# Patient Record
Sex: FEMALE | Race: OTHER | NOT HISPANIC OR LATINO
[De-identification: names, ages, dates, MRNs, and addresses within clinical notes are randomized per-mention and may not be internally consistent; named-entity substitution may affect disease eponyms.]

---

## 2017-09-05 ENCOUNTER — APPOINTMENT (OUTPATIENT)
Dept: GYNECOLOGIC ONCOLOGY | Facility: CLINIC | Age: 67
End: 2017-09-05
Payer: MEDICARE

## 2017-09-05 ENCOUNTER — RECORD ABSTRACTING (OUTPATIENT)
Age: 67
End: 2017-09-05

## 2017-09-05 VITALS
BODY MASS INDEX: 39.99 KG/M2 | HEART RATE: 88 BPM | SYSTOLIC BLOOD PRESSURE: 128 MMHG | HEIGHT: 65 IN | WEIGHT: 240 LBS | DIASTOLIC BLOOD PRESSURE: 74 MMHG

## 2017-09-05 DIAGNOSIS — Z98.890 OTHER SPECIFIED POSTPROCEDURAL STATES: ICD-10-CM

## 2017-09-05 DIAGNOSIS — Z86.69 PERSONAL HISTORY OF OTHER DISEASES OF THE NERVOUS SYSTEM AND SENSE ORGANS: ICD-10-CM

## 2017-09-05 DIAGNOSIS — Z96.641 PRESENCE OF RIGHT ARTIFICIAL HIP JOINT: ICD-10-CM

## 2017-09-05 PROCEDURE — 99205 OFFICE O/P NEW HI 60 MIN: CPT

## 2017-09-07 ENCOUNTER — OTHER (OUTPATIENT)
Age: 67
End: 2017-09-07

## 2017-09-08 ENCOUNTER — APPOINTMENT (OUTPATIENT)
Dept: MRI IMAGING | Facility: CLINIC | Age: 67
End: 2017-09-08

## 2017-09-10 ENCOUNTER — FORM ENCOUNTER (OUTPATIENT)
Age: 67
End: 2017-09-10

## 2017-09-11 ENCOUNTER — OUTPATIENT (OUTPATIENT)
Dept: OUTPATIENT SERVICES | Facility: HOSPITAL | Age: 67
LOS: 1 days | End: 2017-09-11

## 2017-09-11 ENCOUNTER — APPOINTMENT (OUTPATIENT)
Dept: MRI IMAGING | Facility: CLINIC | Age: 67
End: 2017-09-11
Payer: MEDICARE

## 2017-09-11 DIAGNOSIS — C54.1 MALIGNANT NEOPLASM OF ENDOMETRIUM: ICD-10-CM

## 2017-09-11 PROCEDURE — 72197 MRI PELVIS W/O & W/DYE: CPT | Mod: 26

## 2017-09-14 ENCOUNTER — RESULT REVIEW (OUTPATIENT)
Age: 67
End: 2017-09-14

## 2017-09-14 ENCOUNTER — OUTPATIENT (OUTPATIENT)
Dept: OUTPATIENT SERVICES | Facility: HOSPITAL | Age: 67
LOS: 1 days | End: 2017-09-14
Payer: COMMERCIAL

## 2017-09-14 DIAGNOSIS — C54.1 MALIGNANT NEOPLASM OF ENDOMETRIUM: ICD-10-CM

## 2017-09-14 LAB — CORE LAB BIOPSY: NORMAL

## 2017-09-14 PROCEDURE — 88321 CONSLTJ&REPRT SLD PREP ELSWR: CPT

## 2017-09-18 LAB — SURGICAL PATHOLOGY STUDY: SIGNIFICANT CHANGE UP

## 2017-09-27 ENCOUNTER — APPOINTMENT (OUTPATIENT)
Dept: GYNECOLOGIC ONCOLOGY | Facility: CLINIC | Age: 67
End: 2017-09-27
Payer: MEDICARE

## 2017-09-27 PROCEDURE — 99214 OFFICE O/P EST MOD 30 MIN: CPT

## 2017-10-04 ENCOUNTER — OUTPATIENT (OUTPATIENT)
Dept: OUTPATIENT SERVICES | Facility: HOSPITAL | Age: 67
LOS: 1 days | End: 2017-10-04
Payer: COMMERCIAL

## 2017-10-04 VITALS
RESPIRATION RATE: 16 BRPM | WEIGHT: 240.08 LBS | DIASTOLIC BLOOD PRESSURE: 64 MMHG | SYSTOLIC BLOOD PRESSURE: 132 MMHG | HEIGHT: 62 IN | HEART RATE: 87 BPM | TEMPERATURE: 97 F

## 2017-10-04 DIAGNOSIS — C54.1 MALIGNANT NEOPLASM OF ENDOMETRIUM: ICD-10-CM

## 2017-10-04 DIAGNOSIS — Z96.653 PRESENCE OF ARTIFICIAL KNEE JOINT, BILATERAL: Chronic | ICD-10-CM

## 2017-10-04 DIAGNOSIS — Z96.649 PRESENCE OF UNSPECIFIED ARTIFICIAL HIP JOINT: Chronic | ICD-10-CM

## 2017-10-04 DIAGNOSIS — R06.83 SNORING: ICD-10-CM

## 2017-10-04 DIAGNOSIS — Z98.42 CATARACT EXTRACTION STATUS, LEFT EYE: Chronic | ICD-10-CM

## 2017-10-04 DIAGNOSIS — R94.31 ABNORMAL ELECTROCARDIOGRAM [ECG] [EKG]: ICD-10-CM

## 2017-10-04 LAB
BLD GP AB SCN SERPL QL: NEGATIVE — SIGNIFICANT CHANGE UP
BUN SERPL-MCNC: 19 MG/DL — SIGNIFICANT CHANGE UP (ref 7–23)
CALCIUM SERPL-MCNC: 9.4 MG/DL — SIGNIFICANT CHANGE UP (ref 8.4–10.5)
CHLORIDE SERPL-SCNC: 102 MMOL/L — SIGNIFICANT CHANGE UP (ref 98–107)
CO2 SERPL-SCNC: 25 MMOL/L — SIGNIFICANT CHANGE UP (ref 22–31)
CREAT SERPL-MCNC: 0.92 MG/DL — SIGNIFICANT CHANGE UP (ref 0.5–1.3)
GLUCOSE SERPL-MCNC: 69 MG/DL — LOW (ref 70–99)
HCT VFR BLD CALC: 37.2 % — SIGNIFICANT CHANGE UP (ref 34.5–45)
HGB BLD-MCNC: 11.6 G/DL — SIGNIFICANT CHANGE UP (ref 11.5–15.5)
MCHC RBC-ENTMCNC: 26 PG — LOW (ref 27–34)
MCHC RBC-ENTMCNC: 31.2 % — LOW (ref 32–36)
MCV RBC AUTO: 83.2 FL — SIGNIFICANT CHANGE UP (ref 80–100)
NRBC # FLD: 0 — SIGNIFICANT CHANGE UP
PLATELET # BLD AUTO: 179 K/UL — SIGNIFICANT CHANGE UP (ref 150–400)
PMV BLD: 12 FL — SIGNIFICANT CHANGE UP (ref 7–13)
POTASSIUM SERPL-MCNC: 4.6 MMOL/L — SIGNIFICANT CHANGE UP (ref 3.5–5.3)
POTASSIUM SERPL-SCNC: 4.6 MMOL/L — SIGNIFICANT CHANGE UP (ref 3.5–5.3)
RBC # BLD: 4.47 M/UL — SIGNIFICANT CHANGE UP (ref 3.8–5.2)
RBC # FLD: 13.7 % — SIGNIFICANT CHANGE UP (ref 10.3–14.5)
RH IG SCN BLD-IMP: POSITIVE — SIGNIFICANT CHANGE UP
SODIUM SERPL-SCNC: 142 MMOL/L — SIGNIFICANT CHANGE UP (ref 135–145)
WBC # BLD: 5.99 K/UL — SIGNIFICANT CHANGE UP (ref 3.8–10.5)
WBC # FLD AUTO: 5.99 K/UL — SIGNIFICANT CHANGE UP (ref 3.8–10.5)

## 2017-10-04 PROCEDURE — 93010 ELECTROCARDIOGRAM REPORT: CPT

## 2017-10-04 RX ORDER — SODIUM CHLORIDE 9 MG/ML
1000 INJECTION, SOLUTION INTRAVENOUS
Qty: 0 | Refills: 0 | Status: DISCONTINUED | OUTPATIENT
Start: 2017-10-12 | End: 2017-10-13

## 2017-10-04 RX ORDER — SODIUM CHLORIDE 9 MG/ML
3 INJECTION INTRAMUSCULAR; INTRAVENOUS; SUBCUTANEOUS EVERY 8 HOURS
Qty: 0 | Refills: 0 | Status: DISCONTINUED | OUTPATIENT
Start: 2017-10-12 | End: 2017-10-13

## 2017-10-04 NOTE — H&P PST ADULT - PROBLEM SELECTOR PLAN 1
Scheduled for Robotic Total Laparoscopic Hysterectomy, Bilateral Salpingo Oophorectomy on 10/12/2017  Pre op instructions given, pt verbalized understanding  Chlorhexidine wash and GI prophylaxis provided

## 2017-10-04 NOTE — H&P PST ADULT - NEGATIVE NEUROLOGICAL SYMPTOMS
no paresthesias/no syncope/no loss of consciousness/no headache/no difficulty walking/no confusion/no weakness/no transient paralysis/no focal seizures/no facial palsy/no hemiparesis/no vertigo/no generalized seizures/no loss of sensation/no tremors

## 2017-10-04 NOTE — H&P PST ADULT - NEGATIVE CARDIOVASCULAR SYMPTOMS
no claudication/no paroxysmal nocturnal dyspnea/no palpitations/no chest pain/no orthopnea/no peripheral edema

## 2017-10-04 NOTE — H&P PST ADULT - PSH
H/O cataract extraction, left  2014  History of hip replacement  6/2013  History of total bilateral knee replacement  right in 2010, left in 2015

## 2017-10-04 NOTE — H&P PST ADULT - NSANTHOSAYNRD_GEN_A_CORE
No. CECILIA screening performed.  STOP BANG Legend: 0-2 = LOW Risk; 3-4 = INTERMEDIATE Risk; 5-8 = HIGH Risk

## 2017-10-04 NOTE — H&P PST ADULT - MALLAMPATI CLASS
Class I (easy) - visualization of the soft palate, fauces, uvula, and both anterior and posterior pillars Class IV (difficult) - the soft palate is not visible at all

## 2017-10-04 NOTE — H&P PST ADULT - HISTORY OF PRESENT ILLNESS
67 y/o female presents to [email or web address] for preoperative evaluation with diagnosis of malignant neoplasm of endometrium. h/o endometrial CA in 2015 and s/p radiation treatement and attempted hysterectomy. Pt's daughter states surgery was unsuccessful because mothers "uterus was attached to her colon". She followed by every 3 months but in January pt develop spotting and was later diagnosed with recurrence of endometrial CA. Scheduled for Robotic Total Laparoscopic Hysterectomy, Bilateral Salpingo Oophorectomy on 10/12/2017 67 y/o female presents to [facility] for preoperative evaluation with diagnosis of malignant neoplasm of endometrium. h/o endometrial CA in 2015 and s/p radiation treatement and attempted hysterectomy. Pt's daughter states surgery was unsuccessful because mothers "uterus was attached to her colon". She followed up every 3 months but in January of this year patient started spotting and was later diagnosed with recurrence of endometrial CA. Scheduled for Robotic Total Laparoscopic Hysterectomy, Bilateral Salpingo Oophorectomy on 10/12/2017

## 2017-10-04 NOTE — H&P PST ADULT - PROBLEM SELECTOR PLAN 2
Pt has appt for cardiac clearance on 10/6, copy of clearance requested   Comparison EKG, ECHO and Stress Test reports requested

## 2017-10-06 ENCOUNTER — TRANSCRIPTION ENCOUNTER (OUTPATIENT)
Age: 67
End: 2017-10-06

## 2017-10-09 ENCOUNTER — OTHER (OUTPATIENT)
Age: 67
End: 2017-10-09

## 2017-10-11 NOTE — ASU PATIENT PROFILE, ADULT - LANGUAGE ASSISTANCE NEEDED
No-Patient/Caregiver offered and refused free interpretation services. Pt Faroese speaking, as per translater Anir ID# [id number], okay for daughter to interpret/No-Patient/Caregiver offered and refused free interpretation services.

## 2017-10-12 ENCOUNTER — TRANSCRIPTION ENCOUNTER (OUTPATIENT)
Age: 67
End: 2017-10-12

## 2017-10-12 ENCOUNTER — INPATIENT (INPATIENT)
Facility: HOSPITAL | Age: 67
LOS: 0 days | Discharge: HOME | End: 2017-10-13
Attending: OBSTETRICS & GYNECOLOGY | Admitting: OBSTETRICS & GYNECOLOGY
Payer: MEDICARE

## 2017-10-12 ENCOUNTER — APPOINTMENT (OUTPATIENT)
Dept: GYNECOLOGIC ONCOLOGY | Facility: HOSPITAL | Age: 67
End: 2017-10-12

## 2017-10-12 ENCOUNTER — RESULT REVIEW (OUTPATIENT)
Age: 67
End: 2017-10-12

## 2017-10-12 VITALS
DIASTOLIC BLOOD PRESSURE: 84 MMHG | RESPIRATION RATE: 16 BRPM | SYSTOLIC BLOOD PRESSURE: 190 MMHG | HEART RATE: 84 BPM | WEIGHT: 240.08 LBS | OXYGEN SATURATION: 98 % | HEIGHT: 62 IN | TEMPERATURE: 98 F

## 2017-10-12 DIAGNOSIS — Z96.649 PRESENCE OF UNSPECIFIED ARTIFICIAL HIP JOINT: Chronic | ICD-10-CM

## 2017-10-12 DIAGNOSIS — C54.1 MALIGNANT NEOPLASM OF ENDOMETRIUM: ICD-10-CM

## 2017-10-12 DIAGNOSIS — Z98.42 CATARACT EXTRACTION STATUS, LEFT EYE: Chronic | ICD-10-CM

## 2017-10-12 DIAGNOSIS — Z96.653 PRESENCE OF ARTIFICIAL KNEE JOINT, BILATERAL: Chronic | ICD-10-CM

## 2017-10-12 LAB — RH IG SCN BLD-IMP: POSITIVE — SIGNIFICANT CHANGE UP

## 2017-10-12 PROCEDURE — 58573 TLH W/T/O UTERUS OVER 250 G: CPT

## 2017-10-12 PROCEDURE — S2900 ROBOTIC SURGICAL SYSTEM: CPT | Mod: NC

## 2017-10-12 PROCEDURE — 88309 TISSUE EXAM BY PATHOLOGIST: CPT | Mod: 26

## 2017-10-12 PROCEDURE — 88341 IMHCHEM/IMCYTCHM EA ADD ANTB: CPT | Mod: 26

## 2017-10-12 PROCEDURE — 88342 IMHCHEM/IMCYTCHM 1ST ANTB: CPT | Mod: 26

## 2017-10-12 PROCEDURE — 88112 CYTOPATH CELL ENHANCE TECH: CPT | Mod: 26

## 2017-10-12 RX ORDER — ONDANSETRON 8 MG/1
4 TABLET, FILM COATED ORAL ONCE
Qty: 0 | Refills: 0 | Status: DISCONTINUED | OUTPATIENT
Start: 2017-10-12 | End: 2017-10-13

## 2017-10-12 RX ORDER — IBUPROFEN 200 MG
600 TABLET ORAL EVERY 6 HOURS
Qty: 0 | Refills: 0 | Status: DISCONTINUED | OUTPATIENT
Start: 2017-10-12 | End: 2017-10-13

## 2017-10-12 RX ORDER — HYDROMORPHONE HYDROCHLORIDE 2 MG/ML
0.5 INJECTION INTRAMUSCULAR; INTRAVENOUS; SUBCUTANEOUS
Qty: 0 | Refills: 0 | Status: DISCONTINUED | OUTPATIENT
Start: 2017-10-12 | End: 2017-10-13

## 2017-10-12 RX ORDER — DOCUSATE SODIUM 100 MG
100 CAPSULE ORAL
Qty: 0 | Refills: 0 | Status: DISCONTINUED | OUTPATIENT
Start: 2017-10-12 | End: 2017-10-13

## 2017-10-12 RX ORDER — ENOXAPARIN SODIUM 100 MG/ML
40 INJECTION SUBCUTANEOUS DAILY
Qty: 0 | Refills: 0 | Status: DISCONTINUED | OUTPATIENT
Start: 2017-10-12 | End: 2017-10-13

## 2017-10-12 RX ORDER — ACETAMINOPHEN 500 MG
975 TABLET ORAL EVERY 6 HOURS
Qty: 0 | Refills: 0 | Status: DISCONTINUED | OUTPATIENT
Start: 2017-10-12 | End: 2017-10-13

## 2017-10-12 RX ORDER — OXYCODONE HYDROCHLORIDE 5 MG/1
5 TABLET ORAL EVERY 4 HOURS
Qty: 0 | Refills: 0 | Status: DISCONTINUED | OUTPATIENT
Start: 2017-10-12 | End: 2017-10-13

## 2017-10-12 RX ORDER — SODIUM CHLORIDE 9 MG/ML
1000 INJECTION, SOLUTION INTRAVENOUS
Qty: 0 | Refills: 0 | Status: DISCONTINUED | OUTPATIENT
Start: 2017-10-12 | End: 2017-10-13

## 2017-10-12 RX ORDER — SODIUM CHLORIDE 9 MG/ML
500 INJECTION, SOLUTION INTRAVENOUS ONCE
Qty: 0 | Refills: 0 | Status: COMPLETED | OUTPATIENT
Start: 2017-10-12 | End: 2017-10-12

## 2017-10-12 RX ORDER — SENNA PLUS 8.6 MG/1
2 TABLET ORAL AT BEDTIME
Qty: 0 | Refills: 0 | Status: DISCONTINUED | OUTPATIENT
Start: 2017-10-12 | End: 2017-10-13

## 2017-10-12 RX ORDER — HEPARIN SODIUM 5000 [USP'U]/ML
5000 INJECTION INTRAVENOUS; SUBCUTANEOUS ONCE
Qty: 0 | Refills: 0 | Status: COMPLETED | OUTPATIENT
Start: 2017-10-12 | End: 2017-10-12

## 2017-10-12 RX ADMIN — SODIUM CHLORIDE 3 MILLILITER(S): 9 INJECTION INTRAMUSCULAR; INTRAVENOUS; SUBCUTANEOUS at 22:00

## 2017-10-12 RX ADMIN — SODIUM CHLORIDE 3 MILLILITER(S): 9 INJECTION INTRAMUSCULAR; INTRAVENOUS; SUBCUTANEOUS at 18:25

## 2017-10-12 RX ADMIN — Medication 600 MILLIGRAM(S): at 21:05

## 2017-10-12 RX ADMIN — Medication 600 MILLIGRAM(S): at 21:40

## 2017-10-12 RX ADMIN — SODIUM CHLORIDE 1000 MILLILITER(S): 9 INJECTION, SOLUTION INTRAVENOUS at 19:30

## 2017-10-12 RX ADMIN — Medication 975 MILLIGRAM(S): at 21:44

## 2017-10-12 RX ADMIN — SENNA PLUS 2 TABLET(S): 8.6 TABLET ORAL at 21:44

## 2017-10-12 RX ADMIN — HEPARIN SODIUM 5000 UNIT(S): 5000 INJECTION INTRAVENOUS; SUBCUTANEOUS at 11:53

## 2017-10-12 RX ADMIN — SODIUM CHLORIDE 125 MILLILITER(S): 9 INJECTION, SOLUTION INTRAVENOUS at 18:15

## 2017-10-12 RX ADMIN — SODIUM CHLORIDE 30 MILLILITER(S): 9 INJECTION, SOLUTION INTRAVENOUS at 11:53

## 2017-10-12 NOTE — DISCHARGE NOTE ADULT - CARE PROVIDERS DIRECT ADDRESSES
[email or web address]

## 2017-10-12 NOTE — DISCHARGE NOTE ADULT - CARE PLAN
Principal Discharge DX:	Malignant neoplasm of endometrium  Goal:	return to baseline  Instructions for follow-up, activity and diet:	Please follow up in 2 weeks with Dr. London for your post-operative check. Please take Motrin, Tylenol for pain control. Please take Oxycodone for pain not relieved by Motrin, Tylenol. No heavy lifting, strenous exercise for 4-6 weeks. Nothing in the vagina - no sex, douching, tampons, tub baths - for 6 weeks.  Secondary Diagnosis:	Postoperative state

## 2017-10-12 NOTE — DISCHARGE NOTE ADULT - INSTRUCTIONS
none Call MD for fever greater than 101, nausea, vomiting, increased pain, pus drainage from incision, or go to ER.

## 2017-10-12 NOTE — DISCHARGE NOTE ADULT - CARE PROVIDER_API CALL
Pascale London), Gynecologic Oncology; Obstetrics and Gynecology  [address] [place] [address]  Phone: [phone]  Fax: [phone]

## 2017-10-12 NOTE — PROGRESS NOTE ADULT - ASSESSMENT
65 Y/O S/P Robotic, TLH, BSO  Plan  1. Patient encouraged to use Incentive Spirometer  2. Pain management; Tylenol, Motrin ATC, Oxycodone PRN  3. Clear liquids diet  4. Stokes to gravity  5. IV Fluids LR @125mL/hr  6. Lovenox 40 mg subcut daily  7. CBC, BMP, Mg & Phos in AM POD#1 65 Y/O S/P Robotic, TLH, BSO  Plan  1. Patient encouraged to use Incentive Spirometer  2. Pain management; Tylenol, Motrin ATC, Oxycodone PRN  3. Regular diet  4. Stokes to gravity  5. IV Fluids LR @125mL/hr  6. Lovenox 40 mg subcut daily  7. CBC, BMP, Mg & Phos in AM POD#1

## 2017-10-12 NOTE — DISCHARGE NOTE ADULT - NSTOBACCOHOTLINE_GEN_A_NCS
[facility] Smokers Quitline ([id number]) [facility] Smokers Quitline ([id number])

## 2017-10-12 NOTE — DISCHARGE NOTE ADULT - PATIENT PORTAL LINK FT
“You can access the Follow[employer] Patient Portal, offered by [facility], by registering with the following website: [email or web address]”

## 2017-10-12 NOTE — DISCHARGE NOTE ADULT - HOSPITAL COURSE
67 y/o s/p robotic-assisted total abdominal hysterectomy, bilateral salpingectomy, lysis of adhesions. Please see operative note for details. Pt had an uncomplicated post-operative course. POD#1, pt was able to tolerate regular diet, her wheat was discontinued and she was able to void spontaneously. Pt was discharged on POD#    with pain well controlled, after meeting all post-operative milestones.     Hct trended: 37.2->  WBC trended: 5.99-> 65 y/o s/p robotic-assisted total abdominal hysterectomy, bilateral salpingectomy, lysis of adhesions. Please see operative note for details. Pt had an uncomplicated post-operative course. POD#1, pt was able to tolerate regular diet, her wheat was discontinued and she was able to void spontaneously. Pt was discharged on POD#1 with pain well controlled, after meeting all post-operative milestones.     Hct trended: 37.2->30.3  WBC trended: 5.99->8.73

## 2017-10-12 NOTE — DISCHARGE NOTE ADULT - MEDICATION SUMMARY - MEDICATIONS TO TAKE
I will START or STAY ON the medications listed below when I get home from the hospital:    oxyCODONE 5 mg oral tablet  -- 1 tab(s) by mouth every 6 hours, As Needed - for severe pain MDD:4  -- Caution federal law prohibits the transfer of this drug to any person other  than the person for whom it was prescribed.  It is very important that you take or use this exactly as directed.  Do not skip doses or discontinue unless directed by your doctor.  May cause drowsiness.  Alcohol may intensify this effect.  Use care when operating dangerous machinery.  This prescription cannot be refilled.  Using more of this medication than prescribed may cause serious breathing problems.    -- Indication: For Pain    anastrozole 1 mg oral tablet  -- 1 tab(s) by mouth once a day pm  -- Indication: For home med

## 2017-10-12 NOTE — DISCHARGE NOTE ADULT - PLAN OF CARE
return to baseline Please follow up in 2 weeks with Dr. London for your post-operative check. Please take Motrin, Tylenol for pain control. Please take Oxycodone for pain not relieved by Motrin, Tylenol. No heavy lifting, strenous exercise for 4-6 weeks. Nothing in the vagina - no sex, douching, tampons, tub baths - for 6 weeks.

## 2017-10-12 NOTE — BRIEF OPERATIVE NOTE - PROCEDURE
<<-----Click on this checkbox to enter Procedure Robotic assisted procedure  10/12/2017  PREM-BSO, CM  Active  [email or web address]

## 2017-10-13 VITALS
SYSTOLIC BLOOD PRESSURE: 116 MMHG | HEART RATE: 62 BPM | OXYGEN SATURATION: 97 % | TEMPERATURE: 98 F | RESPIRATION RATE: 18 BRPM | DIASTOLIC BLOOD PRESSURE: 45 MMHG

## 2017-10-13 DIAGNOSIS — Z98.890 OTHER SPECIFIED POSTPROCEDURAL STATES: ICD-10-CM

## 2017-10-13 LAB
BASOPHILS # BLD AUTO: 0.01 K/UL — SIGNIFICANT CHANGE UP (ref 0–0.2)
BASOPHILS NFR BLD AUTO: 0.1 % — SIGNIFICANT CHANGE UP (ref 0–2)
BUN SERPL-MCNC: 14 MG/DL — SIGNIFICANT CHANGE UP (ref 7–23)
CALCIUM SERPL-MCNC: 8.8 MG/DL — SIGNIFICANT CHANGE UP (ref 8.4–10.5)
CHLORIDE SERPL-SCNC: 102 MMOL/L — SIGNIFICANT CHANGE UP (ref 98–107)
CO2 SERPL-SCNC: 22 MMOL/L — SIGNIFICANT CHANGE UP (ref 22–31)
CREAT SERPL-MCNC: 0.82 MG/DL — SIGNIFICANT CHANGE UP (ref 0.5–1.3)
EOSINOPHIL # BLD AUTO: 0 K/UL — SIGNIFICANT CHANGE UP (ref 0–0.5)
EOSINOPHIL NFR BLD AUTO: 0 % — SIGNIFICANT CHANGE UP (ref 0–6)
GLUCOSE SERPL-MCNC: 128 MG/DL — HIGH (ref 70–99)
HCT VFR BLD CALC: 30.3 % — LOW (ref 34.5–45)
HGB BLD-MCNC: 9.7 G/DL — LOW (ref 11.5–15.5)
IMM GRANULOCYTES # BLD AUTO: 0.05 # — SIGNIFICANT CHANGE UP
IMM GRANULOCYTES NFR BLD AUTO: 0.6 % — SIGNIFICANT CHANGE UP (ref 0–1.5)
LYMPHOCYTES # BLD AUTO: 0.83 K/UL — LOW (ref 1–3.3)
LYMPHOCYTES # BLD AUTO: 9.5 % — LOW (ref 13–44)
MAGNESIUM SERPL-MCNC: 1.8 MG/DL — SIGNIFICANT CHANGE UP (ref 1.6–2.6)
MCHC RBC-ENTMCNC: 26 PG — LOW (ref 27–34)
MCHC RBC-ENTMCNC: 32 % — SIGNIFICANT CHANGE UP (ref 32–36)
MCV RBC AUTO: 81.2 FL — SIGNIFICANT CHANGE UP (ref 80–100)
MONOCYTES # BLD AUTO: 0.49 K/UL — SIGNIFICANT CHANGE UP (ref 0–0.9)
MONOCYTES NFR BLD AUTO: 5.6 % — SIGNIFICANT CHANGE UP (ref 2–14)
NEUTROPHILS # BLD AUTO: 7.35 K/UL — SIGNIFICANT CHANGE UP (ref 1.8–7.4)
NEUTROPHILS NFR BLD AUTO: 84.2 % — HIGH (ref 43–77)
NON-GYNECOLOGICAL CYTOLOGY STUDY: SIGNIFICANT CHANGE UP
NRBC # FLD: 0 — SIGNIFICANT CHANGE UP
PHOSPHATE SERPL-MCNC: 4 MG/DL — SIGNIFICANT CHANGE UP (ref 2.5–4.5)
PLATELET # BLD AUTO: 156 K/UL — SIGNIFICANT CHANGE UP (ref 150–400)
PMV BLD: 11.8 FL — SIGNIFICANT CHANGE UP (ref 7–13)
POTASSIUM SERPL-MCNC: 4.6 MMOL/L — SIGNIFICANT CHANGE UP (ref 3.5–5.3)
POTASSIUM SERPL-SCNC: 4.6 MMOL/L — SIGNIFICANT CHANGE UP (ref 3.5–5.3)
RBC # BLD: 3.73 M/UL — LOW (ref 3.8–5.2)
RBC # FLD: 13.7 % — SIGNIFICANT CHANGE UP (ref 10.3–14.5)
SODIUM SERPL-SCNC: 139 MMOL/L — SIGNIFICANT CHANGE UP (ref 135–145)
WBC # BLD: 8.73 K/UL — SIGNIFICANT CHANGE UP (ref 3.8–10.5)
WBC # FLD AUTO: 8.73 K/UL — SIGNIFICANT CHANGE UP (ref 3.8–10.5)

## 2017-10-13 RX ORDER — OXYCODONE HYDROCHLORIDE 5 MG/1
1 TABLET ORAL
Qty: 20 | Refills: 0
Start: 2017-10-13

## 2017-10-13 RX ORDER — OXYCODONE HYDROCHLORIDE 5 MG/1
1 TABLET ORAL
Qty: 20 | Refills: 0 | OUTPATIENT
Start: 2017-10-13

## 2017-10-13 RX ORDER — SODIUM CHLORIDE 9 MG/ML
3 INJECTION INTRAMUSCULAR; INTRAVENOUS; SUBCUTANEOUS EVERY 8 HOURS
Qty: 0 | Refills: 0 | Status: DISCONTINUED | OUTPATIENT
Start: 2017-10-13 | End: 2017-10-13

## 2017-10-13 RX ADMIN — Medication 975 MILLIGRAM(S): at 05:19

## 2017-10-13 RX ADMIN — Medication 600 MILLIGRAM(S): at 02:54

## 2017-10-13 RX ADMIN — OXYCODONE HYDROCHLORIDE 5 MILLIGRAM(S): 5 TABLET ORAL at 14:06

## 2017-10-13 RX ADMIN — SODIUM CHLORIDE 3 MILLILITER(S): 9 INJECTION INTRAMUSCULAR; INTRAVENOUS; SUBCUTANEOUS at 05:19

## 2017-10-13 RX ADMIN — OXYCODONE HYDROCHLORIDE 5 MILLIGRAM(S): 5 TABLET ORAL at 13:36

## 2017-10-13 RX ADMIN — SODIUM CHLORIDE 125 MILLILITER(S): 9 INJECTION, SOLUTION INTRAVENOUS at 05:59

## 2017-10-13 RX ADMIN — ENOXAPARIN SODIUM 40 MILLIGRAM(S): 100 INJECTION SUBCUTANEOUS at 11:23

## 2017-10-13 RX ADMIN — Medication 975 MILLIGRAM(S): at 11:23

## 2017-10-13 RX ADMIN — Medication 100 MILLIGRAM(S): at 05:19

## 2017-10-13 RX ADMIN — Medication 600 MILLIGRAM(S): at 09:21

## 2017-10-13 RX ADMIN — Medication 600 MILLIGRAM(S): at 03:45

## 2017-10-13 NOTE — PROGRESS NOTE ADULT - ASSESSMENT
Assessment/Plan: 66y female POD#1, s/p RA TLH-BSO, CM, Omental J-Flap for endometrial CA s/p EBRT (2015). Pt is currently stable.

## 2017-10-13 NOTE — PROGRESS NOTE ADULT - PROBLEM SELECTOR PLAN 1
CV: Hemodynamically stable, continue to monitor VS, trend H/H.  Pulm: Saturating well on RA. Increase incentive spirometry. Encourage ambulation.  GI: Tolerating fluids overnight, advance diet as tolerated.   : Adequate UOP overnight - clear/yellow urine, wheat d/c'd this AM, DTV@3p; trend creatinine.  Heme: Continue Lovenox/Venodynes for DVT ppx. Increase OOB.    Neuro: Continue Tylenol, Motrin, Oxycodone for pain control.    Michael, [email or web address]

## 2017-10-13 NOTE — PROGRESS NOTE ADULT - SUBJECTIVE AND OBJECTIVE BOX
PA GYN/ONC POST OP NOTE    Pt awake & resting comfortably, medicated with dose of Motrin. Pt denies having any nausea, tolerating ice chips & sips of water.    Vital Signs Last 24 Hrs  T(C): 36.3 (12 Oct 2017 20:00), Max: 37 (12 Oct 2017 19:00)  T(F): 97.4 (12 Oct 2017 20:00), Max: 98.6 (12 Oct 2017 19:00)  HR: 79 (12 Oct 2017 21:00) (62 - 97)  BP: 108/69 (12 Oct 2017 21:00) (95/41 - 190/84)  BP(mean): --  RR: 18 (12 Oct 2017 21:00) (13 - 22)  SpO2: 96% (12 Oct 2017 21:00) (95% - 100%)    U/O:    I&O's Detail    12 Oct 2017 07:01  -  12 Oct 2017 21:30  --------------------------------------------------------  IN:    lactated ringers.: 500 mL    Oral Fluid: 120 mL  Total IN: 620 mL    OUT:    Indwelling Catheter - Urethral: 235 mL  Total OUT: 235 mL    Total NET: 385 mL          PHYSICAL EXAM:  CHEST/LUNG: CTA B/L  HEART: S1S2 RRR  ABDOMEN: Soft, appropriate tenderness  INCISION: Scope sites C/D/I  EXTREMITIES: NT B/L, PAS in place        MEDICATIONS  (STANDING):  acetaminophen   Tablet 975 milliGRAM(s) Oral every 6 hours  docusate sodium 100 milliGRAM(s) Oral two times a day  enoxaparin Injectable 40 milliGRAM(s) SubCutaneous daily  ibuprofen  Tablet 600 milliGRAM(s) Oral every 6 hours  lactated ringers. 1000 milliLiter(s) (125 mL/Hr) IV Continuous <Continuous>  lactated ringers. 1000 milliLiter(s) (30 mL/Hr) IV Continuous <Continuous>  senna 2 Tablet(s) Oral at bedtime  sodium chloride 0.9% lock flush 3 milliLiter(s) IV Push every 8 hours    MEDICATIONS  (PRN):  HYDROmorphone  Injectable 0.5 milliGRAM(s) IV Push every 10 minutes PRN Severe Pain (7 - 10)  ondansetron Injectable 4 milliGRAM(s) IV Push once PRN Nausea and/or Vomiting  oxyCODONE    IR 5 milliGRAM(s) Oral every 4 hours PRN Severe Pain (7 - 10)
Gyn ONC Progress Note POD #1    Subjective:   Pt seen and examined at bedside. No events overnight. Pain well controlled. Patient has not ambulated. No flatus. Tolerating water overnight. Pt denies fever, chills, chest pain, SOB, nausea, vomiting, lightheadedness, dizziness.      Objective:  T(F): 97.8 (10-13-17 @ 05:17), Max: 98.6 (10-12-17 @ 19:00)  HR: 97 (10-13-17 @ 05:17) (62 - 99)  BP: 132/59 (10-13-17 @ 05:17) (95/41 - 190/84)  RR: 18 (10-13-17 @ 05:17) (13 - 22)  SpO2: 98% (10-13-17 @ 05:17) (95% - 100%)  Wt(kg): --  I&O's Summary    12 Oct 2017 07:01  -  13 Oct 2017 07:00  --------------------------------------------------------  IN: 2595 mL / OUT: 1020 mL / NET: 1575 mL      CAPILLARY BLOOD GLUCOSE  90 (12 Oct 2017 11:31)      MEDICATIONS  (STANDING):  acetaminophen   Tablet 975 milliGRAM(s) Oral every 6 hours  docusate sodium 100 milliGRAM(s) Oral two times a day  enoxaparin Injectable 40 milliGRAM(s) SubCutaneous daily  ibuprofen  Tablet 600 milliGRAM(s) Oral every 6 hours  lactated ringers. 1000 milliLiter(s) (125 mL/Hr) IV Continuous <Continuous>  lactated ringers. 1000 milliLiter(s) (30 mL/Hr) IV Continuous <Continuous>  senna 2 Tablet(s) Oral at bedtime  sodium chloride 0.9% lock flush 3 milliLiter(s) IV Push every 8 hours    MEDICATIONS  (PRN):  HYDROmorphone  Injectable 0.5 milliGRAM(s) IV Push every 10 minutes PRN Severe Pain (7 - 10)  ondansetron Injectable 4 milliGRAM(s) IV Push once PRN Nausea and/or Vomiting  oxyCODONE    IR 5 milliGRAM(s) Oral every 4 hours PRN Severe Pain (7 - 10)      Physical Exam:  Constitutional: NAD, A+O x3  CV: RRR  Lungs: clear to auscultation bilaterally  Abdomen: soft, nondistended, no guarding, no rebound, normal bowel sounds  Incision: 4 port-site incisions - dressings clean/dry/intact  Extremities: no lower extremity edema or calf tenderness bilaterally; venodynes in place
no apparent anesthesia related complications

## 2017-10-13 NOTE — PROGRESS NOTE ADULT - ATTENDING COMMENTS
Patient seen and examined at bedside at 8 am with team, agree with above gyn resident note, including assessment and plan. Abdomen benign. Discussed postop plan and instructions, all questions answered. Continue routine postop care, anticipate d/c home later today. F/u 2 weeks in office.

## 2017-10-18 ENCOUNTER — APPOINTMENT (OUTPATIENT)
Dept: GYNECOLOGIC ONCOLOGY | Facility: CLINIC | Age: 67
End: 2017-10-18
Payer: COMMERCIAL

## 2017-10-18 VITALS
DIASTOLIC BLOOD PRESSURE: 80 MMHG | TEMPERATURE: 98.9 F | SYSTOLIC BLOOD PRESSURE: 130 MMHG | HEIGHT: 65 IN | BODY MASS INDEX: 39.99 KG/M2 | WEIGHT: 240 LBS

## 2017-10-18 PROCEDURE — 99024 POSTOP FOLLOW-UP VISIT: CPT

## 2017-10-19 LAB
ANION GAP SERPL CALC-SCNC: 15 MMOL/L
BASOPHILS # BLD AUTO: 0.02 K/UL
BASOPHILS NFR BLD AUTO: 0.2 %
BUN SERPL-MCNC: 15 MG/DL
CALCIUM SERPL-MCNC: 9.6 MG/DL
CHLORIDE SERPL-SCNC: 102 MMOL/L
CO2 SERPL-SCNC: 20 MMOL/L
CREAT SERPL-MCNC: 0.92 MG/DL
EOSINOPHIL # BLD AUTO: 0.22 K/UL
EOSINOPHIL NFR BLD AUTO: 1.9 %
GLUCOSE SERPL-MCNC: 95 MG/DL
HCT VFR BLD CALC: 31.2 %
HGB BLD-MCNC: 10.4 G/DL
IMM GRANULOCYTES NFR BLD AUTO: 0.3 %
LYMPHOCYTES # BLD AUTO: 1.4 K/UL
LYMPHOCYTES NFR BLD AUTO: 12 %
MAN DIFF?: NORMAL
MCHC RBC-ENTMCNC: 26.7 PG
MCHC RBC-ENTMCNC: 33.3 GM/DL
MCV RBC AUTO: 80.2 FL
MONOCYTES # BLD AUTO: 1.19 K/UL
MONOCYTES NFR BLD AUTO: 10.2 %
NEUTROPHILS # BLD AUTO: 8.82 K/UL
NEUTROPHILS NFR BLD AUTO: 75.4 %
PLATELET # BLD AUTO: 231 K/UL
POTASSIUM SERPL-SCNC: 4.1 MMOL/L
RBC # BLD: 3.89 M/UL
RBC # FLD: 14.5 %
SODIUM SERPL-SCNC: 137 MMOL/L
WBC # FLD AUTO: 11.68 K/UL

## 2017-10-25 ENCOUNTER — LABORATORY RESULT (OUTPATIENT)
Age: 67
End: 2017-10-25

## 2017-10-25 ENCOUNTER — APPOINTMENT (OUTPATIENT)
Dept: GYNECOLOGIC ONCOLOGY | Facility: CLINIC | Age: 67
End: 2017-10-25
Payer: COMMERCIAL

## 2017-10-25 VITALS — TEMPERATURE: 101 F

## 2017-10-25 PROCEDURE — 99024 POSTOP FOLLOW-UP VISIT: CPT

## 2017-10-26 ENCOUNTER — OTHER (OUTPATIENT)
Age: 67
End: 2017-10-26

## 2017-10-26 ENCOUNTER — FORM ENCOUNTER (OUTPATIENT)
Age: 67
End: 2017-10-26

## 2017-10-27 ENCOUNTER — APPOINTMENT (OUTPATIENT)
Dept: CT IMAGING | Facility: CLINIC | Age: 67
End: 2017-10-27
Payer: MEDICARE

## 2017-10-27 ENCOUNTER — OUTPATIENT (OUTPATIENT)
Dept: OUTPATIENT SERVICES | Facility: HOSPITAL | Age: 67
LOS: 1 days | End: 2017-10-27
Payer: MEDICARE

## 2017-10-27 DIAGNOSIS — Z96.649 PRESENCE OF UNSPECIFIED ARTIFICIAL HIP JOINT: Chronic | ICD-10-CM

## 2017-10-27 DIAGNOSIS — Z96.653 PRESENCE OF ARTIFICIAL KNEE JOINT, BILATERAL: Chronic | ICD-10-CM

## 2017-10-27 DIAGNOSIS — Z00.8 ENCOUNTER FOR OTHER GENERAL EXAMINATION: ICD-10-CM

## 2017-10-27 DIAGNOSIS — N76.0 ACUTE VAGINITIS: ICD-10-CM

## 2017-10-27 DIAGNOSIS — C54.1 MALIGNANT NEOPLASM OF ENDOMETRIUM: ICD-10-CM

## 2017-10-27 DIAGNOSIS — Z98.42 CATARACT EXTRACTION STATUS, LEFT EYE: Chronic | ICD-10-CM

## 2017-10-27 LAB — SURGICAL PATHOLOGY STUDY: SIGNIFICANT CHANGE UP

## 2017-10-27 PROCEDURE — 74177 CT ABD & PELVIS W/CONTRAST: CPT

## 2017-10-27 PROCEDURE — 74177 CT ABD & PELVIS W/CONTRAST: CPT | Mod: 26

## 2017-10-30 LAB
ANION GAP SERPL CALC-SCNC: 16 MMOL/L
BASOPHILS # BLD AUTO: 0.11 K/UL
BASOPHILS NFR BLD AUTO: 0.9 %
BUN SERPL-MCNC: 13 MG/DL
CALCIUM SERPL-MCNC: 9.7 MG/DL
CHLORIDE SERPL-SCNC: 98 MMOL/L
CO2 SERPL-SCNC: 22 MMOL/L
CREAT SERPL-MCNC: 0.83 MG/DL
EOSINOPHIL # BLD AUTO: 0.21 K/UL
EOSINOPHIL NFR BLD AUTO: 1.8 %
GLUCOSE SERPL-MCNC: 120 MG/DL
HCT VFR BLD CALC: 29.9 %
HGB BLD-MCNC: 9.6 G/DL
LYMPHOCYTES # BLD AUTO: 1.07 K/UL
LYMPHOCYTES NFR BLD AUTO: 9.1 %
MAN DIFF?: NORMAL
MCHC RBC-ENTMCNC: 25.8 PG
MCHC RBC-ENTMCNC: 32.1 GM/DL
MCV RBC AUTO: 80.4 FL
MONOCYTES # BLD AUTO: 0.97 K/UL
MONOCYTES NFR BLD AUTO: 8.2 %
NEUTROPHILS # BLD AUTO: 9.33 K/UL
NEUTROPHILS NFR BLD AUTO: 79.1 %
PLATELET # BLD AUTO: 435 K/UL
POTASSIUM SERPL-SCNC: 4.6 MMOL/L
RBC # BLD: 3.72 M/UL
RBC # FLD: 14.3 %
SODIUM SERPL-SCNC: 136 MMOL/L
WBC # FLD AUTO: 11.79 K/UL

## 2018-01-02 ENCOUNTER — APPOINTMENT (OUTPATIENT)
Dept: GYNECOLOGIC ONCOLOGY | Facility: CLINIC | Age: 68
End: 2018-01-02
Payer: MEDICARE

## 2018-01-02 VITALS
SYSTOLIC BLOOD PRESSURE: 140 MMHG | BODY MASS INDEX: 40.98 KG/M2 | DIASTOLIC BLOOD PRESSURE: 100 MMHG | HEIGHT: 65 IN | WEIGHT: 246 LBS

## 2018-01-02 PROCEDURE — 57100 BIOPSY VAGINAL MUCOSA SIMPLE: CPT | Mod: 58

## 2018-01-02 PROCEDURE — 99214 OFFICE O/P EST MOD 30 MIN: CPT | Mod: 25

## 2018-01-02 RX ORDER — PNV NO.95/FERROUS FUM/FOLIC AC 28MG-0.8MG
TABLET ORAL
Refills: 0 | Status: DISCONTINUED | COMMUNITY
End: 2018-01-02

## 2018-01-02 RX ORDER — ANASTROZOLE TABLETS 1 MG/1
1 TABLET ORAL
Qty: 30 | Refills: 0 | Status: DISCONTINUED | COMMUNITY
Start: 2017-07-17 | End: 2018-01-02

## 2018-01-02 RX ORDER — OXYCODONE 5 MG/1
5 TABLET ORAL
Qty: 20 | Refills: 0 | Status: DISCONTINUED | COMMUNITY
Start: 2017-10-13 | End: 2018-01-02

## 2018-01-02 RX ORDER — AMOXICILLIN AND CLAVULANATE POTASSIUM 875; 125 MG/1; MG/1
875-125 TABLET, COATED ORAL
Qty: 14 | Refills: 0 | Status: DISCONTINUED | COMMUNITY
Start: 2017-10-25 | End: 2018-01-02

## 2018-01-11 LAB — CORE LAB BIOPSY: NORMAL

## 2018-01-23 ENCOUNTER — OUTPATIENT (OUTPATIENT)
Dept: OUTPATIENT SERVICES | Facility: HOSPITAL | Age: 68
LOS: 1 days | Discharge: HOME | End: 2018-01-23

## 2018-01-23 DIAGNOSIS — Z96.653 PRESENCE OF ARTIFICIAL KNEE JOINT, BILATERAL: Chronic | ICD-10-CM

## 2018-01-23 DIAGNOSIS — Z98.42 CATARACT EXTRACTION STATUS, LEFT EYE: Chronic | ICD-10-CM

## 2018-01-23 DIAGNOSIS — Z96.649 PRESENCE OF UNSPECIFIED ARTIFICIAL HIP JOINT: Chronic | ICD-10-CM

## 2018-01-23 DIAGNOSIS — C54.1 MALIGNANT NEOPLASM OF ENDOMETRIUM: ICD-10-CM

## 2018-01-26 ENCOUNTER — RESULT REVIEW (OUTPATIENT)
Age: 68
End: 2018-01-26

## 2018-01-26 ENCOUNTER — APPOINTMENT (OUTPATIENT)
Dept: HEMATOLOGY ONCOLOGY | Facility: CLINIC | Age: 68
End: 2018-01-26
Payer: MEDICARE

## 2018-01-26 VITALS
RESPIRATION RATE: 12 BRPM | TEMPERATURE: 98.3 F | BODY MASS INDEX: 41.21 KG/M2 | SYSTOLIC BLOOD PRESSURE: 148 MMHG | WEIGHT: 232.59 LBS | HEART RATE: 90 BPM | DIASTOLIC BLOOD PRESSURE: 93 MMHG | OXYGEN SATURATION: 99 % | HEIGHT: 62.99 IN

## 2018-01-26 LAB
BASOPHILS # BLD AUTO: 0.1 K/UL — SIGNIFICANT CHANGE UP (ref 0–0.2)
BASOPHILS NFR BLD AUTO: 1.1 % — SIGNIFICANT CHANGE UP (ref 0–2)
EOSINOPHIL # BLD AUTO: 0.2 K/UL — SIGNIFICANT CHANGE UP (ref 0–0.5)
EOSINOPHIL NFR BLD AUTO: 2.6 % — SIGNIFICANT CHANGE UP (ref 0–6)
HCT VFR BLD CALC: 37.6 % — SIGNIFICANT CHANGE UP (ref 34.5–45)
HGB BLD-MCNC: 11.6 G/DL — SIGNIFICANT CHANGE UP (ref 11.5–15.5)
LYMPHOCYTES # BLD AUTO: 2.3 K/UL — SIGNIFICANT CHANGE UP (ref 1–3.3)
LYMPHOCYTES # BLD AUTO: 35.4 % — SIGNIFICANT CHANGE UP (ref 13–44)
MCHC RBC-ENTMCNC: 25.4 PG — LOW (ref 27–34)
MCHC RBC-ENTMCNC: 31 GM/DL — LOW (ref 32–36)
MCV RBC AUTO: 82 FL — SIGNIFICANT CHANGE UP (ref 80–100)
MONOCYTES # BLD AUTO: 0.5 K/UL — SIGNIFICANT CHANGE UP (ref 0–0.9)
MONOCYTES NFR BLD AUTO: 8.6 % — SIGNIFICANT CHANGE UP (ref 2–14)
NEUTROPHILS # BLD AUTO: 3.3 K/UL — SIGNIFICANT CHANGE UP (ref 1.8–7.4)
NEUTROPHILS NFR BLD AUTO: 52.4 % — SIGNIFICANT CHANGE UP (ref 43–77)
PLATELET # BLD AUTO: 187 K/UL — SIGNIFICANT CHANGE UP (ref 150–400)
RBC # BLD: 4.58 M/UL — SIGNIFICANT CHANGE UP (ref 3.8–5.2)
RBC # FLD: 13.4 % — SIGNIFICANT CHANGE UP (ref 10.3–14.5)
WBC # BLD: 6.4 K/UL — SIGNIFICANT CHANGE UP (ref 3.8–10.5)
WBC # FLD AUTO: 6.4 K/UL — SIGNIFICANT CHANGE UP (ref 3.8–10.5)

## 2018-01-26 PROCEDURE — 99205 OFFICE O/P NEW HI 60 MIN: CPT

## 2018-01-26 RX ORDER — LEVOFLOXACIN 500 MG/1
500 TABLET, FILM COATED ORAL
Qty: 10 | Refills: 0 | Status: DISCONTINUED | COMMUNITY
Start: 2018-01-17

## 2018-01-26 RX ORDER — CIPROFLOXACIN HYDROCHLORIDE 500 MG/1
500 TABLET, FILM COATED ORAL
Qty: 20 | Refills: 0 | Status: DISCONTINUED | COMMUNITY
Start: 2017-12-28

## 2018-01-28 ENCOUNTER — FORM ENCOUNTER (OUTPATIENT)
Age: 68
End: 2018-01-28

## 2018-01-29 ENCOUNTER — OUTPATIENT (OUTPATIENT)
Dept: OUTPATIENT SERVICES | Facility: HOSPITAL | Age: 68
LOS: 1 days | End: 2018-01-29
Payer: MEDICARE

## 2018-01-29 ENCOUNTER — APPOINTMENT (OUTPATIENT)
Dept: CT IMAGING | Facility: CLINIC | Age: 68
End: 2018-01-29
Payer: MEDICARE

## 2018-01-29 DIAGNOSIS — Z96.649 PRESENCE OF UNSPECIFIED ARTIFICIAL HIP JOINT: Chronic | ICD-10-CM

## 2018-01-29 DIAGNOSIS — Z98.42 CATARACT EXTRACTION STATUS, LEFT EYE: Chronic | ICD-10-CM

## 2018-01-29 DIAGNOSIS — C54.1 MALIGNANT NEOPLASM OF ENDOMETRIUM: ICD-10-CM

## 2018-01-29 DIAGNOSIS — Z96.653 PRESENCE OF ARTIFICIAL KNEE JOINT, BILATERAL: Chronic | ICD-10-CM

## 2018-01-29 LAB
ALBUMIN SERPL ELPH-MCNC: 4.1 G/DL
ALP BLD-CCNC: 65 U/L
ALT SERPL-CCNC: 9 U/L
ANION GAP SERPL CALC-SCNC: 15 MMOL/L
AST SERPL-CCNC: 16 U/L
BILIRUB SERPL-MCNC: 0.4 MG/DL
BUN SERPL-MCNC: 16 MG/DL
CALCIUM SERPL-MCNC: 9 MG/DL
CHLORIDE SERPL-SCNC: 104 MMOL/L
CO2 SERPL-SCNC: 23 MMOL/L
CREAT SERPL-MCNC: 0.89 MG/DL
POTASSIUM SERPL-SCNC: 4.6 MMOL/L
PROT SERPL-MCNC: 7.3 G/DL
SODIUM SERPL-SCNC: 142 MMOL/L

## 2018-01-29 PROCEDURE — 71260 CT THORAX DX C+: CPT | Mod: 26

## 2018-01-29 PROCEDURE — 74177 CT ABD & PELVIS W/CONTRAST: CPT | Mod: 26

## 2018-01-29 PROCEDURE — 71260 CT THORAX DX C+: CPT

## 2018-01-29 PROCEDURE — 74177 CT ABD & PELVIS W/CONTRAST: CPT

## 2018-02-09 ENCOUNTER — APPOINTMENT (OUTPATIENT)
Dept: HEMATOLOGY ONCOLOGY | Facility: CLINIC | Age: 68
End: 2018-02-09
Payer: MEDICARE

## 2018-02-09 VITALS
HEART RATE: 94 BPM | TEMPERATURE: 98.2 F | OXYGEN SATURATION: 99 % | BODY MASS INDEX: 43.01 KG/M2 | SYSTOLIC BLOOD PRESSURE: 163 MMHG | WEIGHT: 242.71 LBS | DIASTOLIC BLOOD PRESSURE: 97 MMHG

## 2018-02-09 PROCEDURE — 99214 OFFICE O/P EST MOD 30 MIN: CPT

## 2018-02-15 ENCOUNTER — OUTPATIENT (OUTPATIENT)
Dept: OUTPATIENT SERVICES | Facility: HOSPITAL | Age: 68
LOS: 1 days | Discharge: HOME | End: 2018-02-15

## 2018-02-15 ENCOUNTER — APPOINTMENT (OUTPATIENT)
Dept: RADIATION ONCOLOGY | Facility: CLINIC | Age: 68
End: 2018-02-15
Payer: MEDICARE

## 2018-02-15 VITALS
WEIGHT: 293 LBS | DIASTOLIC BLOOD PRESSURE: 70 MMHG | RESPIRATION RATE: 16 BRPM | TEMPERATURE: 98 F | BODY MASS INDEX: 51.91 KG/M2 | HEART RATE: 106 BPM | SYSTOLIC BLOOD PRESSURE: 165 MMHG | HEIGHT: 62.99 IN | OXYGEN SATURATION: 100 %

## 2018-02-15 DIAGNOSIS — Z96.649 PRESENCE OF UNSPECIFIED ARTIFICIAL HIP JOINT: Chronic | ICD-10-CM

## 2018-02-15 DIAGNOSIS — Z96.653 PRESENCE OF ARTIFICIAL KNEE JOINT, BILATERAL: Chronic | ICD-10-CM

## 2018-02-15 DIAGNOSIS — Z98.42 CATARACT EXTRACTION STATUS, LEFT EYE: Chronic | ICD-10-CM

## 2018-02-15 PROCEDURE — 99205 OFFICE O/P NEW HI 60 MIN: CPT | Mod: 25

## 2018-04-10 ENCOUNTER — OUTPATIENT (OUTPATIENT)
Dept: OUTPATIENT SERVICES | Facility: HOSPITAL | Age: 68
LOS: 1 days | Discharge: HOME | End: 2018-04-10

## 2018-04-10 DIAGNOSIS — C54.1 MALIGNANT NEOPLASM OF ENDOMETRIUM: ICD-10-CM

## 2018-04-10 DIAGNOSIS — Z96.649 PRESENCE OF UNSPECIFIED ARTIFICIAL HIP JOINT: Chronic | ICD-10-CM

## 2018-04-10 DIAGNOSIS — Z96.653 PRESENCE OF ARTIFICIAL KNEE JOINT, BILATERAL: Chronic | ICD-10-CM

## 2018-04-10 DIAGNOSIS — Z98.42 CATARACT EXTRACTION STATUS, LEFT EYE: Chronic | ICD-10-CM

## 2018-04-13 ENCOUNTER — RESULT REVIEW (OUTPATIENT)
Age: 68
End: 2018-04-13

## 2018-04-13 ENCOUNTER — APPOINTMENT (OUTPATIENT)
Dept: HEMATOLOGY ONCOLOGY | Facility: CLINIC | Age: 68
End: 2018-04-13
Payer: MEDICARE

## 2018-04-13 VITALS
DIASTOLIC BLOOD PRESSURE: 84 MMHG | BODY MASS INDEX: 43.46 KG/M2 | WEIGHT: 245.26 LBS | HEART RATE: 104 BPM | TEMPERATURE: 99.1 F | SYSTOLIC BLOOD PRESSURE: 126 MMHG | OXYGEN SATURATION: 99 %

## 2018-04-13 LAB
BASOPHILS # BLD AUTO: 0.1 K/UL — SIGNIFICANT CHANGE UP (ref 0–0.2)
BASOPHILS NFR BLD AUTO: 1.1 % — SIGNIFICANT CHANGE UP (ref 0–2)
EOSINOPHIL # BLD AUTO: 0.3 K/UL — SIGNIFICANT CHANGE UP (ref 0–0.5)
EOSINOPHIL NFR BLD AUTO: 3 % — SIGNIFICANT CHANGE UP (ref 0–6)
HCT VFR BLD CALC: 35 % — SIGNIFICANT CHANGE UP (ref 34.5–45)
HGB BLD-MCNC: 11.2 G/DL — LOW (ref 11.5–15.5)
LYMPHOCYTES # BLD AUTO: 2.8 K/UL — SIGNIFICANT CHANGE UP (ref 1–3.3)
LYMPHOCYTES # BLD AUTO: 33.4 % — SIGNIFICANT CHANGE UP (ref 13–44)
MCHC RBC-ENTMCNC: 26.8 PG — LOW (ref 27–34)
MCHC RBC-ENTMCNC: 31.9 GM/DL — LOW (ref 32–36)
MCV RBC AUTO: 84 FL — SIGNIFICANT CHANGE UP (ref 80–100)
MONOCYTES # BLD AUTO: 0.7 K/UL — SIGNIFICANT CHANGE UP (ref 0–0.9)
MONOCYTES NFR BLD AUTO: 8.8 % — SIGNIFICANT CHANGE UP (ref 2–14)
NEUTROPHILS # BLD AUTO: 4.5 K/UL — SIGNIFICANT CHANGE UP (ref 1.8–7.4)
NEUTROPHILS NFR BLD AUTO: 53.8 % — SIGNIFICANT CHANGE UP (ref 43–77)
PLATELET # BLD AUTO: 212 K/UL — SIGNIFICANT CHANGE UP (ref 150–400)
RBC # BLD: 4.16 M/UL — SIGNIFICANT CHANGE UP (ref 3.8–5.2)
RBC # FLD: 15.5 % — HIGH (ref 10.3–14.5)
WBC # BLD: 8.4 K/UL — SIGNIFICANT CHANGE UP (ref 3.8–10.5)
WBC # FLD AUTO: 8.4 K/UL — SIGNIFICANT CHANGE UP (ref 3.8–10.5)

## 2018-04-13 PROCEDURE — 99214 OFFICE O/P EST MOD 30 MIN: CPT

## 2018-04-13 RX ORDER — TAMOXIFEN CITRATE 20 MG/1
20 TABLET, FILM COATED ORAL
Qty: 120 | Refills: 0 | Status: DISCONTINUED | COMMUNITY
Start: 2018-02-09 | End: 2018-04-13

## 2018-04-13 RX ORDER — MEGESTROL ACETATE 40 MG/1
40 TABLET ORAL
Qty: 240 | Refills: 0 | Status: DISCONTINUED | COMMUNITY
Start: 2018-02-09 | End: 2018-04-13

## 2018-04-16 ENCOUNTER — OUTPATIENT (OUTPATIENT)
Dept: OUTPATIENT SERVICES | Facility: HOSPITAL | Age: 68
LOS: 1 days | End: 2018-04-16

## 2018-04-16 ENCOUNTER — APPOINTMENT (OUTPATIENT)
Dept: MRI IMAGING | Facility: CLINIC | Age: 68
End: 2018-04-16
Payer: MEDICARE

## 2018-04-16 DIAGNOSIS — Z98.42 CATARACT EXTRACTION STATUS, LEFT EYE: Chronic | ICD-10-CM

## 2018-04-16 DIAGNOSIS — C54.1 MALIGNANT NEOPLASM OF ENDOMETRIUM: ICD-10-CM

## 2018-04-16 DIAGNOSIS — Z96.653 PRESENCE OF ARTIFICIAL KNEE JOINT, BILATERAL: Chronic | ICD-10-CM

## 2018-04-16 DIAGNOSIS — Z96.649 PRESENCE OF UNSPECIFIED ARTIFICIAL HIP JOINT: Chronic | ICD-10-CM

## 2018-04-16 PROCEDURE — 72197 MRI PELVIS W/O & W/DYE: CPT | Mod: 26

## 2018-04-20 ENCOUNTER — APPOINTMENT (OUTPATIENT)
Dept: RADIATION ONCOLOGY | Facility: CLINIC | Age: 68
End: 2018-04-20
Payer: MEDICARE

## 2018-04-20 VITALS
TEMPERATURE: 98.8 F | HEART RATE: 108 BPM | SYSTOLIC BLOOD PRESSURE: 153 MMHG | BODY MASS INDEX: 43.41 KG/M2 | OXYGEN SATURATION: 100 % | RESPIRATION RATE: 16 BRPM | WEIGHT: 245 LBS | DIASTOLIC BLOOD PRESSURE: 79 MMHG | HEIGHT: 62.99 IN

## 2018-04-20 PROCEDURE — 99214 OFFICE O/P EST MOD 30 MIN: CPT

## 2018-04-25 ENCOUNTER — APPOINTMENT (OUTPATIENT)
Dept: GYNECOLOGIC ONCOLOGY | Facility: CLINIC | Age: 68
End: 2018-04-25
Payer: MEDICARE

## 2018-04-25 VITALS
HEIGHT: 62 IN | BODY MASS INDEX: 44.9 KG/M2 | WEIGHT: 244 LBS | SYSTOLIC BLOOD PRESSURE: 146 MMHG | DIASTOLIC BLOOD PRESSURE: 84 MMHG

## 2018-04-25 DIAGNOSIS — N76.0 ACUTE VAGINITIS: ICD-10-CM

## 2018-04-25 PROCEDURE — 99214 OFFICE O/P EST MOD 30 MIN: CPT

## 2018-05-04 ENCOUNTER — LABORATORY RESULT (OUTPATIENT)
Age: 68
End: 2018-05-04

## 2018-05-04 ENCOUNTER — RESULT REVIEW (OUTPATIENT)
Age: 68
End: 2018-05-04

## 2018-05-04 ENCOUNTER — APPOINTMENT (OUTPATIENT)
Dept: HEMATOLOGY ONCOLOGY | Facility: CLINIC | Age: 68
End: 2018-05-04
Payer: MEDICARE

## 2018-05-04 ENCOUNTER — APPOINTMENT (OUTPATIENT)
Dept: RADIATION ONCOLOGY | Facility: CLINIC | Age: 68
End: 2018-05-04

## 2018-05-04 VITALS
WEIGHT: 246.9 LBS | SYSTOLIC BLOOD PRESSURE: 150 MMHG | OXYGEN SATURATION: 97 % | TEMPERATURE: 100.1 F | BODY MASS INDEX: 45.16 KG/M2 | DIASTOLIC BLOOD PRESSURE: 80 MMHG | HEART RATE: 109 BPM

## 2018-05-04 DIAGNOSIS — R52 PAIN, UNSPECIFIED: ICD-10-CM

## 2018-05-04 DIAGNOSIS — D50.0 IRON DEFICIENCY ANEMIA SECONDARY TO BLOOD LOSS (CHRONIC): ICD-10-CM

## 2018-05-04 LAB
BASOPHILS # BLD AUTO: 0.1 K/UL — SIGNIFICANT CHANGE UP (ref 0–0.2)
BASOPHILS NFR BLD AUTO: 0.8 % — SIGNIFICANT CHANGE UP (ref 0–2)
EOSINOPHIL # BLD AUTO: 0.2 K/UL — SIGNIFICANT CHANGE UP (ref 0–0.5)
EOSINOPHIL NFR BLD AUTO: 2.3 % — SIGNIFICANT CHANGE UP (ref 0–6)
HCT VFR BLD CALC: 32 % — LOW (ref 34.5–45)
HGB BLD-MCNC: 10.4 G/DL — LOW (ref 11.5–15.5)
LYMPHOCYTES # BLD AUTO: 2.5 K/UL — SIGNIFICANT CHANGE UP (ref 1–3.3)
LYMPHOCYTES # BLD AUTO: 23.6 % — SIGNIFICANT CHANGE UP (ref 13–44)
MCHC RBC-ENTMCNC: 27.2 PG — SIGNIFICANT CHANGE UP (ref 27–34)
MCHC RBC-ENTMCNC: 32.6 GM/DL — SIGNIFICANT CHANGE UP (ref 32–36)
MCV RBC AUTO: 83.4 FL — SIGNIFICANT CHANGE UP (ref 80–100)
MONOCYTES # BLD AUTO: 0.9 K/UL — SIGNIFICANT CHANGE UP (ref 0–0.9)
MONOCYTES NFR BLD AUTO: 8.2 % — SIGNIFICANT CHANGE UP (ref 2–14)
NEUTROPHILS # BLD AUTO: 6.8 K/UL — SIGNIFICANT CHANGE UP (ref 1.8–7.4)
NEUTROPHILS NFR BLD AUTO: 65.1 % — SIGNIFICANT CHANGE UP (ref 43–77)
PLATELET # BLD AUTO: 290 K/UL — SIGNIFICANT CHANGE UP (ref 150–400)
RBC # BLD: 3.84 M/UL — SIGNIFICANT CHANGE UP (ref 3.8–5.2)
RBC # FLD: 14.1 % — SIGNIFICANT CHANGE UP (ref 10.3–14.5)
WBC # BLD: 10.4 K/UL — SIGNIFICANT CHANGE UP (ref 3.8–10.5)
WBC # FLD AUTO: 10.4 K/UL — SIGNIFICANT CHANGE UP (ref 3.8–10.5)

## 2018-05-04 PROCEDURE — 99214 OFFICE O/P EST MOD 30 MIN: CPT

## 2018-05-06 ENCOUNTER — FORM ENCOUNTER (OUTPATIENT)
Age: 68
End: 2018-05-06

## 2018-05-07 ENCOUNTER — APPOINTMENT (OUTPATIENT)
Dept: NUCLEAR MEDICINE | Facility: CLINIC | Age: 68
End: 2018-05-07
Payer: MEDICARE

## 2018-05-07 ENCOUNTER — OUTPATIENT (OUTPATIENT)
Dept: OUTPATIENT SERVICES | Facility: HOSPITAL | Age: 68
LOS: 1 days | End: 2018-05-07

## 2018-05-07 DIAGNOSIS — Z96.653 PRESENCE OF ARTIFICIAL KNEE JOINT, BILATERAL: Chronic | ICD-10-CM

## 2018-05-07 DIAGNOSIS — Z98.42 CATARACT EXTRACTION STATUS, LEFT EYE: Chronic | ICD-10-CM

## 2018-05-07 DIAGNOSIS — Z96.649 PRESENCE OF UNSPECIFIED ARTIFICIAL HIP JOINT: Chronic | ICD-10-CM

## 2018-05-07 DIAGNOSIS — C54.1 MALIGNANT NEOPLASM OF ENDOMETRIUM: ICD-10-CM

## 2018-05-07 LAB
ALBUMIN SERPL ELPH-MCNC: 3.3 G/DL
ALP BLD-CCNC: 70 U/L
ALT SERPL-CCNC: 7 U/L
ANION GAP SERPL CALC-SCNC: 17 MMOL/L
APPEARANCE: CLEAR
AST SERPL-CCNC: 9 U/L
BILIRUB SERPL-MCNC: 0.3 MG/DL
BILIRUBIN URINE: NEGATIVE
BLOOD URINE: ABNORMAL
BUN SERPL-MCNC: 13 MG/DL
CALCIUM SERPL-MCNC: 9 MG/DL
CHLORIDE SERPL-SCNC: 102 MMOL/L
CO2 SERPL-SCNC: 20 MMOL/L
COLOR: YELLOW
CREAT SERPL-MCNC: 0.9 MG/DL
GLUCOSE QUALITATIVE U: NEGATIVE MG/DL
HBV CORE IGM SER QL: NONREACTIVE
HBV SURFACE AB SER QL: REACTIVE
HBV SURFACE AB SERPL IA-ACNC: 370 MIU/ML
HBV SURFACE AG SER QL: NONREACTIVE
HCV AB SER QL: NONREACTIVE
HCV S/CO RATIO: 0.1 S/CO
KETONES URINE: NEGATIVE
LEUKOCYTE ESTERASE URINE: ABNORMAL
MAGNESIUM SERPL-MCNC: 2 MG/DL
NITRITE URINE: NEGATIVE
PH URINE: 5.5
POTASSIUM SERPL-SCNC: 4.6 MMOL/L
PROT SERPL-MCNC: 7 G/DL
PROTEIN URINE: 100 MG/DL
SODIUM SERPL-SCNC: 139 MMOL/L
SPECIFIC GRAVITY URINE: 1.03
UROBILINOGEN URINE: NEGATIVE MG/DL

## 2018-05-07 PROCEDURE — 78815 PET IMAGE W/CT SKULL-THIGH: CPT | Mod: 26,PS

## 2018-05-09 ENCOUNTER — OUTPATIENT (OUTPATIENT)
Dept: OUTPATIENT SERVICES | Facility: HOSPITAL | Age: 68
LOS: 1 days | Discharge: HOME | End: 2018-05-09
Payer: MEDICARE

## 2018-05-09 DIAGNOSIS — Z96.649 PRESENCE OF UNSPECIFIED ARTIFICIAL HIP JOINT: Chronic | ICD-10-CM

## 2018-05-09 DIAGNOSIS — Z96.653 PRESENCE OF ARTIFICIAL KNEE JOINT, BILATERAL: Chronic | ICD-10-CM

## 2018-05-09 DIAGNOSIS — Z98.42 CATARACT EXTRACTION STATUS, LEFT EYE: Chronic | ICD-10-CM

## 2018-05-09 DIAGNOSIS — C54.1 MALIGNANT NEOPLASM OF ENDOMETRIUM: ICD-10-CM

## 2018-05-11 ENCOUNTER — RESULT REVIEW (OUTPATIENT)
Age: 68
End: 2018-05-11

## 2018-05-11 ENCOUNTER — LABORATORY RESULT (OUTPATIENT)
Age: 68
End: 2018-05-11

## 2018-05-11 ENCOUNTER — APPOINTMENT (OUTPATIENT)
Dept: INFUSION THERAPY | Facility: CLINIC | Age: 68
End: 2018-05-11

## 2018-05-11 LAB
BASOPHILS # BLD AUTO: 0.1 K/UL — SIGNIFICANT CHANGE UP (ref 0–0.2)
BASOPHILS NFR BLD AUTO: 1 % — SIGNIFICANT CHANGE UP (ref 0–2)
EOSINOPHIL # BLD AUTO: 0.2 K/UL — SIGNIFICANT CHANGE UP (ref 0–0.5)
EOSINOPHIL NFR BLD AUTO: 2 % — SIGNIFICANT CHANGE UP (ref 0–6)
HCT VFR BLD CALC: 33.4 % — LOW (ref 34.5–45)
HGB BLD-MCNC: 10.6 G/DL — LOW (ref 11.5–15.5)
LYMPHOCYTES # BLD AUTO: 2 K/UL — SIGNIFICANT CHANGE UP (ref 1–3.3)
LYMPHOCYTES # BLD AUTO: 22.3 % — SIGNIFICANT CHANGE UP (ref 13–44)
MCHC RBC-ENTMCNC: 26.4 PG — LOW (ref 27–34)
MCHC RBC-ENTMCNC: 31.6 GM/DL — LOW (ref 32–36)
MCV RBC AUTO: 83.6 FL — SIGNIFICANT CHANGE UP (ref 80–100)
MONOCYTES # BLD AUTO: 0.7 K/UL — SIGNIFICANT CHANGE UP (ref 0–0.9)
MONOCYTES NFR BLD AUTO: 7.5 % — SIGNIFICANT CHANGE UP (ref 2–14)
NEUTROPHILS # BLD AUTO: 6.1 K/UL — SIGNIFICANT CHANGE UP (ref 1.8–7.4)
NEUTROPHILS NFR BLD AUTO: 67.3 % — SIGNIFICANT CHANGE UP (ref 43–77)
PLATELET # BLD AUTO: 299 K/UL — SIGNIFICANT CHANGE UP (ref 150–400)
RBC # BLD: 4 M/UL — SIGNIFICANT CHANGE UP (ref 3.8–5.2)
RBC # FLD: 14 % — SIGNIFICANT CHANGE UP (ref 10.3–14.5)
WBC # BLD: 9.1 K/UL — SIGNIFICANT CHANGE UP (ref 3.8–10.5)
WBC # FLD AUTO: 9.1 K/UL — SIGNIFICANT CHANGE UP (ref 3.8–10.5)

## 2018-05-11 PROCEDURE — 93010 ELECTROCARDIOGRAM REPORT: CPT

## 2018-05-11 RX ORDER — ANASTROZOLE 1 MG/1
1 TABLET ORAL
Qty: 0 | Refills: 0 | COMMUNITY

## 2018-05-14 DIAGNOSIS — Z51.89 ENCOUNTER FOR OTHER SPECIFIED AFTERCARE: ICD-10-CM

## 2018-05-14 DIAGNOSIS — R11.2 NAUSEA WITH VOMITING, UNSPECIFIED: ICD-10-CM

## 2018-05-14 DIAGNOSIS — Z51.11 ENCOUNTER FOR ANTINEOPLASTIC CHEMOTHERAPY: ICD-10-CM

## 2018-05-14 DIAGNOSIS — D50.0 IRON DEFICIENCY ANEMIA SECONDARY TO BLOOD LOSS (CHRONIC): ICD-10-CM

## 2018-05-15 PROBLEM — R52 PAIN: Status: ACTIVE | Noted: 2018-05-15

## 2018-05-24 RX ORDER — SODIUM CHLORIDE 9 MG/ML
3 INJECTION INTRAMUSCULAR; INTRAVENOUS; SUBCUTANEOUS ONCE
Qty: 0 | Refills: 0 | Status: DISCONTINUED | OUTPATIENT
Start: 2018-05-29 | End: 2018-06-13

## 2018-05-24 RX ORDER — CEFAZOLIN SODIUM 1 G
2000 VIAL (EA) INJECTION ONCE
Qty: 0 | Refills: 0 | Status: DISCONTINUED | OUTPATIENT
Start: 2018-05-29 | End: 2018-06-13

## 2018-05-28 ENCOUNTER — FORM ENCOUNTER (OUTPATIENT)
Age: 68
End: 2018-05-28

## 2018-05-29 ENCOUNTER — OUTPATIENT (OUTPATIENT)
Dept: OUTPATIENT SERVICES | Facility: HOSPITAL | Age: 68
LOS: 1 days | Discharge: HOME | End: 2018-05-29
Payer: MEDICARE

## 2018-05-29 VITALS
HEIGHT: 64 IN | SYSTOLIC BLOOD PRESSURE: 146 MMHG | WEIGHT: 235.01 LBS | DIASTOLIC BLOOD PRESSURE: 76 MMHG | HEART RATE: 108 BPM | TEMPERATURE: 99 F | RESPIRATION RATE: 16 BRPM | OXYGEN SATURATION: 100 %

## 2018-05-29 VITALS
TEMPERATURE: 98 F | DIASTOLIC BLOOD PRESSURE: 58 MMHG | RESPIRATION RATE: 16 BRPM | HEART RATE: 114 BPM | SYSTOLIC BLOOD PRESSURE: 140 MMHG | OXYGEN SATURATION: 100 %

## 2018-05-29 DIAGNOSIS — C54.1 MALIGNANT NEOPLASM OF ENDOMETRIUM: ICD-10-CM

## 2018-05-29 DIAGNOSIS — Z96.649 PRESENCE OF UNSPECIFIED ARTIFICIAL HIP JOINT: Chronic | ICD-10-CM

## 2018-05-29 DIAGNOSIS — Z96.653 PRESENCE OF ARTIFICIAL KNEE JOINT, BILATERAL: Chronic | ICD-10-CM

## 2018-05-29 DIAGNOSIS — Z98.42 CATARACT EXTRACTION STATUS, LEFT EYE: Chronic | ICD-10-CM

## 2018-05-29 LAB
APTT BLD: 28.4 SEC — SIGNIFICANT CHANGE UP (ref 27.5–37.4)
INR BLD: 1.08 RATIO — SIGNIFICANT CHANGE UP (ref 0.88–1.16)
PROTHROM AB SERPL-ACNC: 11.9 SEC — SIGNIFICANT CHANGE UP (ref 9.8–12.7)

## 2018-05-29 PROCEDURE — 77001 FLUOROGUIDE FOR VEIN DEVICE: CPT

## 2018-05-29 PROCEDURE — 85730 THROMBOPLASTIN TIME PARTIAL: CPT

## 2018-05-29 PROCEDURE — 36561 INSERT TUNNELED CV CATH: CPT

## 2018-05-29 PROCEDURE — 77001 FLUOROGUIDE FOR VEIN DEVICE: CPT | Mod: 26

## 2018-05-29 PROCEDURE — 76937 US GUIDE VASCULAR ACCESS: CPT | Mod: 26

## 2018-05-29 PROCEDURE — 76942 ECHO GUIDE FOR BIOPSY: CPT

## 2018-05-29 PROCEDURE — C1788: CPT

## 2018-05-29 PROCEDURE — 85610 PROTHROMBIN TIME: CPT

## 2018-05-29 PROCEDURE — 36415 COLL VENOUS BLD VENIPUNCTURE: CPT

## 2018-05-29 NOTE — ASU DISCHARGE PLAN (ADULT/PEDIATRIC). - MEDICATION SUMMARY - MEDICATIONS TO TAKE
I will START or STAY ON the medications listed below when I get home from the hospital:    iron  -- 1 tab(s) by mouth 3 times a day  -- Indication: For per PMD    gabapentin 300 mg oral capsule  -- 1  by mouth 2 times a day  -- Indication: For per PMD    Vitamin B-12 1000 mcg oral tablet  -- 1 tab(s) by mouth once a day  -- Indication: For per PMD

## 2018-05-29 NOTE — BRIEF OPERATIVE NOTE - PROCEDURE
<<-----Click on this checkbox to enter Procedure Insertion of catheter with port  05/29/2018    Active  HALPER

## 2018-06-01 ENCOUNTER — APPOINTMENT (OUTPATIENT)
Dept: HEMATOLOGY ONCOLOGY | Facility: CLINIC | Age: 68
End: 2018-06-01
Payer: MEDICARE

## 2018-06-01 ENCOUNTER — RESULT REVIEW (OUTPATIENT)
Age: 68
End: 2018-06-01

## 2018-06-01 ENCOUNTER — APPOINTMENT (OUTPATIENT)
Dept: INFUSION THERAPY | Facility: CLINIC | Age: 68
End: 2018-06-01

## 2018-06-01 ENCOUNTER — LABORATORY RESULT (OUTPATIENT)
Age: 68
End: 2018-06-01

## 2018-06-01 VITALS
WEIGHT: 239.75 LBS | HEIGHT: 62 IN | BODY MASS INDEX: 44.12 KG/M2 | TEMPERATURE: 98.9 F | DIASTOLIC BLOOD PRESSURE: 87 MMHG | HEART RATE: 119 BPM | OXYGEN SATURATION: 95 % | SYSTOLIC BLOOD PRESSURE: 146 MMHG

## 2018-06-01 LAB
BASOPHILS # BLD AUTO: 0.1 K/UL — SIGNIFICANT CHANGE UP (ref 0–0.2)
BASOPHILS NFR BLD AUTO: 1.3 % — SIGNIFICANT CHANGE UP (ref 0–2)
EOSINOPHIL # BLD AUTO: 0.2 K/UL — SIGNIFICANT CHANGE UP (ref 0–0.5)
EOSINOPHIL NFR BLD AUTO: 2.9 % — SIGNIFICANT CHANGE UP (ref 0–6)
HCT VFR BLD CALC: 30.1 % — LOW (ref 34.5–45)
HGB BLD-MCNC: 9.8 G/DL — LOW (ref 11.5–15.5)
LYMPHOCYTES # BLD AUTO: 2.6 K/UL — SIGNIFICANT CHANGE UP (ref 1–3.3)
LYMPHOCYTES # BLD AUTO: 40.8 % — SIGNIFICANT CHANGE UP (ref 13–44)
MCHC RBC-ENTMCNC: 27.4 PG — SIGNIFICANT CHANGE UP (ref 27–34)
MCHC RBC-ENTMCNC: 32.6 GM/DL — SIGNIFICANT CHANGE UP (ref 32–36)
MCV RBC AUTO: 84 FL — SIGNIFICANT CHANGE UP (ref 80–100)
MONOCYTES # BLD AUTO: 0.7 K/UL — SIGNIFICANT CHANGE UP (ref 0–0.9)
MONOCYTES NFR BLD AUTO: 11.2 % — SIGNIFICANT CHANGE UP (ref 2–14)
NEUTROPHILS # BLD AUTO: 2.7 K/UL — SIGNIFICANT CHANGE UP (ref 1.8–7.4)
NEUTROPHILS NFR BLD AUTO: 43.7 % — SIGNIFICANT CHANGE UP (ref 43–77)
PLATELET # BLD AUTO: 327 K/UL — SIGNIFICANT CHANGE UP (ref 150–400)
RBC # BLD: 3.58 M/UL — LOW (ref 3.8–5.2)
RBC # FLD: 14.9 % — HIGH (ref 10.3–14.5)
WBC # BLD: 6.3 K/UL — SIGNIFICANT CHANGE UP (ref 3.8–10.5)
WBC # FLD AUTO: 6.3 K/UL — SIGNIFICANT CHANGE UP (ref 3.8–10.5)

## 2018-06-01 PROCEDURE — 99214 OFFICE O/P EST MOD 30 MIN: CPT

## 2018-06-01 RX ORDER — FERROUS SULFATE 325(65) MG
325 (65 FE) TABLET ORAL DAILY
Refills: 0 | Status: DISCONTINUED | COMMUNITY
Start: 2018-04-13 | End: 2018-06-01

## 2018-06-01 RX ORDER — GABAPENTIN 300 MG/1
300 CAPSULE ORAL
Qty: 60 | Refills: 0 | Status: DISCONTINUED | COMMUNITY
Start: 2017-12-28 | End: 2018-06-01

## 2018-06-13 ENCOUNTER — RX RENEWAL (OUTPATIENT)
Age: 68
End: 2018-06-13

## 2018-06-20 ENCOUNTER — OUTPATIENT (OUTPATIENT)
Dept: OUTPATIENT SERVICES | Facility: HOSPITAL | Age: 68
LOS: 1 days | Discharge: HOME | End: 2018-06-20

## 2018-06-20 DIAGNOSIS — Z96.649 PRESENCE OF UNSPECIFIED ARTIFICIAL HIP JOINT: Chronic | ICD-10-CM

## 2018-06-20 DIAGNOSIS — Z98.42 CATARACT EXTRACTION STATUS, LEFT EYE: Chronic | ICD-10-CM

## 2018-06-20 DIAGNOSIS — Z96.653 PRESENCE OF ARTIFICIAL KNEE JOINT, BILATERAL: Chronic | ICD-10-CM

## 2018-06-20 DIAGNOSIS — C54.1 MALIGNANT NEOPLASM OF ENDOMETRIUM: ICD-10-CM

## 2018-06-22 ENCOUNTER — APPOINTMENT (OUTPATIENT)
Dept: HEMATOLOGY ONCOLOGY | Facility: CLINIC | Age: 68
End: 2018-06-22
Payer: MEDICARE

## 2018-06-22 ENCOUNTER — LABORATORY RESULT (OUTPATIENT)
Age: 68
End: 2018-06-22

## 2018-06-22 ENCOUNTER — RESULT REVIEW (OUTPATIENT)
Age: 68
End: 2018-06-22

## 2018-06-22 ENCOUNTER — APPOINTMENT (OUTPATIENT)
Dept: INFUSION THERAPY | Facility: CLINIC | Age: 68
End: 2018-06-22

## 2018-06-22 DIAGNOSIS — R06.09 OTHER FORMS OF DYSPNEA: ICD-10-CM

## 2018-06-22 LAB
BASOPHILS # BLD AUTO: 0.1 K/UL — SIGNIFICANT CHANGE UP (ref 0–0.2)
BASOPHILS NFR BLD AUTO: 1 % — SIGNIFICANT CHANGE UP (ref 0–2)
EOSINOPHIL # BLD AUTO: 0.1 K/UL — SIGNIFICANT CHANGE UP (ref 0–0.5)
EOSINOPHIL NFR BLD AUTO: 1.3 % — SIGNIFICANT CHANGE UP (ref 0–6)
HCT VFR BLD CALC: 27.4 % — LOW (ref 34.5–45)
HGB BLD-MCNC: 8.8 G/DL — LOW (ref 11.5–15.5)
LYMPHOCYTES # BLD AUTO: 2.6 K/UL — SIGNIFICANT CHANGE UP (ref 1–3.3)
LYMPHOCYTES # BLD AUTO: 48.7 % — HIGH (ref 13–44)
MCHC RBC-ENTMCNC: 27.6 PG — SIGNIFICANT CHANGE UP (ref 27–34)
MCHC RBC-ENTMCNC: 32 GM/DL — SIGNIFICANT CHANGE UP (ref 32–36)
MCV RBC AUTO: 86.3 FL — SIGNIFICANT CHANGE UP (ref 80–100)
MONOCYTES # BLD AUTO: 0.7 K/UL — SIGNIFICANT CHANGE UP (ref 0–0.9)
MONOCYTES NFR BLD AUTO: 13.2 % — SIGNIFICANT CHANGE UP (ref 2–14)
NEUTROPHILS # BLD AUTO: 1.9 K/UL — SIGNIFICANT CHANGE UP (ref 1.8–7.4)
NEUTROPHILS NFR BLD AUTO: 35.8 % — LOW (ref 43–77)
PLATELET # BLD AUTO: 184 K/UL — SIGNIFICANT CHANGE UP (ref 150–400)
RBC # BLD: 3.17 M/UL — LOW (ref 3.8–5.2)
RBC # FLD: 18.8 % — HIGH (ref 10.3–14.5)
WBC # BLD: 5.4 K/UL — SIGNIFICANT CHANGE UP (ref 3.8–10.5)
WBC # FLD AUTO: 5.4 K/UL — SIGNIFICANT CHANGE UP (ref 3.8–10.5)

## 2018-06-22 PROCEDURE — 99214 OFFICE O/P EST MOD 30 MIN: CPT

## 2018-06-25 DIAGNOSIS — Z51.89 ENCOUNTER FOR OTHER SPECIFIED AFTERCARE: ICD-10-CM

## 2018-06-25 DIAGNOSIS — R11.2 NAUSEA WITH VOMITING, UNSPECIFIED: ICD-10-CM

## 2018-06-25 DIAGNOSIS — Z51.11 ENCOUNTER FOR ANTINEOPLASTIC CHEMOTHERAPY: ICD-10-CM

## 2018-06-27 ENCOUNTER — LABORATORY RESULT (OUTPATIENT)
Age: 68
End: 2018-06-27

## 2018-06-29 DIAGNOSIS — D64.81 ANEMIA DUE TO ANTINEOPLASTIC CHEMOTHERAPY: ICD-10-CM

## 2018-07-03 ENCOUNTER — APPOINTMENT (OUTPATIENT)
Dept: CARDIOLOGY | Facility: CLINIC | Age: 68
End: 2018-07-03

## 2018-07-05 ENCOUNTER — RX RENEWAL (OUTPATIENT)
Age: 68
End: 2018-07-05

## 2018-07-09 ENCOUNTER — FORM ENCOUNTER (OUTPATIENT)
Age: 68
End: 2018-07-09

## 2018-07-10 ENCOUNTER — OUTPATIENT (OUTPATIENT)
Dept: OUTPATIENT SERVICES | Facility: HOSPITAL | Age: 68
LOS: 1 days | End: 2018-07-10
Payer: MEDICARE

## 2018-07-10 DIAGNOSIS — R06.09 OTHER FORMS OF DYSPNEA: ICD-10-CM

## 2018-07-10 DIAGNOSIS — C54.1 MALIGNANT NEOPLASM OF ENDOMETRIUM: ICD-10-CM

## 2018-07-10 DIAGNOSIS — Z96.653 PRESENCE OF ARTIFICIAL KNEE JOINT, BILATERAL: Chronic | ICD-10-CM

## 2018-07-10 DIAGNOSIS — Z98.42 CATARACT EXTRACTION STATUS, LEFT EYE: Chronic | ICD-10-CM

## 2018-07-10 DIAGNOSIS — Z96.649 PRESENCE OF UNSPECIFIED ARTIFICIAL HIP JOINT: Chronic | ICD-10-CM

## 2018-07-10 PROCEDURE — 93306 TTE W/DOPPLER COMPLETE: CPT

## 2018-07-13 ENCOUNTER — RESULT REVIEW (OUTPATIENT)
Age: 68
End: 2018-07-13

## 2018-07-13 ENCOUNTER — APPOINTMENT (OUTPATIENT)
Dept: INFUSION THERAPY | Facility: CLINIC | Age: 68
End: 2018-07-13

## 2018-07-13 ENCOUNTER — APPOINTMENT (OUTPATIENT)
Dept: HEMATOLOGY ONCOLOGY | Facility: CLINIC | Age: 68
End: 2018-07-13
Payer: MEDICARE

## 2018-07-13 ENCOUNTER — LABORATORY RESULT (OUTPATIENT)
Age: 68
End: 2018-07-13

## 2018-07-13 ENCOUNTER — OUTPATIENT (OUTPATIENT)
Dept: OUTPATIENT SERVICES | Facility: HOSPITAL | Age: 68
LOS: 1 days | End: 2018-07-13
Payer: MEDICARE

## 2018-07-13 DIAGNOSIS — Z98.42 CATARACT EXTRACTION STATUS, LEFT EYE: Chronic | ICD-10-CM

## 2018-07-13 DIAGNOSIS — D64.9 ANEMIA, UNSPECIFIED: ICD-10-CM

## 2018-07-13 DIAGNOSIS — Z96.649 PRESENCE OF UNSPECIFIED ARTIFICIAL HIP JOINT: Chronic | ICD-10-CM

## 2018-07-13 DIAGNOSIS — G62.9 POLYNEUROPATHY, UNSPECIFIED: ICD-10-CM

## 2018-07-13 DIAGNOSIS — Z96.653 PRESENCE OF ARTIFICIAL KNEE JOINT, BILATERAL: Chronic | ICD-10-CM

## 2018-07-13 LAB
BASOPHILS # BLD AUTO: 0 K/UL — SIGNIFICANT CHANGE UP (ref 0–0.2)
BASOPHILS NFR BLD AUTO: 0.7 % — SIGNIFICANT CHANGE UP (ref 0–2)
BLD GP AB SCN SERPL QL: SIGNIFICANT CHANGE UP
EOSINOPHIL # BLD AUTO: 0.1 K/UL — SIGNIFICANT CHANGE UP (ref 0–0.5)
EOSINOPHIL NFR BLD AUTO: 1.6 % — SIGNIFICANT CHANGE UP (ref 0–6)
HCT VFR BLD CALC: 24.5 % — LOW (ref 34.5–45)
HGB BLD-MCNC: 8 G/DL — LOW (ref 11.5–15.5)
LYMPHOCYTES # BLD AUTO: 1.9 K/UL — SIGNIFICANT CHANGE UP (ref 1–3.3)
LYMPHOCYTES # BLD AUTO: 39 % — SIGNIFICANT CHANGE UP (ref 13–44)
MCHC RBC-ENTMCNC: 29.5 PG — SIGNIFICANT CHANGE UP (ref 27–34)
MCHC RBC-ENTMCNC: 32.6 GM/DL — SIGNIFICANT CHANGE UP (ref 32–36)
MCV RBC AUTO: 90.5 FL — SIGNIFICANT CHANGE UP (ref 80–100)
MONOCYTES # BLD AUTO: 0.6 K/UL — SIGNIFICANT CHANGE UP (ref 0–0.9)
MONOCYTES NFR BLD AUTO: 13.2 % — SIGNIFICANT CHANGE UP (ref 2–14)
NEUTROPHILS # BLD AUTO: 2.2 K/UL — SIGNIFICANT CHANGE UP (ref 1.8–7.4)
NEUTROPHILS NFR BLD AUTO: 45.5 % — SIGNIFICANT CHANGE UP (ref 43–77)
PLATELET # BLD AUTO: 105 K/UL — LOW (ref 150–400)
RBC # BLD: 2.71 M/UL — LOW (ref 3.8–5.2)
RBC # FLD: 22.3 % — HIGH (ref 10.3–14.5)
TYPE + AB SCN PNL BLD: SIGNIFICANT CHANGE UP
WBC # BLD: 4.9 K/UL — SIGNIFICANT CHANGE UP (ref 3.8–10.5)
WBC # FLD AUTO: 4.9 K/UL — SIGNIFICANT CHANGE UP (ref 3.8–10.5)

## 2018-07-13 PROCEDURE — 99214 OFFICE O/P EST MOD 30 MIN: CPT

## 2018-07-13 RX ORDER — ACETAMINOPHEN 500 MG
650 TABLET ORAL ONCE
Qty: 0 | Refills: 0 | Status: COMPLETED | OUTPATIENT
Start: 2018-07-14 | End: 2018-07-14

## 2018-07-14 ENCOUNTER — OUTPATIENT (OUTPATIENT)
Dept: OUTPATIENT SERVICES | Facility: HOSPITAL | Age: 68
LOS: 1 days | End: 2018-07-14
Payer: MEDICARE

## 2018-07-14 VITALS
OXYGEN SATURATION: 98 % | DIASTOLIC BLOOD PRESSURE: 76 MMHG | HEART RATE: 108 BPM | SYSTOLIC BLOOD PRESSURE: 111 MMHG | TEMPERATURE: 98 F | RESPIRATION RATE: 18 BRPM

## 2018-07-14 VITALS
RESPIRATION RATE: 18 BRPM | HEIGHT: 65 IN | OXYGEN SATURATION: 97 % | DIASTOLIC BLOOD PRESSURE: 77 MMHG | HEART RATE: 104 BPM | TEMPERATURE: 99 F | SYSTOLIC BLOOD PRESSURE: 151 MMHG | WEIGHT: 260.15 LBS

## 2018-07-14 DIAGNOSIS — Z96.649 PRESENCE OF UNSPECIFIED ARTIFICIAL HIP JOINT: Chronic | ICD-10-CM

## 2018-07-14 DIAGNOSIS — Z96.653 PRESENCE OF ARTIFICIAL KNEE JOINT, BILATERAL: Chronic | ICD-10-CM

## 2018-07-14 DIAGNOSIS — Z98.42 CATARACT EXTRACTION STATUS, LEFT EYE: Chronic | ICD-10-CM

## 2018-07-14 DIAGNOSIS — D64.89 OTHER SPECIFIED ANEMIAS: ICD-10-CM

## 2018-07-14 LAB — ABO RH CONFIRMATION: SIGNIFICANT CHANGE UP

## 2018-07-14 PROCEDURE — 86901 BLOOD TYPING SEROLOGIC RH(D): CPT

## 2018-07-14 PROCEDURE — P9016: CPT

## 2018-07-14 PROCEDURE — 36415 COLL VENOUS BLD VENIPUNCTURE: CPT

## 2018-07-14 PROCEDURE — 36430 TRANSFUSION BLD/BLD COMPNT: CPT

## 2018-07-14 PROCEDURE — 86923 COMPATIBILITY TEST ELECTRIC: CPT

## 2018-07-14 PROCEDURE — 86850 RBC ANTIBODY SCREEN: CPT

## 2018-07-14 PROCEDURE — 86900 BLOOD TYPING SEROLOGIC ABO: CPT

## 2018-07-14 RX ADMIN — Medication 650 MILLIGRAM(S): at 12:00

## 2018-07-16 ENCOUNTER — OTHER (OUTPATIENT)
Age: 68
End: 2018-07-16

## 2018-07-24 PROBLEM — H26.9 UNSPECIFIED CATARACT: Chronic | Status: ACTIVE | Noted: 2017-10-04

## 2018-07-24 PROBLEM — C54.1 MALIGNANT NEOPLASM OF ENDOMETRIUM: Chronic | Status: ACTIVE | Noted: 2017-10-04

## 2018-07-24 PROBLEM — Z92.3 PERSONAL HISTORY OF IRRADIATION: Chronic | Status: ACTIVE | Noted: 2017-10-04

## 2018-08-02 ENCOUNTER — OUTPATIENT (OUTPATIENT)
Dept: OUTPATIENT SERVICES | Facility: HOSPITAL | Age: 68
LOS: 1 days | Discharge: HOME | End: 2018-08-02

## 2018-08-02 DIAGNOSIS — Z96.649 PRESENCE OF UNSPECIFIED ARTIFICIAL HIP JOINT: Chronic | ICD-10-CM

## 2018-08-02 DIAGNOSIS — Z96.653 PRESENCE OF ARTIFICIAL KNEE JOINT, BILATERAL: Chronic | ICD-10-CM

## 2018-08-02 DIAGNOSIS — Z98.42 CATARACT EXTRACTION STATUS, LEFT EYE: Chronic | ICD-10-CM

## 2018-08-02 DIAGNOSIS — C54.1 MALIGNANT NEOPLASM OF ENDOMETRIUM: ICD-10-CM

## 2018-08-06 ENCOUNTER — FORM ENCOUNTER (OUTPATIENT)
Age: 68
End: 2018-08-06

## 2018-08-07 ENCOUNTER — APPOINTMENT (OUTPATIENT)
Dept: CT IMAGING | Facility: CLINIC | Age: 68
End: 2018-08-07
Payer: MEDICARE

## 2018-08-07 ENCOUNTER — OUTPATIENT (OUTPATIENT)
Dept: OUTPATIENT SERVICES | Facility: HOSPITAL | Age: 68
LOS: 1 days | End: 2018-08-07

## 2018-08-07 DIAGNOSIS — Z98.42 CATARACT EXTRACTION STATUS, LEFT EYE: Chronic | ICD-10-CM

## 2018-08-07 DIAGNOSIS — Z96.653 PRESENCE OF ARTIFICIAL KNEE JOINT, BILATERAL: Chronic | ICD-10-CM

## 2018-08-07 DIAGNOSIS — C54.1 MALIGNANT NEOPLASM OF ENDOMETRIUM: ICD-10-CM

## 2018-08-07 DIAGNOSIS — Z96.649 PRESENCE OF UNSPECIFIED ARTIFICIAL HIP JOINT: Chronic | ICD-10-CM

## 2018-08-07 PROCEDURE — 74177 CT ABD & PELVIS W/CONTRAST: CPT | Mod: 26

## 2018-08-07 PROCEDURE — 71260 CT THORAX DX C+: CPT | Mod: 26

## 2018-08-10 ENCOUNTER — APPOINTMENT (OUTPATIENT)
Dept: HEMATOLOGY ONCOLOGY | Facility: CLINIC | Age: 68
End: 2018-08-10
Payer: MEDICARE

## 2018-08-10 VITALS
HEART RATE: 125 BPM | BODY MASS INDEX: 45.78 KG/M2 | SYSTOLIC BLOOD PRESSURE: 158 MMHG | OXYGEN SATURATION: 96 % | TEMPERATURE: 98.6 F | WEIGHT: 248.79 LBS | DIASTOLIC BLOOD PRESSURE: 83 MMHG | HEIGHT: 62 IN

## 2018-08-10 PROCEDURE — 99214 OFFICE O/P EST MOD 30 MIN: CPT

## 2018-08-17 ENCOUNTER — RESULT REVIEW (OUTPATIENT)
Age: 68
End: 2018-08-17

## 2018-08-17 ENCOUNTER — LABORATORY RESULT (OUTPATIENT)
Age: 68
End: 2018-08-17

## 2018-08-17 ENCOUNTER — APPOINTMENT (OUTPATIENT)
Dept: INFUSION THERAPY | Facility: CLINIC | Age: 68
End: 2018-08-17

## 2018-08-17 LAB
BASOPHILS # BLD AUTO: 0.1 K/UL — SIGNIFICANT CHANGE UP (ref 0–0.2)
BASOPHILS NFR BLD AUTO: 1.3 % — SIGNIFICANT CHANGE UP (ref 0–2)
EOSINOPHIL # BLD AUTO: 0.1 K/UL — SIGNIFICANT CHANGE UP (ref 0–0.5)
EOSINOPHIL NFR BLD AUTO: 2.4 % — SIGNIFICANT CHANGE UP (ref 0–6)
HCT VFR BLD CALC: 29.3 % — LOW (ref 34.5–45)
HGB BLD-MCNC: 9.8 G/DL — LOW (ref 11.5–15.5)
LYMPHOCYTES # BLD AUTO: 2 K/UL — SIGNIFICANT CHANGE UP (ref 1–3.3)
LYMPHOCYTES # BLD AUTO: 40.4 % — SIGNIFICANT CHANGE UP (ref 13–44)
MCHC RBC-ENTMCNC: 31.5 PG — SIGNIFICANT CHANGE UP (ref 27–34)
MCHC RBC-ENTMCNC: 33.4 GM/DL — SIGNIFICANT CHANGE UP (ref 32–36)
MCV RBC AUTO: 94.2 FL — SIGNIFICANT CHANGE UP (ref 80–100)
MONOCYTES # BLD AUTO: 0.5 K/UL — SIGNIFICANT CHANGE UP (ref 0–0.9)
MONOCYTES NFR BLD AUTO: 9.9 % — SIGNIFICANT CHANGE UP (ref 2–14)
NEUTROPHILS # BLD AUTO: 2.3 K/UL — SIGNIFICANT CHANGE UP (ref 1.8–7.4)
NEUTROPHILS NFR BLD AUTO: 46 % — SIGNIFICANT CHANGE UP (ref 43–77)
PLATELET # BLD AUTO: 193 K/UL — SIGNIFICANT CHANGE UP (ref 150–400)
RBC # BLD: 3.11 M/UL — LOW (ref 3.8–5.2)
RBC # FLD: 16.8 % — HIGH (ref 10.3–14.5)
WBC # BLD: 5 K/UL — SIGNIFICANT CHANGE UP (ref 3.8–10.5)
WBC # FLD AUTO: 5 K/UL — SIGNIFICANT CHANGE UP (ref 3.8–10.5)

## 2018-08-20 DIAGNOSIS — T45.1X5A ADVERSE EFFECT OF ANTINEOPLASTIC AND IMMUNOSUPPRESSIVE DRUGS, INITIAL ENCOUNTER: ICD-10-CM

## 2018-08-20 DIAGNOSIS — R11.2 NAUSEA WITH VOMITING, UNSPECIFIED: ICD-10-CM

## 2018-08-20 DIAGNOSIS — Z51.89 ENCOUNTER FOR OTHER SPECIFIED AFTERCARE: ICD-10-CM

## 2018-08-20 DIAGNOSIS — D64.81 ANEMIA DUE TO ANTINEOPLASTIC CHEMOTHERAPY: ICD-10-CM

## 2018-08-20 DIAGNOSIS — Z51.11 ENCOUNTER FOR ANTINEOPLASTIC CHEMOTHERAPY: ICD-10-CM

## 2018-08-30 ENCOUNTER — OUTPATIENT (OUTPATIENT)
Dept: OUTPATIENT SERVICES | Facility: HOSPITAL | Age: 68
LOS: 1 days | Discharge: HOME | End: 2018-08-30

## 2018-08-30 DIAGNOSIS — C54.1 MALIGNANT NEOPLASM OF ENDOMETRIUM: ICD-10-CM

## 2018-08-30 DIAGNOSIS — Z98.42 CATARACT EXTRACTION STATUS, LEFT EYE: Chronic | ICD-10-CM

## 2018-08-30 DIAGNOSIS — Z96.653 PRESENCE OF ARTIFICIAL KNEE JOINT, BILATERAL: Chronic | ICD-10-CM

## 2018-08-30 DIAGNOSIS — Z96.649 PRESENCE OF UNSPECIFIED ARTIFICIAL HIP JOINT: Chronic | ICD-10-CM

## 2018-09-07 ENCOUNTER — APPOINTMENT (OUTPATIENT)
Dept: HEMATOLOGY ONCOLOGY | Facility: CLINIC | Age: 68
End: 2018-09-07
Payer: MEDICARE

## 2018-09-07 ENCOUNTER — APPOINTMENT (OUTPATIENT)
Dept: INFUSION THERAPY | Facility: CLINIC | Age: 68
End: 2018-09-07

## 2018-09-07 ENCOUNTER — LABORATORY RESULT (OUTPATIENT)
Age: 68
End: 2018-09-07

## 2018-09-07 ENCOUNTER — RESULT REVIEW (OUTPATIENT)
Age: 68
End: 2018-09-07

## 2018-09-07 ENCOUNTER — OTHER (OUTPATIENT)
Age: 68
End: 2018-09-07

## 2018-09-07 DIAGNOSIS — D69.6 THROMBOCYTOPENIA, UNSPECIFIED: ICD-10-CM

## 2018-09-07 LAB
BASOPHILS # BLD AUTO: 0.1 K/UL — SIGNIFICANT CHANGE UP (ref 0–0.2)
BASOPHILS NFR BLD AUTO: 1.3 % — SIGNIFICANT CHANGE UP (ref 0–2)
EOSINOPHIL # BLD AUTO: 0.1 K/UL — SIGNIFICANT CHANGE UP (ref 0–0.5)
EOSINOPHIL NFR BLD AUTO: 1.7 % — SIGNIFICANT CHANGE UP (ref 0–6)
HCT VFR BLD CALC: 28.9 % — LOW (ref 34.5–45)
HGB BLD-MCNC: 9.1 G/DL — LOW (ref 11.5–15.5)
LYMPHOCYTES # BLD AUTO: 1.6 K/UL — SIGNIFICANT CHANGE UP (ref 1–3.3)
LYMPHOCYTES # BLD AUTO: 35.3 % — SIGNIFICANT CHANGE UP (ref 13–44)
MCHC RBC-ENTMCNC: 30.2 PG — SIGNIFICANT CHANGE UP (ref 27–34)
MCHC RBC-ENTMCNC: 31.5 GM/DL — LOW (ref 32–36)
MCV RBC AUTO: 95.9 FL — SIGNIFICANT CHANGE UP (ref 80–100)
MONOCYTES # BLD AUTO: 0.6 K/UL — SIGNIFICANT CHANGE UP (ref 0–0.9)
MONOCYTES NFR BLD AUTO: 13.6 % — SIGNIFICANT CHANGE UP (ref 2–14)
NEUTROPHILS # BLD AUTO: 2.2 K/UL — SIGNIFICANT CHANGE UP (ref 1.8–7.4)
NEUTROPHILS NFR BLD AUTO: 48.1 % — SIGNIFICANT CHANGE UP (ref 43–77)
PLATELET # BLD AUTO: 124 K/UL — LOW (ref 150–400)
RBC # BLD: 3.01 M/UL — LOW (ref 3.8–5.2)
RBC # FLD: 15.2 % — HIGH (ref 10.3–14.5)
WBC # BLD: 4.5 K/UL — SIGNIFICANT CHANGE UP (ref 3.8–10.5)
WBC # FLD AUTO: 4.5 K/UL — SIGNIFICANT CHANGE UP (ref 3.8–10.5)

## 2018-09-07 PROCEDURE — 99214 OFFICE O/P EST MOD 30 MIN: CPT

## 2018-09-10 DIAGNOSIS — R11.2 NAUSEA WITH VOMITING, UNSPECIFIED: ICD-10-CM

## 2018-09-10 DIAGNOSIS — Z51.11 ENCOUNTER FOR ANTINEOPLASTIC CHEMOTHERAPY: ICD-10-CM

## 2018-09-10 DIAGNOSIS — Z51.89 ENCOUNTER FOR OTHER SPECIFIED AFTERCARE: ICD-10-CM

## 2018-09-27 ENCOUNTER — FORM ENCOUNTER (OUTPATIENT)
Age: 68
End: 2018-09-27

## 2018-09-28 ENCOUNTER — APPOINTMENT (OUTPATIENT)
Dept: MRI IMAGING | Facility: CLINIC | Age: 68
End: 2018-09-28

## 2018-09-28 ENCOUNTER — OUTPATIENT (OUTPATIENT)
Dept: OUTPATIENT SERVICES | Facility: HOSPITAL | Age: 68
LOS: 1 days | End: 2018-09-28
Payer: MEDICARE

## 2018-09-28 DIAGNOSIS — C54.1 MALIGNANT NEOPLASM OF ENDOMETRIUM: ICD-10-CM

## 2018-09-28 DIAGNOSIS — Z98.42 CATARACT EXTRACTION STATUS, LEFT EYE: Chronic | ICD-10-CM

## 2018-09-28 DIAGNOSIS — Z96.653 PRESENCE OF ARTIFICIAL KNEE JOINT, BILATERAL: Chronic | ICD-10-CM

## 2018-09-28 DIAGNOSIS — Z96.649 PRESENCE OF UNSPECIFIED ARTIFICIAL HIP JOINT: Chronic | ICD-10-CM

## 2018-09-28 PROCEDURE — 72197 MRI PELVIS W/O & W/DYE: CPT | Mod: 26

## 2018-10-02 ENCOUNTER — OUTPATIENT (OUTPATIENT)
Dept: OUTPATIENT SERVICES | Facility: HOSPITAL | Age: 68
LOS: 1 days | Discharge: HOME | End: 2018-10-02

## 2018-10-02 DIAGNOSIS — Z96.649 PRESENCE OF UNSPECIFIED ARTIFICIAL HIP JOINT: Chronic | ICD-10-CM

## 2018-10-02 DIAGNOSIS — Z98.42 CATARACT EXTRACTION STATUS, LEFT EYE: Chronic | ICD-10-CM

## 2018-10-02 DIAGNOSIS — C54.1 MALIGNANT NEOPLASM OF ENDOMETRIUM: ICD-10-CM

## 2018-10-02 DIAGNOSIS — Z96.653 PRESENCE OF ARTIFICIAL KNEE JOINT, BILATERAL: Chronic | ICD-10-CM

## 2018-10-03 ENCOUNTER — OUTPATIENT (OUTPATIENT)
Dept: OUTPATIENT SERVICES | Facility: HOSPITAL | Age: 68
LOS: 1 days | Discharge: HOME | End: 2018-10-03
Payer: MEDICARE

## 2018-10-03 ENCOUNTER — APPOINTMENT (OUTPATIENT)
Dept: RADIATION ONCOLOGY | Facility: CLINIC | Age: 68
End: 2018-10-03
Payer: MEDICARE

## 2018-10-03 VITALS
SYSTOLIC BLOOD PRESSURE: 150 MMHG | OXYGEN SATURATION: 97 % | WEIGHT: 250 LBS | RESPIRATION RATE: 16 BRPM | HEART RATE: 120 BPM | DIASTOLIC BLOOD PRESSURE: 98 MMHG | HEIGHT: 62 IN | TEMPERATURE: 98.5 F | BODY MASS INDEX: 46.01 KG/M2

## 2018-10-03 DIAGNOSIS — Z98.42 CATARACT EXTRACTION STATUS, LEFT EYE: Chronic | ICD-10-CM

## 2018-10-03 DIAGNOSIS — Z96.653 PRESENCE OF ARTIFICIAL KNEE JOINT, BILATERAL: Chronic | ICD-10-CM

## 2018-10-03 DIAGNOSIS — Z96.649 PRESENCE OF UNSPECIFIED ARTIFICIAL HIP JOINT: Chronic | ICD-10-CM

## 2018-10-03 PROCEDURE — 99215 OFFICE O/P EST HI 40 MIN: CPT | Mod: 25

## 2018-10-03 PROCEDURE — 77334 RADIATION TREATMENT AID(S): CPT | Mod: 26

## 2018-10-03 PROCEDURE — 77263 THER RADIOLOGY TX PLNG CPLX: CPT

## 2018-10-03 PROCEDURE — 77290 THER RAD SIMULAJ FIELD CPLX: CPT | Mod: 26

## 2018-10-03 RX ORDER — LIDOCAINE AND PRILOCAINE 25; 25 MG/G; MG/G
2.5-2.5 CREAM TOPICAL
Qty: 5 | Refills: 2 | Status: DISCONTINUED | COMMUNITY
Start: 2018-06-01 | End: 2018-10-03

## 2018-10-03 RX ORDER — ACETAMINOPHEN AND CODEINE 300; 30 MG/1; MG/1
300-30 TABLET ORAL
Qty: 30 | Refills: 0 | Status: DISCONTINUED | COMMUNITY
Start: 2018-05-15 | End: 2018-10-03

## 2018-10-03 NOTE — DISEASE MANAGEMENT
[Clinical] : TNM Stage: c [IIIB] : IIIB [FreeTextEntry4] : recurrent endometrial adenocarcinoma [TTNM] : 3a [NTNM] : x [MTNM] : 0

## 2018-10-03 NOTE — REASON FOR VISIT
[Patient Declined  Services] : - None: Patient declined  services [Re-evaluation] : re-evaluation for [Endometrial Cancer] : endometrial cancer [Family Member] : family member [FreeTextEntry3] : daughter translated at patients request

## 2018-10-03 NOTE — PHYSICAL EXAM
[Normal External Genitalia] : normal external genitalia  [No Spine Tenderness] : no tenderness to palpation of the vertebral spine [Normal] : normal skin color and pigmentation and no rash [de-identified] : Examination performed in the presence of a female chaperone.  Dried blood noted in vaginal vault, with residual mass noted at vaginal apex.

## 2018-10-03 NOTE — HISTORY OF PRESENT ILLNESS
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 She initially underwent primary pelvic external beam radiation to 50.4 Gy completing 10/2015, with recurrence in 6/2017 for which she underwent RaTLH/BSO in 10/2017, and developed recurrence in the vaginal cuff in 1/2018.\par \par She consulted with Dr. Stanton who prescribed Tamoxifen for 3 weeks alternating with Megace for 3 weeks. She is visited [facility] for 5 weeks and during that time she could not tolerate the Tamoxifen which made her vaginal bleeding increase & she developed urinary retention. MRI pelvis performed 4/16/18 revealed enhancing, diffusion restricted mass exophytic from the vaginal cuff with possible calcifications contiguous to, and possibly invading, posterior bladder wall. Mass is also contiguous to the rectum and measures 6.1 x 4.3 x 6.2 cm. \par \par She underwent carboplatin/taxol chemotherapy with Dr. Stanton, with C1 beginning 5/11/18.  She reports tolerating chemotherapy well, and has completed 6 cycles, last of which was 9/7/18. Interval restaging CT c/a/p on 8/7/18 revealed no evidence of recurrent or metastatic disease in the thorax, abdomen or pelvis.\par \par MRI pelvis was performed 9/28/18, and showed resolution or near resolution of vaginal cuff mass with now only mild thickening in the left vaginal cuff, possibly fibrosis. 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  Denies vaginal itch, change in bowel, change in urination, pelvic or abdominal pain, or weight loss.

## 2018-10-03 NOTE — REVIEW OF SYSTEMS
[Negative] : Allergic/Immunologic [Constipation: Grade 0] : Constipation: Grade 0 [Diarrhea: Grade 0] : Diarrhea: Grade 0 [Edema Limbs: Grade 1-  5 - 10% inter-limb discrepancy in volume or circumference at point of greatest visible difference; swelling or obscuration of anatomic architecture on close inspection] : Edema Limbs: Grade 1-  5 - 10% inter-limb discrepancy in volume or circumference at point of greatest visible difference; swelling or obscuration of anatomic architecture on close inspection [Fatigue: Grade 1 - Fatigue relieved by rest] : Fatigue: Grade 1 - Fatigue relieved by rest [Localized Edema: Grade 1 - Localized to dependent areas, no disability or functional impairment] : Localized Edema: Grade 1 - Localized to dependent areas, no disability or functional impairment [Neck Edema: Grade 0] : Neck Edema: Grade 0 [Urinary Incontinence: Grade 0] : Urinary Incontinence: Grade 0  [Dyspareunia: Grade 0] : Dyspareunia: Grade 0 [de-identified] : neuropathy feet [FreeTextEntry1] : feet bilaterally  [FreeTextEntry8] : not sexually active

## 2018-10-08 ENCOUNTER — APPOINTMENT (OUTPATIENT)
Dept: HEMATOLOGY ONCOLOGY | Facility: CLINIC | Age: 68
End: 2018-10-08
Payer: MEDICARE

## 2018-10-08 ENCOUNTER — RESULT REVIEW (OUTPATIENT)
Age: 68
End: 2018-10-08

## 2018-10-08 VITALS
HEIGHT: 62 IN | OXYGEN SATURATION: 94 % | WEIGHT: 250.66 LBS | DIASTOLIC BLOOD PRESSURE: 69 MMHG | SYSTOLIC BLOOD PRESSURE: 171 MMHG | BODY MASS INDEX: 46.13 KG/M2 | TEMPERATURE: 98.4 F | HEART RATE: 121 BPM

## 2018-10-08 LAB
BASOPHILS # BLD AUTO: 0.1 K/UL — SIGNIFICANT CHANGE UP (ref 0–0.2)
BASOPHILS NFR BLD AUTO: 1 % — SIGNIFICANT CHANGE UP (ref 0–2)
EOSINOPHIL # BLD AUTO: 0.2 K/UL — SIGNIFICANT CHANGE UP (ref 0–0.5)
EOSINOPHIL NFR BLD AUTO: 3.1 % — SIGNIFICANT CHANGE UP (ref 0–6)
HCT VFR BLD CALC: 29.5 % — LOW (ref 34.5–45)
HGB BLD-MCNC: 9.2 G/DL — LOW (ref 11.5–15.5)
LYMPHOCYTES # BLD AUTO: 1.9 K/UL — SIGNIFICANT CHANGE UP (ref 1–3.3)
LYMPHOCYTES # BLD AUTO: 36 % — SIGNIFICANT CHANGE UP (ref 13–44)
MCHC RBC-ENTMCNC: 30.1 PG — SIGNIFICANT CHANGE UP (ref 27–34)
MCHC RBC-ENTMCNC: 31.2 GM/DL — LOW (ref 32–36)
MCV RBC AUTO: 96.3 FL — SIGNIFICANT CHANGE UP (ref 80–100)
MONOCYTES # BLD AUTO: 0.6 K/UL — SIGNIFICANT CHANGE UP (ref 0–0.9)
MONOCYTES NFR BLD AUTO: 11.7 % — SIGNIFICANT CHANGE UP (ref 2–14)
NEUTROPHILS # BLD AUTO: 2.5 K/UL — SIGNIFICANT CHANGE UP (ref 1.8–7.4)
NEUTROPHILS NFR BLD AUTO: 48.1 % — SIGNIFICANT CHANGE UP (ref 43–77)
PLATELET # BLD AUTO: 220 K/UL — SIGNIFICANT CHANGE UP (ref 150–400)
RBC # BLD: 3.07 M/UL — LOW (ref 3.8–5.2)
RBC # FLD: 14.8 % — HIGH (ref 10.3–14.5)
WBC # BLD: 5.3 K/UL — SIGNIFICANT CHANGE UP (ref 3.8–10.5)
WBC # FLD AUTO: 5.3 K/UL — SIGNIFICANT CHANGE UP (ref 3.8–10.5)

## 2018-10-08 PROCEDURE — 99214 OFFICE O/P EST MOD 30 MIN: CPT

## 2018-10-16 ENCOUNTER — RESULT REVIEW (OUTPATIENT)
Age: 68
End: 2018-10-16

## 2018-10-16 ENCOUNTER — APPOINTMENT (OUTPATIENT)
Age: 68
End: 2018-10-16

## 2018-10-16 LAB
BASOPHILS # BLD AUTO: 0.1 K/UL — SIGNIFICANT CHANGE UP (ref 0–0.2)
BASOPHILS NFR BLD AUTO: 1.4 % — SIGNIFICANT CHANGE UP (ref 0–2)
EOSINOPHIL # BLD AUTO: 0.2 K/UL — SIGNIFICANT CHANGE UP (ref 0–0.5)
EOSINOPHIL NFR BLD AUTO: 3.5 % — SIGNIFICANT CHANGE UP (ref 0–6)
HCT VFR BLD CALC: 30.8 % — LOW (ref 34.5–45)
HGB BLD-MCNC: 9.6 G/DL — LOW (ref 11.5–15.5)
LYMPHOCYTES # BLD AUTO: 1.3 K/UL — SIGNIFICANT CHANGE UP (ref 1–3.3)
LYMPHOCYTES # BLD AUTO: 29.1 % — SIGNIFICANT CHANGE UP (ref 13–44)
MCHC RBC-ENTMCNC: 29.9 PG — SIGNIFICANT CHANGE UP (ref 27–34)
MCHC RBC-ENTMCNC: 31.2 GM/DL — LOW (ref 32–36)
MCV RBC AUTO: 95.8 FL — SIGNIFICANT CHANGE UP (ref 80–100)
MONOCYTES # BLD AUTO: 0.5 K/UL — SIGNIFICANT CHANGE UP (ref 0–0.9)
MONOCYTES NFR BLD AUTO: 10.8 % — SIGNIFICANT CHANGE UP (ref 2–14)
NEUTROPHILS # BLD AUTO: 2.4 K/UL — SIGNIFICANT CHANGE UP (ref 1.8–7.4)
NEUTROPHILS NFR BLD AUTO: 55.2 % — SIGNIFICANT CHANGE UP (ref 43–77)
PLATELET # BLD AUTO: 161 K/UL — SIGNIFICANT CHANGE UP (ref 150–400)
RBC # BLD: 3.22 M/UL — LOW (ref 3.8–5.2)
RBC # FLD: 14.1 % — SIGNIFICANT CHANGE UP (ref 10.3–14.5)
WBC # BLD: 4.3 K/UL — SIGNIFICANT CHANGE UP (ref 3.8–10.5)
WBC # FLD AUTO: 4.3 K/UL — SIGNIFICANT CHANGE UP (ref 3.8–10.5)

## 2018-10-17 DIAGNOSIS — D64.81 ANEMIA DUE TO ANTINEOPLASTIC CHEMOTHERAPY: ICD-10-CM

## 2018-10-17 DIAGNOSIS — G62.0 DRUG-INDUCED POLYNEUROPATHY: ICD-10-CM

## 2018-11-09 ENCOUNTER — APPOINTMENT (OUTPATIENT)
Dept: RADIATION ONCOLOGY | Facility: CLINIC | Age: 68
End: 2018-11-09
Payer: MEDICARE

## 2018-11-09 ENCOUNTER — OUTPATIENT (OUTPATIENT)
Dept: OUTPATIENT SERVICES | Facility: HOSPITAL | Age: 68
LOS: 1 days | Discharge: HOME | End: 2018-11-09
Payer: MEDICARE

## 2018-11-09 DIAGNOSIS — Z98.42 CATARACT EXTRACTION STATUS, LEFT EYE: Chronic | ICD-10-CM

## 2018-11-09 DIAGNOSIS — Z96.649 PRESENCE OF UNSPECIFIED ARTIFICIAL HIP JOINT: Chronic | ICD-10-CM

## 2018-11-09 DIAGNOSIS — Z96.653 PRESENCE OF ARTIFICIAL KNEE JOINT, BILATERAL: Chronic | ICD-10-CM

## 2018-11-09 PROCEDURE — 77290 THER RAD SIMULAJ FIELD CPLX: CPT | Mod: 26

## 2018-11-09 PROCEDURE — 99215 OFFICE O/P EST HI 40 MIN: CPT | Mod: 25

## 2018-11-09 PROCEDURE — 77334 RADIATION TREATMENT AID(S): CPT | Mod: 26

## 2018-11-09 NOTE — HISTORY OF PRESENT ILLNESS
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 She initially underwent primary pelvic external beam radiation to 50.4 Gy completing 10/2015, with recurrence in 6/2017 for which she underwent RaTLH/BSO in 10/2017, and developed recurrence in the vaginal cuff in 1/2018.\par \par She consulted with Dr. Stanton who prescribed Tamoxifen for 3 weeks alternating with Megace for 3 weeks. She is visited [facility] for 5 weeks and during that time she could not tolerate the Tamoxifen which made her vaginal bleeding increase & she developed urinary retention. MRI pelvis performed 4/16/18 revealed enhancing, diffusion restricted mass exophytic from the vaginal cuff with possible calcifications contiguous to, and possibly invading, posterior bladder wall. Mass is also contiguous to the rectum and measures 6.1 x 4.3 x 6.2 cm. \par \par She underwent carboplatin/taxol chemotherapy with Dr. Stanton, with C1 beginning 5/11/18.  She reports tolerating chemotherapy well, and has completed 6 cycles, last of which was 9/7/18. Interval restaging CT c/a/p on 8/7/18 revealed no evidence of recurrent or metastatic disease in the thorax, abdomen or pelvis.\par \par MRI pelvis was performed 9/28/18, and showed resolution or near resolution of vaginal cuff mass with now only mild thickening in the left vaginal cuff, possibly fibrosis. \par \par Interval history: \par Patient returned today for initiation of intracavitary brachytherapy.  However, reports vaginal bleeding requiring 2-3 pads per day, progressive since last visit.  Notes stable bilateral pedal edema with neuropathy since chemotherapy.  Stable fatigue.  Denies vaginal itch, change in bowel, change in urination, pelvic or abdominal pain, or weight loss.

## 2018-11-09 NOTE — REVIEW OF SYSTEMS
[Urinary Frequency] : no change in urinary frequency [Nocturia] : no nocturia [de-identified] : neuropathy feet [FreeTextEntry1] : feet bilaterally  [FreeTextEntry8] : not sexually active

## 2018-11-09 NOTE — PHYSICAL EXAM
[Tenderness] : no tenderness [de-identified] : Examination performed in the presence of a female chaperone.  Friable tissue palpable at proximal vagina/vaginal apex; speculum visualization difficult secondary to bleeding.

## 2018-11-16 ENCOUNTER — OUTPATIENT (OUTPATIENT)
Dept: OUTPATIENT SERVICES | Facility: HOSPITAL | Age: 68
LOS: 1 days | End: 2018-11-16

## 2018-11-16 ENCOUNTER — APPOINTMENT (OUTPATIENT)
Dept: MRI IMAGING | Facility: CLINIC | Age: 68
End: 2018-11-16
Payer: MEDICARE

## 2018-11-16 ENCOUNTER — OUTPATIENT (OUTPATIENT)
Dept: OUTPATIENT SERVICES | Facility: HOSPITAL | Age: 68
LOS: 1 days | Discharge: HOME | End: 2018-11-16

## 2018-11-16 DIAGNOSIS — D50.0 IRON DEFICIENCY ANEMIA SECONDARY TO BLOOD LOSS (CHRONIC): ICD-10-CM

## 2018-11-16 DIAGNOSIS — Z96.653 PRESENCE OF ARTIFICIAL KNEE JOINT, BILATERAL: Chronic | ICD-10-CM

## 2018-11-16 DIAGNOSIS — Z96.649 PRESENCE OF UNSPECIFIED ARTIFICIAL HIP JOINT: Chronic | ICD-10-CM

## 2018-11-16 DIAGNOSIS — Z98.42 CATARACT EXTRACTION STATUS, LEFT EYE: Chronic | ICD-10-CM

## 2018-11-16 DIAGNOSIS — C54.1 MALIGNANT NEOPLASM OF ENDOMETRIUM: ICD-10-CM

## 2018-11-16 PROCEDURE — 72197 MRI PELVIS W/O & W/DYE: CPT | Mod: 26

## 2018-11-16 PROCEDURE — 77334 RADIATION TREATMENT AID(S): CPT | Mod: 26

## 2018-11-16 PROCEDURE — 77290 THER RAD SIMULAJ FIELD CPLX: CPT | Mod: 26

## 2018-11-20 ENCOUNTER — OTHER (OUTPATIENT)
Age: 68
End: 2018-11-20

## 2018-11-27 ENCOUNTER — APPOINTMENT (OUTPATIENT)
Age: 68
End: 2018-11-27

## 2018-12-05 PROCEDURE — 77300 RADIATION THERAPY DOSE PLAN: CPT | Mod: 26

## 2018-12-05 PROCEDURE — 77334 RADIATION TREATMENT AID(S): CPT | Mod: 26

## 2018-12-05 PROCEDURE — 77295 3-D RADIOTHERAPY PLAN: CPT | Mod: 26

## 2018-12-06 ENCOUNTER — RESULT REVIEW (OUTPATIENT)
Age: 68
End: 2018-12-06

## 2018-12-06 ENCOUNTER — APPOINTMENT (OUTPATIENT)
Age: 68
End: 2018-12-06

## 2018-12-06 LAB
BASOPHILS # BLD AUTO: 0.1 K/UL — SIGNIFICANT CHANGE UP (ref 0–0.2)
BASOPHILS NFR BLD AUTO: 0.7 % — SIGNIFICANT CHANGE UP (ref 0–2)
EOSINOPHIL # BLD AUTO: 0.2 K/UL — SIGNIFICANT CHANGE UP (ref 0–0.5)
EOSINOPHIL NFR BLD AUTO: 2.2 % — SIGNIFICANT CHANGE UP (ref 0–6)
HCT VFR BLD CALC: 34 % — LOW (ref 34.5–45)
HGB BLD-MCNC: 10.4 G/DL — LOW (ref 11.5–15.5)
LYMPHOCYTES # BLD AUTO: 2.1 K/UL — SIGNIFICANT CHANGE UP (ref 1–3.3)
LYMPHOCYTES # BLD AUTO: 26.8 % — SIGNIFICANT CHANGE UP (ref 13–44)
MCHC RBC-ENTMCNC: 28.2 PG — SIGNIFICANT CHANGE UP (ref 27–34)
MCHC RBC-ENTMCNC: 30.6 GM/DL — LOW (ref 32–36)
MCV RBC AUTO: 92.2 FL — SIGNIFICANT CHANGE UP (ref 80–100)
MONOCYTES # BLD AUTO: 0.6 K/UL — SIGNIFICANT CHANGE UP (ref 0–0.9)
MONOCYTES NFR BLD AUTO: 8.1 % — SIGNIFICANT CHANGE UP (ref 2–14)
NEUTROPHILS # BLD AUTO: 4.9 K/UL — SIGNIFICANT CHANGE UP (ref 1.8–7.4)
NEUTROPHILS NFR BLD AUTO: 62.2 % — SIGNIFICANT CHANGE UP (ref 43–77)
PLATELET # BLD AUTO: 179 K/UL — SIGNIFICANT CHANGE UP (ref 150–400)
RBC # BLD: 3.69 M/UL — LOW (ref 3.8–5.2)
RBC # FLD: 12.9 % — SIGNIFICANT CHANGE UP (ref 10.3–14.5)
WBC # BLD: 7.9 K/UL — SIGNIFICANT CHANGE UP (ref 3.8–10.5)
WBC # FLD AUTO: 7.9 K/UL — SIGNIFICANT CHANGE UP (ref 3.8–10.5)

## 2018-12-12 VITALS
TEMPERATURE: 98.5 F | WEIGHT: 247 LBS | SYSTOLIC BLOOD PRESSURE: 128 MMHG | HEIGHT: 62 IN | OXYGEN SATURATION: 96 % | RESPIRATION RATE: 16 BRPM | DIASTOLIC BLOOD PRESSURE: 76 MMHG | HEART RATE: 100 BPM | BODY MASS INDEX: 45.45 KG/M2

## 2018-12-12 NOTE — PHYSICAL EXAM
[Normal] : no focal deficits [de-identified] : watery vaginal discharge with some pink tinges. increased odor

## 2018-12-12 NOTE — REASON FOR VISIT
[Routine On-Treatment] : a routine on-treatment visit for [Endometrial Cancer] : endometrial cancer [Family Member] : family member

## 2018-12-12 NOTE — REVIEW OF SYSTEMS
[Constipation: Grade 0] : Constipation: Grade 0 [Diarrhea: Grade 0] : Diarrhea: Grade 0 [Edema Limbs: Grade 0] : Edema Limbs: Grade 0  [Fatigue: Grade 0] : Fatigue: Grade 0 [Localized Edema: Grade 0] : Localized Edema: Grade 0  [Urinary Incontinence: Grade 0] : Urinary Incontinence: Grade 0

## 2018-12-12 NOTE — DISEASE MANAGEMENT
[Pathological] : TNM Stage: p [IIIA] : IIIA [FreeTextEntry4] : recurrent adenocarcinoma of the endometrium [TTNM] : 3a [NTNM] : x [MTNM] : 0 [de-identified] : 4659 [de-identified] : 0882 [de-identified] : vaginal cuff tumor

## 2018-12-14 PROCEDURE — 77435 SBRT MANAGEMENT: CPT

## 2018-12-21 ENCOUNTER — OUTPATIENT (OUTPATIENT)
Dept: OUTPATIENT SERVICES | Facility: HOSPITAL | Age: 68
LOS: 1 days | Discharge: HOME | End: 2018-12-21

## 2018-12-21 DIAGNOSIS — C54.1 MALIGNANT NEOPLASM OF ENDOMETRIUM: ICD-10-CM

## 2018-12-21 DIAGNOSIS — Z96.653 PRESENCE OF ARTIFICIAL KNEE JOINT, BILATERAL: Chronic | ICD-10-CM

## 2018-12-21 DIAGNOSIS — D50.0 IRON DEFICIENCY ANEMIA SECONDARY TO BLOOD LOSS (CHRONIC): ICD-10-CM

## 2018-12-21 DIAGNOSIS — Z96.649 PRESENCE OF UNSPECIFIED ARTIFICIAL HIP JOINT: Chronic | ICD-10-CM

## 2018-12-21 DIAGNOSIS — Z98.42 CATARACT EXTRACTION STATUS, LEFT EYE: Chronic | ICD-10-CM

## 2018-12-21 DIAGNOSIS — T45.1X5A ADVERSE EFFECT OF ANTINEOPLASTIC AND IMMUNOSUPPRESSIVE DRUGS, INITIAL ENCOUNTER: ICD-10-CM

## 2018-12-21 DIAGNOSIS — D64.81 ANEMIA DUE TO ANTINEOPLASTIC CHEMOTHERAPY: ICD-10-CM

## 2018-12-26 NOTE — H&P PST ADULT - AS BP NONINV METHOD
Patient is a 64y old  Male who presents with a chief complaint of persistent nausea/ vomiting and inability to tolerate oral intake for the past 3 weeks.  HPI: 64y Male presents with a chief complaint of persistent N/V. The patient has a significant past medical history of Gastric Ca    REVIEW OF SYSTEMS  Constitutional:   + No fever, + fatigue, no pallor, no night sweats, +weight loss.  HEENT:   No eye pain, no vision changes, no icterus, no mouth ulcers.  Respiratory:   No shortness of breath, no cough, no respiratory distress.   Cardiovascular:   No chest pain, no palpitations.   Gastrointestinal: mild epigastric abdominal pain, + nausea, vomiting , no diarrhea,  +constipation, no hematochezia, no melena.  Skin:   No rashes, no jaundice, no eczema.   Musculoskeletal:   No joint pain, no swelling, no myalgia.   Neurologic:   No headache, no seizure, no weakness.   Genitourinary:   No dysuria, no decreased urine output.  Psychiatric:  No depression, no anxiety,   Endocrine:   No thyroid disease, no diabetes.  Heme/Lymphatic:   No anemia, no blood transfusions, no lymph node enlargement, no bleeding, no bruising.  ___________________________________________________________________________________________  Allergies    No Known Allergies    Intolerances      MEDICATIONS  (STANDING):  dextrose 5% + sodium chloride 0.9%. 1000 milliLiter(s) (100 mL/Hr) IV Continuous <Continuous>  heparin  Injectable 5000 Unit(s) SubCutaneous every 8 hours  pantoprazole  Injectable 40 milliGRAM(s) IV Push daily    MEDICATIONS  (PRN):      PAST MEDICAL & SURGICAL HISTORY:  Stomach cancer  S/P total gastrectomy and Kelby-en-Y esophagojejunal anastomosis    FAMILY HISTORY:  No pertinent family history in first degree relatives    Social History: No history of : Tobacco use, IVDA, EToH  ______________________________________________________________________________________    PHYSICAL EXAM    Daily     Daily   BMI: 19.4 (12-24 @ 14:46)  Change in Weight:  Vital Signs Last 24 Hrs  T(C): 36.4 (26 Dec 2018 06:01), Max: 37.1 (25 Dec 2018 14:44)  T(F): 97.6 (26 Dec 2018 06:01), Max: 98.8 (25 Dec 2018 14:44)  HR: 50 (26 Dec 2018 06:01) (50 - 57)  BP: 115/71 (26 Dec 2018 06:01) (115/71 - 125/65)  BP(mean): --  RR: 16 (26 Dec 2018 06:01) (16 - 16)  SpO2: 100% (26 Dec 2018 06:01) (99% - 100%)    General:  NAD, alert and active, no pallor  HEENT:    Normal appearance of conjunctiva, ears, nose, lips, oropharynx, and oral mucosa, anicteric.  Neck:  No masses, no asymmetry.  Lymph Nodes:  No lymphadenopathy.   Cardiovascular:  RRR normal S1/S2, no murmur.  Respiratory:  CTA B/L, normal respiratory effort.   Abdominal:   soft, no masses or tenderness, normoactive BS, NT/ND, no HSM, no rebound, no guarding.  Extremities:   No clubbing or cyanosis, normal capillary refill, no edema.   Skin:   No rash, jaundice, lesions, eczema.   Musculoskeletal:  No joint swelling, erythema or tenderness.   Neuro: No focal deficits.   Other:   _______________________________________________________________________________________________  Lab Results:                          16.7   6.1   )-----------( 128      ( 24 Dec 2018 15:42 )             48.6     12-24    146<H>  |  105  |  48<H>  ----------------------------<  119<H>  3.9   |  30  |  1.60<H>    Ca    10.0      24 Dec 2018 15:42    TPro  9.2<H>  /  Alb  4.6  /  TBili  1.1  /  DBili  x   /  AST  42<H>  /  ALT  36  /  AlkPhos  85  12-24    LIVER FUNCTIONS - ( 24 Dec 2018 15:42 )  Alb: 4.6 g/dL / Pro: 9.2 g/dL / ALK PHOS: 85 U/L / ALT: 36 U/L DA / AST: 42 U/L / GGT: x                   Stool Results:          RADIOLOGY RESULTS:  CT Abd/Pelvis - Mild abdominal pelvic complex ascites of indeterminate etiology as   described. Remaining abdominal pelvic viscera unremarkable.      SURGICAL PATHOLOGY: auscultated w/ stethoscope

## 2019-01-01 ENCOUNTER — MESSAGE (OUTPATIENT)
Age: 69
End: 2019-01-01

## 2019-01-01 ENCOUNTER — APPOINTMENT (OUTPATIENT)
Dept: UROLOGY | Facility: CLINIC | Age: 69
End: 2019-01-01
Payer: MEDICARE

## 2019-01-01 ENCOUNTER — RESULT REVIEW (OUTPATIENT)
Age: 69
End: 2019-01-01

## 2019-01-01 ENCOUNTER — APPOINTMENT (OUTPATIENT)
Dept: HEMATOLOGY ONCOLOGY | Facility: CLINIC | Age: 69
End: 2019-01-01
Payer: MEDICARE

## 2019-01-01 ENCOUNTER — APPOINTMENT (OUTPATIENT)
Age: 69
End: 2019-01-01

## 2019-01-01 ENCOUNTER — LABORATORY RESULT (OUTPATIENT)
Age: 69
End: 2019-01-01

## 2019-01-01 ENCOUNTER — APPOINTMENT (OUTPATIENT)
Dept: HEMATOLOGY ONCOLOGY | Facility: CLINIC | Age: 69
End: 2019-01-01

## 2019-01-01 ENCOUNTER — OUTPATIENT (OUTPATIENT)
Dept: OUTPATIENT SERVICES | Facility: HOSPITAL | Age: 69
LOS: 1 days | Discharge: HOME | End: 2019-01-01

## 2019-01-01 ENCOUNTER — FORM ENCOUNTER (OUTPATIENT)
Age: 69
End: 2019-01-01

## 2019-01-01 ENCOUNTER — OUTPATIENT (OUTPATIENT)
Dept: OUTPATIENT SERVICES | Facility: HOSPITAL | Age: 69
LOS: 1 days | End: 2019-01-01
Payer: MEDICARE

## 2019-01-01 ENCOUNTER — OTHER (OUTPATIENT)
Age: 69
End: 2019-01-01

## 2019-01-01 ENCOUNTER — APPOINTMENT (OUTPATIENT)
Dept: GYNECOLOGIC ONCOLOGY | Facility: CLINIC | Age: 69
End: 2019-01-01
Payer: MEDICARE

## 2019-01-01 ENCOUNTER — RX RENEWAL (OUTPATIENT)
Age: 69
End: 2019-01-01

## 2019-01-01 ENCOUNTER — APPOINTMENT (OUTPATIENT)
Dept: CT IMAGING | Facility: CLINIC | Age: 69
End: 2019-01-01
Payer: MEDICARE

## 2019-01-01 VITALS
SYSTOLIC BLOOD PRESSURE: 164 MMHG | HEIGHT: 62 IN | HEART RATE: 132 BPM | WEIGHT: 234 LBS | BODY MASS INDEX: 43.06 KG/M2 | DIASTOLIC BLOOD PRESSURE: 85 MMHG

## 2019-01-01 VITALS
SYSTOLIC BLOOD PRESSURE: 145 MMHG | OXYGEN SATURATION: 95 % | TEMPERATURE: 99.2 F | BODY MASS INDEX: 43.26 KG/M2 | DIASTOLIC BLOOD PRESSURE: 83 MMHG | WEIGHT: 235.05 LBS | HEIGHT: 62 IN | HEART RATE: 105 BPM

## 2019-01-01 VITALS
HEART RATE: 114 BPM | WEIGHT: 236 LBS | SYSTOLIC BLOOD PRESSURE: 154 MMHG | RESPIRATION RATE: 14 BRPM | HEIGHT: 62 IN | BODY MASS INDEX: 43.43 KG/M2 | DIASTOLIC BLOOD PRESSURE: 73 MMHG

## 2019-01-01 VITALS
TEMPERATURE: 99.1 F | HEIGHT: 62 IN | DIASTOLIC BLOOD PRESSURE: 90 MMHG | SYSTOLIC BLOOD PRESSURE: 167 MMHG | HEART RATE: 80 BPM | BODY MASS INDEX: 43.43 KG/M2 | WEIGHT: 236 LBS

## 2019-01-01 VITALS
SYSTOLIC BLOOD PRESSURE: 141 MMHG | BODY MASS INDEX: 41.96 KG/M2 | TEMPERATURE: 98.5 F | HEIGHT: 62 IN | WEIGHT: 228 LBS | OXYGEN SATURATION: 99 % | DIASTOLIC BLOOD PRESSURE: 81 MMHG | HEART RATE: 101 BPM

## 2019-01-01 VITALS
SYSTOLIC BLOOD PRESSURE: 142 MMHG | HEART RATE: 103 BPM | BODY MASS INDEX: 41.41 KG/M2 | OXYGEN SATURATION: 98 % | TEMPERATURE: 98.5 F | DIASTOLIC BLOOD PRESSURE: 81 MMHG | WEIGHT: 225.02 LBS | HEIGHT: 62 IN

## 2019-01-01 VITALS
WEIGHT: 225 LBS | HEART RATE: 125 BPM | DIASTOLIC BLOOD PRESSURE: 83 MMHG | TEMPERATURE: 99.1 F | SYSTOLIC BLOOD PRESSURE: 168 MMHG | BODY MASS INDEX: 41.15 KG/M2

## 2019-01-01 VITALS
SYSTOLIC BLOOD PRESSURE: 147 MMHG | HEART RATE: 119 BPM | HEIGHT: 62 IN | OXYGEN SATURATION: 94 % | DIASTOLIC BLOOD PRESSURE: 82 MMHG | WEIGHT: 234.04 LBS | TEMPERATURE: 98.2 F | BODY MASS INDEX: 43.07 KG/M2

## 2019-01-01 VITALS — DIASTOLIC BLOOD PRESSURE: 89 MMHG | SYSTOLIC BLOOD PRESSURE: 152 MMHG | RESPIRATION RATE: 20 BRPM | HEART RATE: 124 BPM

## 2019-01-01 DIAGNOSIS — Z98.42 CATARACT EXTRACTION STATUS, LEFT EYE: Chronic | ICD-10-CM

## 2019-01-01 DIAGNOSIS — Z96.649 PRESENCE OF UNSPECIFIED ARTIFICIAL HIP JOINT: Chronic | ICD-10-CM

## 2019-01-01 DIAGNOSIS — Z96.653 PRESENCE OF ARTIFICIAL KNEE JOINT, BILATERAL: Chronic | ICD-10-CM

## 2019-01-01 DIAGNOSIS — R39.9 UNSPECIFIED SYMPTOMS AND SIGNS INVOLVING THE GENITOURINARY SYSTEM: ICD-10-CM

## 2019-01-01 DIAGNOSIS — Z51.11 ENCOUNTER FOR ANTINEOPLASTIC CHEMOTHERAPY: ICD-10-CM

## 2019-01-01 DIAGNOSIS — D50.0 IRON DEFICIENCY ANEMIA SECONDARY TO BLOOD LOSS (CHRONIC): ICD-10-CM

## 2019-01-01 DIAGNOSIS — C54.1 MALIGNANT NEOPLASM OF ENDOMETRIUM: ICD-10-CM

## 2019-01-01 DIAGNOSIS — R11.2 NAUSEA WITH VOMITING, UNSPECIFIED: ICD-10-CM

## 2019-01-01 DIAGNOSIS — N95.0 POSTMENOPAUSAL BLEEDING: ICD-10-CM

## 2019-01-01 DIAGNOSIS — D64.81 ANEMIA DUE TO ANTINEOPLASTIC CHEMOTHERAPY: ICD-10-CM

## 2019-01-01 DIAGNOSIS — R31.0 GROSS HEMATURIA: ICD-10-CM

## 2019-01-01 DIAGNOSIS — Z92.3 PERSONAL HISTORY OF IRRADIATION: ICD-10-CM

## 2019-01-01 DIAGNOSIS — E66.9 OBESITY, UNSPECIFIED: ICD-10-CM

## 2019-01-01 LAB
ALBUMIN SERPL ELPH-MCNC: 3.3 G/DL
ALBUMIN SERPL ELPH-MCNC: 3.4 G/DL
ALP BLD-CCNC: 58 U/L
ALP BLD-CCNC: 65 U/L
ALT SERPL-CCNC: 15 U/L
ALT SERPL-CCNC: 18 U/L
ANION GAP SERPL CALC-SCNC: 13 MMOL/L
ANION GAP SERPL CALC-SCNC: 15 MMOL/L
APPEARANCE: ABNORMAL
AST SERPL-CCNC: 16 U/L
AST SERPL-CCNC: 21 U/L
BACTERIA UR CULT: NORMAL
BACTERIA: NEGATIVE
BASOPHILS # BLD AUTO: 0.1 K/UL — SIGNIFICANT CHANGE UP (ref 0–0.2)
BASOPHILS NFR BLD AUTO: 0.6 % — SIGNIFICANT CHANGE UP (ref 0–2)
BASOPHILS NFR BLD AUTO: 0.6 % — SIGNIFICANT CHANGE UP (ref 0–2)
BASOPHILS NFR BLD AUTO: 0.7 % — SIGNIFICANT CHANGE UP (ref 0–2)
BASOPHILS NFR BLD AUTO: 0.8 % — SIGNIFICANT CHANGE UP (ref 0–2)
BASOPHILS NFR BLD AUTO: 0.9 % — SIGNIFICANT CHANGE UP (ref 0–2)
BASOPHILS NFR BLD AUTO: 1.1 % — SIGNIFICANT CHANGE UP (ref 0–2)
BASOPHILS NFR BLD AUTO: 1.2 % — SIGNIFICANT CHANGE UP (ref 0–2)
BASOPHILS NFR BLD AUTO: 1.4 % — SIGNIFICANT CHANGE UP (ref 0–2)
BILIRUB SERPL-MCNC: 0.3 MG/DL
BILIRUB SERPL-MCNC: 0.4 MG/DL
BILIRUB UR QL STRIP: ABNORMAL
BILIRUBIN URINE: NEGATIVE
BLOOD URINE: ABNORMAL
BUN SERPL-MCNC: 13 MG/DL
BUN SERPL-MCNC: 14 MG/DL
CALCIUM SERPL-MCNC: 8.6 MG/DL
CALCIUM SERPL-MCNC: 8.8 MG/DL
CHLORIDE SERPL-SCNC: 101 MMOL/L
CHLORIDE SERPL-SCNC: 99 MMOL/L
CO2 SERPL-SCNC: 22 MMOL/L
CO2 SERPL-SCNC: 24 MMOL/L
COLOR: ABNORMAL
CREAT SERPL-MCNC: 0.9 MG/DL
CREAT SERPL-MCNC: 1.1 MG/DL
EOSINOPHIL # BLD AUTO: 0.1 K/UL — SIGNIFICANT CHANGE UP (ref 0–0.5)
EOSINOPHIL # BLD AUTO: 0.2 K/UL — SIGNIFICANT CHANGE UP (ref 0–0.5)
EOSINOPHIL NFR BLD AUTO: 1.1 % — SIGNIFICANT CHANGE UP (ref 0–6)
EOSINOPHIL NFR BLD AUTO: 1.3 % — SIGNIFICANT CHANGE UP (ref 0–6)
EOSINOPHIL NFR BLD AUTO: 1.6 % — SIGNIFICANT CHANGE UP (ref 0–6)
EOSINOPHIL NFR BLD AUTO: 1.6 % — SIGNIFICANT CHANGE UP (ref 0–6)
EOSINOPHIL NFR BLD AUTO: 1.8 % — SIGNIFICANT CHANGE UP (ref 0–6)
EOSINOPHIL NFR BLD AUTO: 2 % — SIGNIFICANT CHANGE UP (ref 0–6)
EOSINOPHIL NFR BLD AUTO: 2.5 % — SIGNIFICANT CHANGE UP (ref 0–6)
EOSINOPHIL NFR BLD AUTO: 2.7 % — SIGNIFICANT CHANGE UP (ref 0–6)
EOSINOPHIL NFR BLD AUTO: 2.9 % — SIGNIFICANT CHANGE UP (ref 0–6)
EOSINOPHIL NFR BLD AUTO: 3 % — SIGNIFICANT CHANGE UP (ref 0–6)
GLUCOSE QUALITATIVE U: NEGATIVE
GLUCOSE SERPL-MCNC: 107 MG/DL
GLUCOSE SERPL-MCNC: 135 MG/DL
GLUCOSE UR-MCNC: 100
HCG UR QL: 2 EU/DL
HCT VFR BLD CALC: 27.8 % — LOW (ref 34.5–45)
HCT VFR BLD CALC: 29.5 % — LOW (ref 34.5–45)
HCT VFR BLD CALC: 30 % — LOW (ref 34.5–45)
HCT VFR BLD CALC: 30.1 % — LOW (ref 34.5–45)
HCT VFR BLD CALC: 30.2 % — LOW (ref 34.5–45)
HCT VFR BLD CALC: 32.1 % — LOW (ref 34.5–45)
HCT VFR BLD CALC: 32.1 % — LOW (ref 34.5–45)
HCT VFR BLD CALC: 32.2 % — LOW (ref 34.5–45)
HCT VFR BLD CALC: 32.4 % — LOW (ref 34.5–45)
HCT VFR BLD CALC: 34.1 % — LOW (ref 34.5–45)
HGB BLD-MCNC: 10 G/DL — LOW (ref 11.5–15.5)
HGB BLD-MCNC: 10 G/DL — LOW (ref 11.5–15.5)
HGB BLD-MCNC: 10.1 G/DL — LOW (ref 11.5–15.5)
HGB BLD-MCNC: 10.1 G/DL — LOW (ref 11.5–15.5)
HGB BLD-MCNC: 10.7 G/DL — LOW (ref 11.5–15.5)
HGB BLD-MCNC: 9.2 G/DL — LOW (ref 11.5–15.5)
HGB BLD-MCNC: 9.3 G/DL — LOW (ref 11.5–15.5)
HGB BLD-MCNC: 9.4 G/DL — LOW (ref 11.5–15.5)
HGB BLD-MCNC: 9.5 G/DL — LOW (ref 11.5–15.5)
HGB BLD-MCNC: 9.8 G/DL — LOW (ref 11.5–15.5)
HGB UR QL STRIP.AUTO: ABNORMAL
HYALINE CASTS: 7 /LPF
KETONES UR-MCNC: 15
KETONES URINE: NEGATIVE
LEUKOCYTE ESTERASE UR QL STRIP: ABNORMAL
LEUKOCYTE ESTERASE URINE: ABNORMAL
LYMPHOCYTES # BLD AUTO: 1.4 K/UL — SIGNIFICANT CHANGE UP (ref 1–3.3)
LYMPHOCYTES # BLD AUTO: 1.4 K/UL — SIGNIFICANT CHANGE UP (ref 1–3.3)
LYMPHOCYTES # BLD AUTO: 1.6 K/UL — SIGNIFICANT CHANGE UP (ref 1–3.3)
LYMPHOCYTES # BLD AUTO: 1.8 K/UL — SIGNIFICANT CHANGE UP (ref 1–3.3)
LYMPHOCYTES # BLD AUTO: 1.8 K/UL — SIGNIFICANT CHANGE UP (ref 1–3.3)
LYMPHOCYTES # BLD AUTO: 1.9 K/UL — SIGNIFICANT CHANGE UP (ref 1–3.3)
LYMPHOCYTES # BLD AUTO: 1.9 K/UL — SIGNIFICANT CHANGE UP (ref 1–3.3)
LYMPHOCYTES # BLD AUTO: 14.6 % — SIGNIFICANT CHANGE UP (ref 13–44)
LYMPHOCYTES # BLD AUTO: 18.8 % — SIGNIFICANT CHANGE UP (ref 13–44)
LYMPHOCYTES # BLD AUTO: 2.1 K/UL — SIGNIFICANT CHANGE UP (ref 1–3.3)
LYMPHOCYTES # BLD AUTO: 2.1 K/UL — SIGNIFICANT CHANGE UP (ref 1–3.3)
LYMPHOCYTES # BLD AUTO: 2.5 K/UL — SIGNIFICANT CHANGE UP (ref 1–3.3)
LYMPHOCYTES # BLD AUTO: 22.3 % — SIGNIFICANT CHANGE UP (ref 13–44)
LYMPHOCYTES # BLD AUTO: 24.4 % — SIGNIFICANT CHANGE UP (ref 13–44)
LYMPHOCYTES # BLD AUTO: 25.1 % — SIGNIFICANT CHANGE UP (ref 13–44)
LYMPHOCYTES # BLD AUTO: 26.6 % — SIGNIFICANT CHANGE UP (ref 13–44)
LYMPHOCYTES # BLD AUTO: 26.8 % — SIGNIFICANT CHANGE UP (ref 13–44)
LYMPHOCYTES # BLD AUTO: 26.9 % — SIGNIFICANT CHANGE UP (ref 13–44)
LYMPHOCYTES # BLD AUTO: 32.4 % — SIGNIFICANT CHANGE UP (ref 13–44)
LYMPHOCYTES # BLD AUTO: 46.9 % — HIGH (ref 13–44)
MAGNESIUM SERPL-MCNC: 1.6 MG/DL
MAGNESIUM SERPL-MCNC: 1.9 MG/DL
MCHC RBC-ENTMCNC: 26.7 PG — LOW (ref 27–34)
MCHC RBC-ENTMCNC: 26.8 PG — LOW (ref 27–34)
MCHC RBC-ENTMCNC: 26.9 PG — LOW (ref 27–34)
MCHC RBC-ENTMCNC: 27.2 PG — SIGNIFICANT CHANGE UP (ref 27–34)
MCHC RBC-ENTMCNC: 27.4 PG — SIGNIFICANT CHANGE UP (ref 27–34)
MCHC RBC-ENTMCNC: 27.5 PG — SIGNIFICANT CHANGE UP (ref 27–34)
MCHC RBC-ENTMCNC: 27.5 PG — SIGNIFICANT CHANGE UP (ref 27–34)
MCHC RBC-ENTMCNC: 27.9 PG — SIGNIFICANT CHANGE UP (ref 27–34)
MCHC RBC-ENTMCNC: 28 PG — SIGNIFICANT CHANGE UP (ref 27–34)
MCHC RBC-ENTMCNC: 28.6 PG — SIGNIFICANT CHANGE UP (ref 27–34)
MCHC RBC-ENTMCNC: 31.1 G/DL — LOW (ref 32–36)
MCHC RBC-ENTMCNC: 31.2 G/DL — LOW (ref 32–36)
MCHC RBC-ENTMCNC: 31.3 G/DL — LOW (ref 32–36)
MCHC RBC-ENTMCNC: 31.5 G/DL — LOW (ref 32–36)
MCHC RBC-ENTMCNC: 32.5 G/DL — SIGNIFICANT CHANGE UP (ref 32–36)
MCHC RBC-ENTMCNC: 33.3 G/DL — SIGNIFICANT CHANGE UP (ref 32–36)
MCV RBC AUTO: 85.6 FL — SIGNIFICANT CHANGE UP (ref 80–100)
MCV RBC AUTO: 85.7 FL — SIGNIFICANT CHANGE UP (ref 80–100)
MCV RBC AUTO: 85.8 FL — SIGNIFICANT CHANGE UP (ref 80–100)
MCV RBC AUTO: 85.9 FL — SIGNIFICANT CHANGE UP (ref 80–100)
MCV RBC AUTO: 86.3 FL — SIGNIFICANT CHANGE UP (ref 80–100)
MCV RBC AUTO: 86.4 FL — SIGNIFICANT CHANGE UP (ref 80–100)
MCV RBC AUTO: 87.9 FL — SIGNIFICANT CHANGE UP (ref 80–100)
MCV RBC AUTO: 87.9 FL — SIGNIFICANT CHANGE UP (ref 80–100)
MCV RBC AUTO: 88.5 FL — SIGNIFICANT CHANGE UP (ref 80–100)
MCV RBC AUTO: 89.2 FL — SIGNIFICANT CHANGE UP (ref 80–100)
MICROSCOPIC-UA: NORMAL
MONOCYTES # BLD AUTO: 0.2 K/UL — SIGNIFICANT CHANGE UP (ref 0–0.9)
MONOCYTES # BLD AUTO: 0.3 K/UL — SIGNIFICANT CHANGE UP (ref 0–0.9)
MONOCYTES # BLD AUTO: 0.6 K/UL — SIGNIFICANT CHANGE UP (ref 0–0.9)
MONOCYTES # BLD AUTO: 0.7 K/UL — SIGNIFICANT CHANGE UP (ref 0–0.9)
MONOCYTES # BLD AUTO: 0.8 K/UL — SIGNIFICANT CHANGE UP (ref 0–0.9)
MONOCYTES NFR BLD AUTO: 10.4 % — SIGNIFICANT CHANGE UP (ref 2–14)
MONOCYTES NFR BLD AUTO: 11.8 % — SIGNIFICANT CHANGE UP (ref 2–14)
MONOCYTES NFR BLD AUTO: 3.8 % — SIGNIFICANT CHANGE UP (ref 2–14)
MONOCYTES NFR BLD AUTO: 6.2 % — SIGNIFICANT CHANGE UP (ref 2–14)
MONOCYTES NFR BLD AUTO: 7 % — SIGNIFICANT CHANGE UP (ref 2–14)
MONOCYTES NFR BLD AUTO: 7.2 % — SIGNIFICANT CHANGE UP (ref 2–14)
MONOCYTES NFR BLD AUTO: 7.7 % — SIGNIFICANT CHANGE UP (ref 2–14)
MONOCYTES NFR BLD AUTO: 7.8 % — SIGNIFICANT CHANGE UP (ref 2–14)
MONOCYTES NFR BLD AUTO: 8.7 % — SIGNIFICANT CHANGE UP (ref 2–14)
MONOCYTES NFR BLD AUTO: 8.8 % — SIGNIFICANT CHANGE UP (ref 2–14)
NEUTROPHILS # BLD AUTO: 1.6 K/UL — LOW (ref 1.8–7.4)
NEUTROPHILS # BLD AUTO: 3 K/UL — SIGNIFICANT CHANGE UP (ref 1.8–7.4)
NEUTROPHILS # BLD AUTO: 4.2 K/UL — SIGNIFICANT CHANGE UP (ref 1.8–7.4)
NEUTROPHILS # BLD AUTO: 4.7 K/UL — SIGNIFICANT CHANGE UP (ref 1.8–7.4)
NEUTROPHILS # BLD AUTO: 4.9 K/UL — SIGNIFICANT CHANGE UP (ref 1.8–7.4)
NEUTROPHILS # BLD AUTO: 5.1 K/UL — SIGNIFICANT CHANGE UP (ref 1.8–7.4)
NEUTROPHILS # BLD AUTO: 5.5 K/UL — SIGNIFICANT CHANGE UP (ref 1.8–7.4)
NEUTROPHILS # BLD AUTO: 5.7 K/UL — SIGNIFICANT CHANGE UP (ref 1.8–7.4)
NEUTROPHILS # BLD AUTO: 5.9 K/UL — SIGNIFICANT CHANGE UP (ref 1.8–7.4)
NEUTROPHILS # BLD AUTO: 7.2 K/UL — SIGNIFICANT CHANGE UP (ref 1.8–7.4)
NEUTROPHILS NFR BLD AUTO: 42.6 % — LOW (ref 43–77)
NEUTROPHILS NFR BLD AUTO: 53 % — SIGNIFICANT CHANGE UP (ref 43–77)
NEUTROPHILS NFR BLD AUTO: 60.7 % — SIGNIFICANT CHANGE UP (ref 43–77)
NEUTROPHILS NFR BLD AUTO: 62.8 % — SIGNIFICANT CHANGE UP (ref 43–77)
NEUTROPHILS NFR BLD AUTO: 63.1 % — SIGNIFICANT CHANGE UP (ref 43–77)
NEUTROPHILS NFR BLD AUTO: 64.8 % — SIGNIFICANT CHANGE UP (ref 43–77)
NEUTROPHILS NFR BLD AUTO: 65.7 % — SIGNIFICANT CHANGE UP (ref 43–77)
NEUTROPHILS NFR BLD AUTO: 67.3 % — SIGNIFICANT CHANGE UP (ref 43–77)
NEUTROPHILS NFR BLD AUTO: 70.3 % — SIGNIFICANT CHANGE UP (ref 43–77)
NEUTROPHILS NFR BLD AUTO: 76.4 % — SIGNIFICANT CHANGE UP (ref 43–77)
NITRITE UR QL STRIP: POSITIVE
NITRITE URINE: NEGATIVE
PH UR STRIP: 5.5
PH URINE: 6
PLATELET # BLD AUTO: 197 K/UL — SIGNIFICANT CHANGE UP (ref 150–400)
PLATELET # BLD AUTO: 206 K/UL — SIGNIFICANT CHANGE UP (ref 150–400)
PLATELET # BLD AUTO: 218 K/UL — SIGNIFICANT CHANGE UP (ref 150–400)
PLATELET # BLD AUTO: 221 K/UL — SIGNIFICANT CHANGE UP (ref 150–400)
PLATELET # BLD AUTO: 227 K/UL — SIGNIFICANT CHANGE UP (ref 150–400)
PLATELET # BLD AUTO: 228 K/UL — SIGNIFICANT CHANGE UP (ref 150–400)
PLATELET # BLD AUTO: 230 K/UL — SIGNIFICANT CHANGE UP (ref 150–400)
PLATELET # BLD AUTO: 248 K/UL — SIGNIFICANT CHANGE UP (ref 150–400)
PLATELET # BLD AUTO: 258 K/UL — SIGNIFICANT CHANGE UP (ref 150–400)
PLATELET # BLD AUTO: 262 K/UL — SIGNIFICANT CHANGE UP (ref 150–400)
POTASSIUM SERPL-SCNC: 4.2 MMOL/L
POTASSIUM SERPL-SCNC: 4.6 MMOL/L
PROT SERPL-MCNC: 7 G/DL
PROT SERPL-MCNC: 7.1 G/DL
PROT UR STRIP-MCNC: 300
PROTEIN URINE: ABNORMAL
RBC # BLD: 3.24 M/UL — LOW (ref 3.8–5.2)
RBC # BLD: 3.44 M/UL — LOW (ref 3.8–5.2)
RBC # BLD: 3.48 M/UL — LOW (ref 3.8–5.2)
RBC # BLD: 3.5 M/UL — LOW (ref 3.8–5.2)
RBC # BLD: 3.51 M/UL — LOW (ref 3.8–5.2)
RBC # BLD: 3.6 M/UL — LOW (ref 3.8–5.2)
RBC # BLD: 3.63 M/UL — LOW (ref 3.8–5.2)
RBC # BLD: 3.69 M/UL — LOW (ref 3.8–5.2)
RBC # BLD: 3.76 M/UL — LOW (ref 3.8–5.2)
RBC # BLD: 3.88 M/UL — SIGNIFICANT CHANGE UP (ref 3.8–5.2)
RBC # FLD: 13.5 % — SIGNIFICANT CHANGE UP (ref 10.3–14.5)
RBC # FLD: 13.7 % — SIGNIFICANT CHANGE UP (ref 10.3–14.5)
RBC # FLD: 14.3 % — SIGNIFICANT CHANGE UP (ref 10.3–14.5)
RBC # FLD: 14.5 % — SIGNIFICANT CHANGE UP (ref 10.3–14.5)
RBC # FLD: 14.6 % — HIGH (ref 10.3–14.5)
RBC # FLD: 14.7 % — HIGH (ref 10.3–14.5)
RBC # FLD: 15 % — HIGH (ref 10.3–14.5)
RBC # FLD: 15.3 % — HIGH (ref 10.3–14.5)
RBC # FLD: 16.2 % — HIGH (ref 10.3–14.5)
RBC # FLD: 16.4 % — HIGH (ref 10.3–14.5)
RED BLOOD CELLS URINE: 35 /HPF
SODIUM SERPL-SCNC: 135 MMOL/L
SODIUM SERPL-SCNC: 138 MMOL/L
SP GR UR STRIP: 1.02
SPECIFIC GRAVITY URINE: 1.02
SQUAMOUS EPITHELIAL CELLS: 1 /HPF
UROBILINOGEN URINE: NORMAL
WBC # BLD: 3.8 K/UL — SIGNIFICANT CHANGE UP (ref 3.8–10.5)
WBC # BLD: 5.6 K/UL — SIGNIFICANT CHANGE UP (ref 3.8–10.5)
WBC # BLD: 6.7 K/UL — SIGNIFICANT CHANGE UP (ref 3.8–10.5)
WBC # BLD: 7 K/UL — SIGNIFICANT CHANGE UP (ref 3.8–10.5)
WBC # BLD: 7.2 K/UL — SIGNIFICANT CHANGE UP (ref 3.8–10.5)
WBC # BLD: 8 K/UL — SIGNIFICANT CHANGE UP (ref 3.8–10.5)
WBC # BLD: 8.4 K/UL — SIGNIFICANT CHANGE UP (ref 3.8–10.5)
WBC # BLD: 8.6 K/UL — SIGNIFICANT CHANGE UP (ref 3.8–10.5)
WBC # BLD: 9.4 K/UL — SIGNIFICANT CHANGE UP (ref 3.8–10.5)
WBC # BLD: 9.4 K/UL — SIGNIFICANT CHANGE UP (ref 3.8–10.5)
WBC # FLD AUTO: 3.8 K/UL — SIGNIFICANT CHANGE UP (ref 3.8–10.5)
WBC # FLD AUTO: 5.6 K/UL — SIGNIFICANT CHANGE UP (ref 3.8–10.5)
WBC # FLD AUTO: 6.7 K/UL — SIGNIFICANT CHANGE UP (ref 3.8–10.5)
WBC # FLD AUTO: 7 K/UL — SIGNIFICANT CHANGE UP (ref 3.8–10.5)
WBC # FLD AUTO: 7.2 K/UL — SIGNIFICANT CHANGE UP (ref 3.8–10.5)
WBC # FLD AUTO: 8 K/UL — SIGNIFICANT CHANGE UP (ref 3.8–10.5)
WBC # FLD AUTO: 8.4 K/UL — SIGNIFICANT CHANGE UP (ref 3.8–10.5)
WBC # FLD AUTO: 8.6 K/UL — SIGNIFICANT CHANGE UP (ref 3.8–10.5)
WBC # FLD AUTO: 9.4 K/UL — SIGNIFICANT CHANGE UP (ref 3.8–10.5)
WBC # FLD AUTO: 9.4 K/UL — SIGNIFICANT CHANGE UP (ref 3.8–10.5)
WHITE BLOOD CELLS URINE: 68 /HPF

## 2019-01-01 PROCEDURE — 99214 OFFICE O/P EST MOD 30 MIN: CPT

## 2019-01-01 PROCEDURE — 52000 CYSTOURETHROSCOPY: CPT

## 2019-01-01 PROCEDURE — 74177 CT ABD & PELVIS W/CONTRAST: CPT | Mod: 26

## 2019-01-01 PROCEDURE — 82565 ASSAY OF CREATININE: CPT

## 2019-01-01 PROCEDURE — 71260 CT THORAX DX C+: CPT | Mod: 26

## 2019-01-01 PROCEDURE — 99215 OFFICE O/P EST HI 40 MIN: CPT

## 2019-01-01 PROCEDURE — 99213 OFFICE O/P EST LOW 20 MIN: CPT

## 2019-01-01 PROCEDURE — 81003 URINALYSIS AUTO W/O SCOPE: CPT | Mod: QW

## 2019-01-01 PROCEDURE — 71260 CT THORAX DX C+: CPT

## 2019-01-01 PROCEDURE — 74177 CT ABD & PELVIS W/CONTRAST: CPT

## 2019-01-01 PROCEDURE — 99203 OFFICE O/P NEW LOW 30 MIN: CPT

## 2019-01-01 RX ORDER — CIPROFLOXACIN HYDROCHLORIDE 500 MG/1
500 TABLET, FILM COATED ORAL TWICE DAILY
Qty: 14 | Refills: 0 | Status: DISCONTINUED | COMMUNITY
Start: 2019-01-01 | End: 2019-01-01

## 2019-01-01 RX ORDER — PHENAZOPYRIDINE 100 MG/1
100 TABLET, FILM COATED ORAL
Qty: 30 | Refills: 1 | Status: DISCONTINUED | COMMUNITY
Start: 2019-08-09 | End: 2019-01-01

## 2019-01-08 ENCOUNTER — APPOINTMENT (OUTPATIENT)
Age: 69
End: 2019-01-08

## 2019-01-08 ENCOUNTER — APPOINTMENT (OUTPATIENT)
Dept: HEMATOLOGY ONCOLOGY | Facility: CLINIC | Age: 69
End: 2019-01-08
Payer: MEDICARE

## 2019-01-08 ENCOUNTER — RESULT REVIEW (OUTPATIENT)
Age: 69
End: 2019-01-08

## 2019-01-08 ENCOUNTER — LABORATORY RESULT (OUTPATIENT)
Age: 69
End: 2019-01-08

## 2019-01-08 VITALS
BODY MASS INDEX: 45.27 KG/M2 | TEMPERATURE: 98.1 F | SYSTOLIC BLOOD PRESSURE: 141 MMHG | WEIGHT: 246 LBS | HEIGHT: 62 IN | DIASTOLIC BLOOD PRESSURE: 81 MMHG | HEART RATE: 103 BPM | OXYGEN SATURATION: 99 %

## 2019-01-08 LAB
BASOPHILS # BLD AUTO: 0.1 K/UL — SIGNIFICANT CHANGE UP (ref 0–0.2)
BASOPHILS NFR BLD AUTO: 1 % — SIGNIFICANT CHANGE UP (ref 0–2)
EOSINOPHIL # BLD AUTO: 0.1 K/UL — SIGNIFICANT CHANGE UP (ref 0–0.5)
EOSINOPHIL NFR BLD AUTO: 2.3 % — SIGNIFICANT CHANGE UP (ref 0–6)
HCT VFR BLD CALC: 33.1 % — LOW (ref 34.5–45)
HGB BLD-MCNC: 10.2 G/DL — LOW (ref 11.5–15.5)
LYMPHOCYTES # BLD AUTO: 2.3 K/UL — SIGNIFICANT CHANGE UP (ref 1–3.3)
LYMPHOCYTES # BLD AUTO: 41.1 % — SIGNIFICANT CHANGE UP (ref 13–44)
MCHC RBC-ENTMCNC: 27.6 PG — SIGNIFICANT CHANGE UP (ref 27–34)
MCHC RBC-ENTMCNC: 30.8 GM/DL — LOW (ref 32–36)
MCV RBC AUTO: 89.6 FL — SIGNIFICANT CHANGE UP (ref 80–100)
MONOCYTES # BLD AUTO: 0.5 K/UL — SIGNIFICANT CHANGE UP (ref 0–0.9)
MONOCYTES NFR BLD AUTO: 8.5 % — SIGNIFICANT CHANGE UP (ref 2–14)
NEUTROPHILS # BLD AUTO: 2.6 K/UL — SIGNIFICANT CHANGE UP (ref 1.8–7.4)
NEUTROPHILS NFR BLD AUTO: 47 % — SIGNIFICANT CHANGE UP (ref 43–77)
PLATELET # BLD AUTO: 164 K/UL — SIGNIFICANT CHANGE UP (ref 150–400)
RBC # BLD: 3.69 M/UL — LOW (ref 3.8–5.2)
RBC # FLD: 13.8 % — SIGNIFICANT CHANGE UP (ref 10.3–14.5)
WBC # BLD: 5.6 K/UL — SIGNIFICANT CHANGE UP (ref 3.8–10.5)
WBC # FLD AUTO: 5.6 K/UL — SIGNIFICANT CHANGE UP (ref 3.8–10.5)

## 2019-01-08 PROCEDURE — 99214 OFFICE O/P EST MOD 30 MIN: CPT

## 2019-01-08 NOTE — ASSESSMENT
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  Developed local recurrence at vaginal apex which progressed through endocrine therapy. \par MRI pelvis 4/16/18 with increase in size of mass 6.2 cm, locally advanced.  Received 6 cycles of carboplatin + taxol completed on 9/7/18.  \par \par Post chemotherapy MRI pelvis showed resolution or near resolution of vaginal cuff mass, with only mild thickening in left vaginal cuff, possibly fibrosis. Repeat MRI pelvis 11/16/18 showed enlarging vaginal cuff mass.  She received SBRT, completed 12/14/18.\par \par Plan: \par Neuropathy: continue gabapentin to 600 mg TID\par Follow up with Dr. Thompson\par Restaging scans possibly in 6-8 weeks, will discuss with Dr. Thompson\par RTO 6 weeks\par

## 2019-01-08 NOTE — ADDENDUM
[FreeTextEntry1] : I, Cha Thapa, acted solely as a scribe for Dr. Emperatriz Stanton on this date 1/8/18.

## 2019-01-08 NOTE — RESULTS/DATA
[FreeTextEntry1] : 8/7/18 CT chest/abd/pelvis: No evidence of recurrent or metastatic disease in the thorax, abdomen or pelvis.\par Previously identified enlarged bulky vaginal cuff now appears normal in size.\par \par \par 4/16/18 MRI pelvis: Vaginal cuff: Enhancing, diffusion restricted mass exophytic from the vaginal cuff with possible calcifications contiguous to, and possibly \par invading,  posterior bladder wall. Mass is also contiguous to the rectum and measures 6.1 x 4.3 x 6.2 cm.\par \par 1/29/18 CT chest/abd/pelvis - \par 3 mm left upper lobe lung nodule. No adenopathy in the thorax, abdomen or pelvis.\par Hysterectomy. Previously identified air containing collection superior to the vaginal cuff has resolved. 2.6 cm bulbous soft tissue area involving \par the left side of the vagina for which recurrent disease should be considered. \par Stable indeterminate left adrenal gland nodule.\par 1.7 cm right lobe thyroid nodule. Correlation with thyroid sonography suggested.\par \par 7/20/2017- PET- very prominent hypermetabolic activity within the uterus. No evidence of metastatic disease identified.

## 2019-01-08 NOTE — HISTORY OF PRESENT ILLNESS
[de-identified] : Ms. SERA JOVEL was diagnosed with endometrial cancer at age 65.\par  She was initially diagnosed with a grade 1 endometrial cancer in 2015 at [facility]. At that time she was seen by Dr. Givens at [place].  She evidently was determined not to be a candidate for an abdominal surgery and a vaginal hysterectomy was recommended. She was taken to the OR for a VH, however, the procedure was aborted due to the "uterus being glued to the colon". She was then treated with whole pelvic RT 5040 cGY completed 10/15/15.  It does not appear that she received any brachytherapy. \par \par In June 2017 she again presented with heavy vaginal bleeding. A mass was noted to be protruding from the vagina and was biopsied showing G1 endometrioid adenocarcinoma. Screening for Encarnacion syndrome with MSI showed loss of expression of MLH1 and PMS2, however MLH promotor methylation was present suggesting a sporadic tumor. She was started on an aromatase inhibitor and referred to Dr. Pascale London for surgical consultation.  She underwent robotic laparoscopic hysterectomy and bilateral salpingo-oophorectomy on 10/12/2017.\par \par Since December 2017, she notes daily vaginal spotting.  She was seen for follow by Dr. London who noted a vaginal apex recurrence per patient.  Biopsy of vaginal apex on 1/2/18 was positive for endometrial adenocarcinoma, c/w a recurrent endometrioid type adenocarcinoma, diffusely ER positive (almost 100%), and [place] positive (>90%).\par February 2018 - trial of megace + tamoxifen alternating - developed increased vaginal spotting with Tamoxifen - stopped both meds in March 2018. \par 5/11/18 - 9/7/18  6 cycles of carboplatin + taxol (q 3 week)\par \par \par  [de-identified] : Returns for follow up.  Completed carbo-taxol on 9/7/18.\par Completed vaginal cuff mass SBRT on 11/14/18. She reports urinary incontinence. She denies any vaginal bleeding or discharge. She denies any hematuria. She has intermittent diarrhea. Reports persistent neuropathy in her hands and feet, but worse in feet. She is on gabapentin 600 mg BID.\par No fever. No nausea/vomiting/constipation.\par \par 11/16/18 MRI Pelvis:\par Vaginal cuff mass, increased in size (4.2 x 4.3 x 2.7 cm) compared to 9/28/18 when it was nearly resolved, but not as large as 4/16/18 (6.1 x 4.3 x 6.2 cm). The anterior aspect of the mass is inseparable from the bladder wall, therefore bladder wall involvement cannot be excluded

## 2019-01-16 ENCOUNTER — APPOINTMENT (OUTPATIENT)
Dept: RADIATION ONCOLOGY | Facility: CLINIC | Age: 69
End: 2019-01-16
Payer: MEDICARE

## 2019-01-16 VITALS
DIASTOLIC BLOOD PRESSURE: 80 MMHG | OXYGEN SATURATION: 99 % | WEIGHT: 246 LBS | SYSTOLIC BLOOD PRESSURE: 140 MMHG | RESPIRATION RATE: 16 BRPM | HEIGHT: 62 IN | BODY MASS INDEX: 45.27 KG/M2 | HEART RATE: 98 BPM

## 2019-01-16 PROCEDURE — 99024 POSTOP FOLLOW-UP VISIT: CPT

## 2019-01-16 NOTE — PHYSICAL EXAM
[Extraocular Movements] : extraocular movements were intact [Normal External Genitalia] : normal external genitalia  [Normal] : oriented to person, place and time, the affect was normal, the mood was normal and not anxious [de-identified] : Examination performed in the presence of a female chaperone.  Vaginal cuff with scar tissue, though blood noted in vault.  No brit tumor visible, though left aspect of cuff not well visualized.

## 2019-01-16 NOTE — VITALS
[Maximal Pain Intensity: 0/10] : 0/10 [Least Pain Intensity: 0/10] : 0/10 [NoTreatment Scheduled] : no treatment scheduled [80: Normal activity with effort; some signs or symptoms of disease.] : 80: Normal activity with effort; some signs or symptoms of disease.  [ECOG Performance Status: 1 - Restricted in physically strenuous activity but ambulatory and able to carry out work of a light or sedentary nature] : Performance Status: 1 - Restricted in physically strenuous activity but ambulatory and able to carry out work of a light or sedentary nature, e.g., light house work, office work

## 2019-01-16 NOTE — REVIEW OF SYSTEMS
[Diarrhea: Grade 1 - Increase of <4 stools per day over baseline; mild increase in ostomy output compared to baseline] : Diarrhea: Grade 1 - Increase of <4 stools per day over baseline; mild increase in ostomy output compared to baseline [Hematuria: Grade 0] : Hematuria: Grade 0 [Urinary Incontinence: Grade 1 - Occasional (e.g., with coughing, sneezing, etc.), pads not indicated] : Urinary Incontinence: Grade 1 - Occasional (e.g., with coughing, sneezing, etc.), pads not indicated [Anal Pain: Grade 0] : Anal Pain: Grade 0 [Constipation: Grade 0] : Constipation: Grade 0 [Fecal Incontinence: Grade 0] : Fecal Incontinence: Grade 0 [Proctitis: Grade 0] : Proctitis: Grade 0 [Rectal Pain: Grade 0] : Rectal Pain: Grade 0 [Edema Limbs: Grade 0] : Edema Limbs: Grade 0  [Fatigue: Grade 0] : Fatigue: Grade 0 [Localized Edema: Grade 0] : Localized Edema: Grade 0  [Neck Edema: Grade 0] : Neck Edema: Grade 0 [Urinary Retention: Grade 0] : Urinary Retention: Grade 0 [Urinary Tract Pain: Grade 0] : Urinary Tract Pain: Grade 0 [Urinary Urgency: Grade 0] : Urinary Urgency: Grade 0 [Urinary Frequency: Grade 0] : Urinary Frequency: Grade 0 [Alopecia: Grade 0] : Alopecia: Grade 0 [Pruritus: Grade 0] : Pruritus: Grade 0 [Skin Atrophy: Grade 0] : Skin Atrophy: Grade 0 [Skin Hyperpigmentation: Grade 0] : Skin Hyperpigmentation: Grade 0 [Dermatitis Radiation: Grade 0] : Dermatitis Radiation: Grade 0

## 2019-01-16 NOTE — REASON FOR VISIT
[Post-Treatment Evaluation] : post-treatment evaluation for [Endometrial Cancer] : endometrial cancer [Family Member] : family member

## 2019-01-16 NOTE — DISEASE MANAGEMENT
[Pathological] : TNM Stage: p [IIIA] : IIIA [FreeTextEntry4] : recurrent endometrial adenocarcinoma [TTNM] : 3a [NTNM] : x [MTNM] : 0 [de-identified] : vaginal cuff tumor

## 2019-02-07 ENCOUNTER — OUTPATIENT (OUTPATIENT)
Dept: OUTPATIENT SERVICES | Facility: HOSPITAL | Age: 69
LOS: 1 days | Discharge: HOME | End: 2019-02-07

## 2019-02-07 DIAGNOSIS — Z98.42 CATARACT EXTRACTION STATUS, LEFT EYE: Chronic | ICD-10-CM

## 2019-02-07 DIAGNOSIS — D64.81 ANEMIA DUE TO ANTINEOPLASTIC CHEMOTHERAPY: ICD-10-CM

## 2019-02-07 DIAGNOSIS — Z96.649 PRESENCE OF UNSPECIFIED ARTIFICIAL HIP JOINT: Chronic | ICD-10-CM

## 2019-02-07 DIAGNOSIS — D50.1 SIDEROPENIC DYSPHAGIA: ICD-10-CM

## 2019-02-07 DIAGNOSIS — Z96.653 PRESENCE OF ARTIFICIAL KNEE JOINT, BILATERAL: Chronic | ICD-10-CM

## 2019-02-07 DIAGNOSIS — C54.1 MALIGNANT NEOPLASM OF ENDOMETRIUM: ICD-10-CM

## 2019-02-12 NOTE — DISCUSSION/SUMMARY
[Systemic Therapy (chemotherapy, hormonal therapy, other)] : Systemic Therapy (chemotherapy, hormonal therapy, other): Yes [FreeTextEntry8] : Anabel Garcia

## 2019-02-19 ENCOUNTER — APPOINTMENT (OUTPATIENT)
Age: 69
End: 2019-02-19

## 2019-02-19 ENCOUNTER — APPOINTMENT (OUTPATIENT)
Dept: MRI IMAGING | Facility: CLINIC | Age: 69
End: 2019-02-19
Payer: MEDICARE

## 2019-02-19 ENCOUNTER — APPOINTMENT (OUTPATIENT)
Dept: HEMATOLOGY ONCOLOGY | Facility: CLINIC | Age: 69
End: 2019-02-19
Payer: MEDICARE

## 2019-02-19 ENCOUNTER — OUTPATIENT (OUTPATIENT)
Dept: OUTPATIENT SERVICES | Facility: HOSPITAL | Age: 69
LOS: 1 days | End: 2019-02-19

## 2019-02-19 VITALS
HEART RATE: 118 BPM | WEIGHT: 244 LBS | TEMPERATURE: 98.5 F | DIASTOLIC BLOOD PRESSURE: 82 MMHG | OXYGEN SATURATION: 100 % | BODY MASS INDEX: 44.9 KG/M2 | SYSTOLIC BLOOD PRESSURE: 161 MMHG | HEIGHT: 62 IN

## 2019-02-19 DIAGNOSIS — C54.1 MALIGNANT NEOPLASM OF ENDOMETRIUM: ICD-10-CM

## 2019-02-19 DIAGNOSIS — Z98.42 CATARACT EXTRACTION STATUS, LEFT EYE: Chronic | ICD-10-CM

## 2019-02-19 DIAGNOSIS — Z96.649 PRESENCE OF UNSPECIFIED ARTIFICIAL HIP JOINT: Chronic | ICD-10-CM

## 2019-02-19 DIAGNOSIS — Z96.653 PRESENCE OF ARTIFICIAL KNEE JOINT, BILATERAL: Chronic | ICD-10-CM

## 2019-02-19 PROCEDURE — 72197 MRI PELVIS W/O & W/DYE: CPT | Mod: 26

## 2019-02-19 PROCEDURE — 99214 OFFICE O/P EST MOD 30 MIN: CPT

## 2019-02-19 RX ORDER — GABAPENTIN 600 MG/1
600 TABLET, COATED ORAL 3 TIMES DAILY
Qty: 90 | Refills: 2 | Status: DISCONTINUED | COMMUNITY
Start: 2018-08-10 | End: 2019-02-19

## 2019-02-20 ENCOUNTER — RX RENEWAL (OUTPATIENT)
Age: 69
End: 2019-02-20

## 2019-02-20 NOTE — ADDENDUM
[FreeTextEntry1] : I, Cha Thapa, acted solely as a scribe for Dr. Emperatriz Stanton on this date 2/19/18.

## 2019-02-20 NOTE — PHYSICAL EXAM
[Obese] : obese [Normal] : no peripheral adenopathy appreciated [de-identified] : clear [de-identified] : s1/s2 [de-identified] : no edema [de-identified] : obese, non tender, non distended

## 2019-02-20 NOTE — HISTORY OF PRESENT ILLNESS
[de-identified] : Ms. SERA JOVEL was diagnosed with endometrial cancer at age 65.\par  She was initially diagnosed with a grade 1 endometrial cancer in 2015 at [facility]. At that time she was seen by Dr. Givens at [place].  She evidently was determined not to be a candidate for an abdominal surgery and a vaginal hysterectomy was recommended. She was taken to the OR for a VH, however, the procedure was aborted due to the "uterus being glued to the colon". She was then treated with whole pelvic RT 5040 cGY completed 10/15/15.  It does not appear that she received any brachytherapy. \par \par In June 2017 she again presented with heavy vaginal bleeding. A mass was noted to be protruding from the vagina and was biopsied showing G1 endometrioid adenocarcinoma. Screening for Encarnacion syndrome with MSI showed loss of expression of MLH1 and PMS2, however MLH promotor methylation was present suggesting a sporadic tumor. She was started on an aromatase inhibitor and referred to Dr. Pascale London for surgical consultation.  She underwent robotic laparoscopic hysterectomy and bilateral salpingo-oophorectomy on 10/12/2017.\par \par Since December 2017, she notes daily vaginal spotting.  She was seen for follow by Dr. London who noted a vaginal apex recurrence per patient.  Biopsy of vaginal apex on 1/2/18 was positive for endometrial adenocarcinoma, c/w a recurrent endometrioid type adenocarcinoma, diffusely ER positive (almost 100%), and [place] positive (>90%).\par February 2018 - trial of megace + tamoxifen alternating - developed increased vaginal spotting with Tamoxifen - stopped both meds in March 2018. \par 5/11/18 - 9/7/18  6 cycles of carboplatin + taxol (q 3 week)\par \par \par  [de-identified] : Returns for follow up.  Completed carbo-taxol on 9/7/18. Completed vaginal cuff mass SBRT on 11/14/18. \par She continues to have neuropathy in her feet and gabapentin only minimal helps. \par No fever. No nausea/vomiting/constipation.\par She had an MRI pelvis today.

## 2019-02-20 NOTE — RESULTS/DATA
[FreeTextEntry1] : 11/16/18 MRI Pelvis:\par Vaginal cuff mass, increased in size (4.2 x 4.3 x 2.7 cm) compared to 9/28/18 when it was nearly resolved, but not as large as 4/16/18 (6.1 x 4.3 x 6.2 cm). The anterior aspect of the mass is inseparable from the bladder wall, therefore bladder wall involvement cannot be excluded\par \par 8/7/18 CT chest/abd/pelvis: No evidence of recurrent or metastatic disease in the thorax, abdomen or pelvis.\par Previously identified enlarged bulky vaginal cuff now appears normal in size.\par \par 4/16/18 MRI pelvis: Vaginal cuff: Enhancing, diffusion restricted mass exophytic from the vaginal cuff with possible calcifications contiguous to, and possibly \par invading,  posterior bladder wall. Mass is also contiguous to the rectum and measures 6.1 x 4.3 x 6.2 cm.\par \par 1/29/18 CT chest/abd/pelvis - \par 3 mm left upper lobe lung nodule. No adenopathy in the thorax, abdomen or pelvis.\par Hysterectomy. Previously identified air containing collection superior to the vaginal cuff has resolved. 2.6 cm bulbous soft tissue area involving \par the left side of the vagina for which recurrent disease should be considered. \par Stable indeterminate left adrenal gland nodule.\par 1.7 cm right lobe thyroid nodule. Correlation with thyroid sonography suggested.\par \par 7/20/2017- PET- very prominent hypermetabolic activity within the uterus. No evidence of metastatic disease identified.

## 2019-02-20 NOTE — ASSESSMENT
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  Developed local recurrence at vaginal apex which progressed through endocrine therapy. \par MRI pelvis 4/16/18 with increase in size of mass 6.2 cm, locally advanced.  Received 6 cycles of Carboplatin + Taxol completed on 9/7/18 with great response however MRI pelvis on 11/16/18 showed enlarging mass to which she received SBRT completed on 12/14/18.   \par \par Doing well clinically.  Neuropathy has improved except that the dorsal aspect of her feet which feel swollen and tense at time except that she has no feet swelling.\par \par \par Plan: \par Neuropathy: switch from gabapentin to Lyrica \par MRI pelvis done today - results pending\par Follow up with Dr. Thompson\par RTO 3 months \par

## 2019-02-27 ENCOUNTER — APPOINTMENT (OUTPATIENT)
Dept: RADIATION ONCOLOGY | Facility: CLINIC | Age: 69
End: 2019-02-27
Payer: MEDICARE

## 2019-02-27 VITALS
TEMPERATURE: 98.6 F | WEIGHT: 245 LBS | BODY MASS INDEX: 45.08 KG/M2 | SYSTOLIC BLOOD PRESSURE: 138 MMHG | DIASTOLIC BLOOD PRESSURE: 87 MMHG | HEIGHT: 62 IN | OXYGEN SATURATION: 97 % | RESPIRATION RATE: 16 BRPM | HEART RATE: 94 BPM

## 2019-02-27 PROCEDURE — 99214 OFFICE O/P EST MOD 30 MIN: CPT

## 2019-02-27 NOTE — DISEASE MANAGEMENT
[Pathological] : TNM Stage: p [IIIA] : IIIA [FreeTextEntry4] : recurrent adenocarcinoma of the endometrium [TTNM] : 3a [NTNM] : x [MTNM] : 0

## 2019-02-27 NOTE — REVIEW OF SYSTEMS
[Negative] : Allergic/Immunologic [Constipation: Grade 0] : Constipation: Grade 0 [Diarrhea: Grade 0] : Diarrhea: Grade 0 [Edema Limbs: Grade 0] : Edema Limbs: Grade 0  [Fatigue: Grade 0] : Fatigue: Grade 0 [Localized Edema: Grade 0] : Localized Edema: Grade 0  [Neck Edema: Grade 0] : Neck Edema: Grade 0 [Urinary Incontinence: Grade 2 - Spontaneous; pads indicated; limiting instrumental ADL] : Urinary Incontinence: Grade 2 - Spontaneous; pads indicated; limiting instrumental ADL [FreeTextEntry8] : stress incontinence  [Hematuria: Grade 0] : Hematuria: Grade 0 [Urinary Retention: Grade 0] : Urinary Retention: Grade 0 [Urinary Tract Pain: Grade 0] : Urinary Tract Pain: Grade 0 [Urinary Urgency: Grade 0] : Urinary Urgency: Grade 0 [Urinary Frequency: Grade 0] : Urinary Frequency: Grade 0 [Genital Edema: Grade 0] : Genital Edema: Grade 0 [Vaginal Stricture: Grade 1 - Asymptomatic; mild vaginal shortening or narrowing] : Vaginal Stricture: Grade 1 - Asymptomatic; mild vaginal shortening or narrowing [Vaginal Infection: Grade 0] : Vaginal Infection: Grade 0  [Dyspareunia: Grade 1 - Mild discomfort or pain associated with vaginal penetration; discomfort relieved with use of vaginal lubricants or estrogen] : Dyspareunia: Grade 1 - Mild discomfort or pain associated with vaginal penetration; discomfort relieved with use of vaginal lubricants or estrogen

## 2019-02-27 NOTE — PHYSICAL EXAM
[Normal External Genitalia] : normal external genitalia  [Pink Rugae] : pink rugae [Tenderness] : no tenderness [Scant] : there was scant vaginal bleeding [Old Blood] : old blood was present in the vagina [Absent] : absent [Normal] : no palpable adenopathy [de-identified] : Examination performed in the presence of a female chaperone.  No friable mass or exophytic tumor appreciated.

## 2019-02-27 NOTE — HISTORY OF PRESENT ILLNESS
[FreeTextEntry1] : 68 year old female returns today s/p 2,400cGy SBRT to the  vaginal cuff for a [id number] [id number] recurrent endometrial adenocarcinoma completed 12/14/18. She initially underwent primary pelvic external beam radiation to 50.4 Gy completing 10/2015, with recurrence in 6/2017 for which she underwent RaTLH/BSO in 10/2017, and developed recurrence in the vaginal cuff in 1/2018.\par She is s/p carbo-taxol with Dr. Stanton whom she followed with on 2/19/19; saw Dr. Boyce 3 weeks ago and does not have an appointment with Dr. London.\par \par Since completion of SBRT, she reports urinary stress incontinence was temporarily worsened but has improved over the past few weeks.  She reports baseline of wearing an underwear liner for incontinence.  She denies any episodes of vaginal bleeding or discharge.  Denies vaginal itch, local edema, change in bowel, pelvic pain, fatigue or unintentional weight loss.  She is interested in losing weight.\par \par MRI pelvis 2/19/19: Decreased size of recurrent tumor in the upper vagina measuring 2.0 x 1.2 x \par 2.5 cm (Craniocaudal, AP, transverse dimensions) (7; 12), previously 4.0 x 2.5 x 4.6 cm. There is now clear fat separation between the vaginal cuff and posterior bladder wall. No pelvic adenopathy.

## 2019-02-28 ENCOUNTER — TRANSCRIPTION ENCOUNTER (OUTPATIENT)
Age: 69
End: 2019-02-28

## 2019-04-09 ENCOUNTER — FORM ENCOUNTER (OUTPATIENT)
Age: 69
End: 2019-04-09

## 2019-04-10 ENCOUNTER — APPOINTMENT (OUTPATIENT)
Dept: NUCLEAR MEDICINE | Facility: CLINIC | Age: 69
End: 2019-04-10
Payer: MEDICARE

## 2019-04-10 ENCOUNTER — OUTPATIENT (OUTPATIENT)
Dept: OUTPATIENT SERVICES | Facility: HOSPITAL | Age: 69
LOS: 1 days | End: 2019-04-10

## 2019-04-10 DIAGNOSIS — C54.1 MALIGNANT NEOPLASM OF ENDOMETRIUM: ICD-10-CM

## 2019-04-10 DIAGNOSIS — Z96.649 PRESENCE OF UNSPECIFIED ARTIFICIAL HIP JOINT: Chronic | ICD-10-CM

## 2019-04-10 DIAGNOSIS — Z98.42 CATARACT EXTRACTION STATUS, LEFT EYE: Chronic | ICD-10-CM

## 2019-04-10 DIAGNOSIS — Z96.653 PRESENCE OF ARTIFICIAL KNEE JOINT, BILATERAL: Chronic | ICD-10-CM

## 2019-04-10 PROCEDURE — 78815 PET IMAGE W/CT SKULL-THIGH: CPT | Mod: 26,PS

## 2019-04-18 ENCOUNTER — OUTPATIENT (OUTPATIENT)
Dept: OUTPATIENT SERVICES | Facility: HOSPITAL | Age: 69
LOS: 1 days | Discharge: HOME | End: 2019-04-18

## 2019-04-18 DIAGNOSIS — Z96.649 PRESENCE OF UNSPECIFIED ARTIFICIAL HIP JOINT: Chronic | ICD-10-CM

## 2019-04-18 DIAGNOSIS — T45.1X5A ADVERSE EFFECT OF ANTINEOPLASTIC AND IMMUNOSUPPRESSIVE DRUGS, INITIAL ENCOUNTER: ICD-10-CM

## 2019-04-18 DIAGNOSIS — C54.1 MALIGNANT NEOPLASM OF ENDOMETRIUM: ICD-10-CM

## 2019-04-18 DIAGNOSIS — D64.81 ANEMIA DUE TO ANTINEOPLASTIC CHEMOTHERAPY: ICD-10-CM

## 2019-04-18 DIAGNOSIS — D50.0 IRON DEFICIENCY ANEMIA SECONDARY TO BLOOD LOSS (CHRONIC): ICD-10-CM

## 2019-04-18 DIAGNOSIS — Z98.42 CATARACT EXTRACTION STATUS, LEFT EYE: Chronic | ICD-10-CM

## 2019-04-18 DIAGNOSIS — Z96.653 PRESENCE OF ARTIFICIAL KNEE JOINT, BILATERAL: Chronic | ICD-10-CM

## 2019-04-19 ENCOUNTER — RESULT REVIEW (OUTPATIENT)
Age: 69
End: 2019-04-19

## 2019-04-19 ENCOUNTER — APPOINTMENT (OUTPATIENT)
Dept: HEMATOLOGY ONCOLOGY | Facility: CLINIC | Age: 69
End: 2019-04-19
Payer: MEDICARE

## 2019-04-19 VITALS
BODY MASS INDEX: 44.9 KG/M2 | SYSTOLIC BLOOD PRESSURE: 167 MMHG | OXYGEN SATURATION: 99 % | WEIGHT: 244 LBS | DIASTOLIC BLOOD PRESSURE: 87 MMHG | TEMPERATURE: 98.5 F | HEART RATE: 99 BPM | HEIGHT: 62 IN

## 2019-04-19 LAB
BASOPHILS # BLD AUTO: 0.1 K/UL — SIGNIFICANT CHANGE UP (ref 0–0.2)
BASOPHILS NFR BLD AUTO: 0.9 % — SIGNIFICANT CHANGE UP (ref 0–2)
EOSINOPHIL # BLD AUTO: 0.1 K/UL — SIGNIFICANT CHANGE UP (ref 0–0.5)
EOSINOPHIL NFR BLD AUTO: 2 % — SIGNIFICANT CHANGE UP (ref 0–6)
HCT VFR BLD CALC: 33 % — LOW (ref 34.5–45)
HGB BLD-MCNC: 10.4 G/DL — LOW (ref 11.5–15.5)
LYMPHOCYTES # BLD AUTO: 2.1 K/UL — SIGNIFICANT CHANGE UP (ref 1–3.3)
LYMPHOCYTES # BLD AUTO: 33.5 % — SIGNIFICANT CHANGE UP (ref 13–44)
MCHC RBC-ENTMCNC: 28.1 PG — SIGNIFICANT CHANGE UP (ref 27–34)
MCHC RBC-ENTMCNC: 31.6 G/DL — LOW (ref 32–36)
MCV RBC AUTO: 88.9 FL — SIGNIFICANT CHANGE UP (ref 80–100)
MONOCYTES # BLD AUTO: 0.5 K/UL — SIGNIFICANT CHANGE UP (ref 0–0.9)
MONOCYTES NFR BLD AUTO: 8.3 % — SIGNIFICANT CHANGE UP (ref 2–14)
NEUTROPHILS # BLD AUTO: 3.4 K/UL — SIGNIFICANT CHANGE UP (ref 1.8–7.4)
NEUTROPHILS NFR BLD AUTO: 55.3 % — SIGNIFICANT CHANGE UP (ref 43–77)
PLATELET # BLD AUTO: 182 K/UL — SIGNIFICANT CHANGE UP (ref 150–400)
RBC # BLD: 3.71 M/UL — LOW (ref 3.8–5.2)
RBC # FLD: 13.3 % — SIGNIFICANT CHANGE UP (ref 10.3–14.5)
WBC # BLD: 6.1 K/UL — SIGNIFICANT CHANGE UP (ref 3.8–10.5)
WBC # FLD AUTO: 6.1 K/UL — SIGNIFICANT CHANGE UP (ref 3.8–10.5)

## 2019-04-19 PROCEDURE — 99214 OFFICE O/P EST MOD 30 MIN: CPT

## 2019-04-19 RX ORDER — PREGABALIN 75 MG/1
75 CAPSULE ORAL
Qty: 60 | Refills: 2 | Status: DISCONTINUED | COMMUNITY
Start: 2019-02-19 | End: 2019-04-19

## 2019-04-22 LAB
ALBUMIN SERPL ELPH-MCNC: 3.7 G/DL
ALP BLD-CCNC: 56 U/L
ALT SERPL-CCNC: 15 U/L
ANION GAP SERPL CALC-SCNC: 13 MMOL/L
AST SERPL-CCNC: 17 U/L
BILIRUB SERPL-MCNC: 0.2 MG/DL
BUN SERPL-MCNC: 14 MG/DL
CALCIUM SERPL-MCNC: 8.8 MG/DL
CHLORIDE SERPL-SCNC: 103 MMOL/L
CO2 SERPL-SCNC: 26 MMOL/L
CREAT SERPL-MCNC: 0.92 MG/DL
GLUCOSE SERPL-MCNC: 88 MG/DL
POTASSIUM SERPL-SCNC: 4.4 MMOL/L
PROT SERPL-MCNC: 6.7 G/DL
SODIUM SERPL-SCNC: 141 MMOL/L

## 2019-04-24 NOTE — ADDENDUM
[FreeTextEntry1] : I, Cha Thapa, acted solely as a scribe for Dr. Emperatriz Stanton on this date 4/19/19.

## 2019-04-24 NOTE — ASSESSMENT
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  Developed local recurrence at vaginal apex which progressed through endocrine therapy. \par MRI pelvis 4/16/18 with increase in size of mass 6.2 cm, locally advanced.  Received 6 cycles of Carboplatin + Taxol completed on 9/7/18 with great response however MRI pelvis on 11/16/18 showed enlarging mass to which she received SBRT completed on 12/14/18.   \par \par 4/10/19 PET with persistent avidity at vaginal cuff and activity suspicious for mets to lymph nodes.  Given that she is MSI-H, we discussed keytruda every 3 weeks as next line of treatment.  Side effects reviewed. She signed informed consent.  She continues to have vaginal spotting and also has persistent neuropathy. \par \par Plan: \par Neuropathy: switch from gabapentin to Cymbalta 40 mg QHS\par Begin q 3 week Keytruda\par RTO 1 month

## 2019-04-24 NOTE — PHYSICAL EXAM
[Obese] : obese [Normal] : affect appropriate [de-identified] : clear [de-identified] : s1/s2 [de-identified] : no edema [de-identified] : obese, non tender, non distended

## 2019-04-24 NOTE — HISTORY OF PRESENT ILLNESS
[de-identified] : Ms. SERA JOVEL was diagnosed with endometrial cancer at age 65.\par  She was initially diagnosed with a grade 1 endometrial cancer in 2015 at [facility]. At that time she was seen by Dr. Givens at [place].  She evidently was determined not to be a candidate for an abdominal surgery and a vaginal hysterectomy was recommended. She was taken to the OR for a VH, however, the procedure was aborted due to the "uterus being glued to the colon". She was then treated with whole pelvic RT 5040 cGY completed 10/15/15.  It does not appear that she received any brachytherapy. \par \par In June 2017 she again presented with heavy vaginal bleeding. A mass was noted to be protruding from the vagina and was biopsied showing G1 endometrioid adenocarcinoma. Screening for Encarnacion syndrome with MSI showed loss of expression of MLH1 and PMS2, however MLH promotor methylation was present suggesting a sporadic tumor. She was started on an aromatase inhibitor and referred to Dr. Pascale London for surgical consultation.  She underwent robotic laparoscopic hysterectomy and bilateral salpingo-oophorectomy on 10/12/2017.\par \par Since December 2017, she notes daily vaginal spotting.  She was seen for follow by Dr. London who noted a vaginal apex recurrence per patient.  Biopsy of vaginal apex on 1/2/18 was positive for endometrial adenocarcinoma, c/w a recurrent endometrioid type adenocarcinoma, diffusely ER positive (almost 100%), and [place] positive (>90%).\par February 2018 - trial of megace + tamoxifen alternating - developed increased vaginal spotting with Tamoxifen - stopped both meds in March 2018. \par 5/11/18 - 9/7/18  6 cycles of carboplatin + taxol (q 3 week) [de-identified] : Returns for follow up.  Completed carbo-taxol on 9/7/18. Completed vaginal cuff mass SBRT on 11/14/18. \par Reports vaginal spotting. \par She continues to have neuropathy in her feet and currently taking gabapentin 600 mg BID. She was unable to switch to Lyrica because of the price. \par No fever. No nausea/vomiting/constipation.\par \par 4/10/19 PET/CT:\par 1. Persistent, but decreased intensity and extent of circumferential FDG avidity vaginal cuff, compatible with recurrent/residual disease. \par 2. FDG avid right external iliac/obturator lymph node, suspicious for metastatic disease. Mildly FDG avid right inguinal lymph node is indeterminate, possibly metastatic. \par 3. Unchanged non-FDG avid bilateral iliac sclerosis, right greater than left. \par 4. New mildly FDG avid mucoperiosteal thickening right maxillary sinus, probably inflammatory.

## 2019-04-26 ENCOUNTER — LABORATORY RESULT (OUTPATIENT)
Age: 69
End: 2019-04-26

## 2019-04-26 ENCOUNTER — APPOINTMENT (OUTPATIENT)
Age: 69
End: 2019-04-26

## 2019-04-30 DIAGNOSIS — Z51.11 ENCOUNTER FOR ANTINEOPLASTIC CHEMOTHERAPY: ICD-10-CM

## 2019-05-17 ENCOUNTER — LABORATORY RESULT (OUTPATIENT)
Age: 69
End: 2019-05-17

## 2019-05-17 ENCOUNTER — APPOINTMENT (OUTPATIENT)
Age: 69
End: 2019-05-17

## 2019-05-17 ENCOUNTER — APPOINTMENT (OUTPATIENT)
Dept: HEMATOLOGY ONCOLOGY | Facility: CLINIC | Age: 69
End: 2019-05-17
Payer: MEDICARE

## 2019-05-17 ENCOUNTER — RESULT REVIEW (OUTPATIENT)
Age: 69
End: 2019-05-17

## 2019-05-17 LAB
BASOPHILS # BLD AUTO: 0 K/UL — SIGNIFICANT CHANGE UP (ref 0–0.2)
BASOPHILS NFR BLD AUTO: 0.7 % — SIGNIFICANT CHANGE UP (ref 0–2)
EOSINOPHIL # BLD AUTO: 0.2 K/UL — SIGNIFICANT CHANGE UP (ref 0–0.5)
EOSINOPHIL NFR BLD AUTO: 2.6 % — SIGNIFICANT CHANGE UP (ref 0–6)
HCT VFR BLD CALC: 31.8 % — LOW (ref 34.5–45)
HGB BLD-MCNC: 9.6 G/DL — LOW (ref 11.5–15.5)
LYMPHOCYTES # BLD AUTO: 1.7 K/UL — SIGNIFICANT CHANGE UP (ref 1–3.3)
LYMPHOCYTES # BLD AUTO: 27.3 % — SIGNIFICANT CHANGE UP (ref 13–44)
MCHC RBC-ENTMCNC: 26.8 PG — LOW (ref 27–34)
MCHC RBC-ENTMCNC: 30.4 G/DL — LOW (ref 32–36)
MCV RBC AUTO: 88.2 FL — SIGNIFICANT CHANGE UP (ref 80–100)
MONOCYTES # BLD AUTO: 0.5 K/UL — SIGNIFICANT CHANGE UP (ref 0–0.9)
MONOCYTES NFR BLD AUTO: 8.6 % — SIGNIFICANT CHANGE UP (ref 2–14)
NEUTROPHILS # BLD AUTO: 3.8 K/UL — SIGNIFICANT CHANGE UP (ref 1.8–7.4)
NEUTROPHILS NFR BLD AUTO: 60.8 % — SIGNIFICANT CHANGE UP (ref 43–77)
PLATELET # BLD AUTO: 195 K/UL — SIGNIFICANT CHANGE UP (ref 150–400)
RBC # BLD: 3.6 M/UL — LOW (ref 3.8–5.2)
RBC # FLD: 13.2 % — SIGNIFICANT CHANGE UP (ref 10.3–14.5)
WBC # BLD: 6.3 K/UL — SIGNIFICANT CHANGE UP (ref 3.8–10.5)
WBC # FLD AUTO: 6.3 K/UL — SIGNIFICANT CHANGE UP (ref 3.8–10.5)

## 2019-05-17 PROCEDURE — 99214 OFFICE O/P EST MOD 30 MIN: CPT

## 2019-05-17 NOTE — HISTORY OF PRESENT ILLNESS
[de-identified] : Ms. SERA JOVEL was diagnosed with endometrial cancer at age 65.\par  She was initially diagnosed with a grade 1 endometrial cancer in 2015 at [facility]. At that time she was seen by Dr. Givens at [place].  She evidently was determined not to be a candidate for an abdominal surgery and a vaginal hysterectomy was recommended. She was taken to the OR for a VH, however, the procedure was aborted due to the "uterus being glued to the colon". She was then treated with whole pelvic RT 5040 cGY completed 10/15/15.  It does not appear that she received any brachytherapy. \par \par In June 2017 she again presented with heavy vaginal bleeding. A mass was noted to be protruding from the vagina and was biopsied showing G1 endometrioid adenocarcinoma. Screening for Encarnacion syndrome with MSI showed loss of expression of MLH1 and PMS2, however MLH promotor methylation was present suggesting a sporadic tumor. She was started on an aromatase inhibitor and referred to Dr. Pascale London for surgical consultation.  She underwent robotic laparoscopic hysterectomy and bilateral salpingo-oophorectomy on 10/12/2017.\par \par Since December 2017, she notes daily vaginal spotting.  She was seen for follow by Dr. London who noted a vaginal apex recurrence per patient.  Biopsy of vaginal apex on 1/2/18 was positive for endometrial adenocarcinoma, c/w a recurrent endometrioid type adenocarcinoma, diffusely ER positive (almost 100%), and [place] positive (>90%).\par February 2018 - trial of megace + tamoxifen alternating - developed increased vaginal spotting with Tamoxifen - stopped both meds in March 2018. \par 5/11/18 - 9/7/18  6 cycles of carboplatin + taxol (q 3 week)\par Completed vaginal cuff mass SBRT on 11/14/18. \par 4/10/19 PET with persistent avidity at vaginal cuff and activity suspicious for mets to lymph nodes.   [de-identified] : Returns for follow up.  On keytruda for recurrent endometrial cancer\par She had a few days of dysuria, but it has improved.\par Intermittent vaginal discharge.\par No diarrhea following treatment. Had fatigue for 2 days after treatment\par She continues to have neuropathy in her feet and currently taking Lyrica once a day\par No fever. No nausea/vomiting/constipation.\par \par

## 2019-05-17 NOTE — ADDENDUM
[FreeTextEntry1] : I, Cha Thapa, acted solely as a scribe for Dr. Emperatriz Stanton on this date 5/17/19.

## 2019-05-17 NOTE — ASSESSMENT
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  Developed local recurrence at vaginal apex which progressed through endocrine therapy. \par MRI pelvis 4/16/18 with increase in size of mass 6.2 cm, locally advanced.  Received 6 cycles of Carboplatin + Taxol completed on 9/7/18 with great response however MRI pelvis on 11/16/18 showed enlarging mass to which she received SBRT completed on 12/14/18.   \par 4/10/19 PET with persistent avidity at vaginal cuff and activity suspicious for mets to lymph nodes. She began Keytruda on 4/26/19. \par \par No side effects to keytruda. \par \par Plan: \par Neuropathy: Continue Cymbalta, increase dose to 60 mg qHS\par Continue keytruda, plan for restaging after 4 cycles\par RTO 3 weeks

## 2019-05-17 NOTE — PHYSICAL EXAM
[Obese] : obese [Normal] : affect appropriate [de-identified] : clear [de-identified] : s1/s2 [de-identified] : no edema [de-identified] : obese, non tender, non distended

## 2019-05-17 NOTE — RESULTS/DATA
[FreeTextEntry1] : 4/10/19 PET/CT:\par 1. Persistent, but decreased intensity and extent of circumferential FDG avidity vaginal cuff, compatible with recurrent/residual disease. \par 2. FDG avid right external iliac/obturator lymph node, suspicious for metastatic disease. Mildly FDG avid right inguinal lymph node is indeterminate, possibly metastatic. \par 3. Unchanged non-FDG avid bilateral iliac sclerosis, right greater than left. \par 4. New mildly FDG avid mucoperiosteal thickening right maxillary sinus, probably inflammatory. \par \par 11/16/18 MRI Pelvis:\par Vaginal cuff mass, increased in size (4.2 x 4.3 x 2.7 cm) compared to 9/28/18 when it was nearly resolved, but not as large as 4/16/18 (6.1 x 4.3 x 6.2 cm). The anterior aspect of the mass is inseparable from the bladder wall, therefore bladder wall involvement cannot be excluded\par \par 8/7/18 CT chest/abd/pelvis: No evidence of recurrent or metastatic disease in the thorax, abdomen or pelvis.\par Previously identified enlarged bulky vaginal cuff now appears normal in size.\par \par 4/16/18 MRI pelvis: Vaginal cuff: Enhancing, diffusion restricted mass exophytic from the vaginal cuff with possible calcifications contiguous to, and possibly \par invading,  posterior bladder wall. Mass is also contiguous to the rectum and measures 6.1 x 4.3 x 6.2 cm.\par \par 1/29/18 CT chest/abd/pelvis - \par 3 mm left upper lobe lung nodule. No adenopathy in the thorax, abdomen or pelvis.\par Hysterectomy. Previously identified air containing collection superior to the vaginal cuff has resolved. 2.6 cm bulbous soft tissue area involving \par the left side of the vagina for which recurrent disease should be considered. \par Stable indeterminate left adrenal gland nodule.\par 1.7 cm right lobe thyroid nodule. Correlation with thyroid sonography suggested.\par \par 7/20/2017- PET- very prominent hypermetabolic activity within the uterus. No evidence of metastatic disease identified.

## 2019-05-24 ENCOUNTER — RX RENEWAL (OUTPATIENT)
Age: 69
End: 2019-05-24

## 2019-05-24 ENCOUNTER — TRANSCRIPTION ENCOUNTER (OUTPATIENT)
Age: 69
End: 2019-05-24

## 2019-06-05 ENCOUNTER — OUTPATIENT (OUTPATIENT)
Dept: OUTPATIENT SERVICES | Facility: HOSPITAL | Age: 69
LOS: 1 days | Discharge: HOME | End: 2019-06-05

## 2019-06-05 DIAGNOSIS — T45.1X5A ADVERSE EFFECT OF ANTINEOPLASTIC AND IMMUNOSUPPRESSIVE DRUGS, INITIAL ENCOUNTER: ICD-10-CM

## 2019-06-05 DIAGNOSIS — D50.0 IRON DEFICIENCY ANEMIA SECONDARY TO BLOOD LOSS (CHRONIC): ICD-10-CM

## 2019-06-05 DIAGNOSIS — Z96.649 PRESENCE OF UNSPECIFIED ARTIFICIAL HIP JOINT: Chronic | ICD-10-CM

## 2019-06-05 DIAGNOSIS — Z96.653 PRESENCE OF ARTIFICIAL KNEE JOINT, BILATERAL: Chronic | ICD-10-CM

## 2019-06-05 DIAGNOSIS — D64.81 ANEMIA DUE TO ANTINEOPLASTIC CHEMOTHERAPY: ICD-10-CM

## 2019-06-05 DIAGNOSIS — C54.1 MALIGNANT NEOPLASM OF ENDOMETRIUM: ICD-10-CM

## 2019-06-05 DIAGNOSIS — Z98.42 CATARACT EXTRACTION STATUS, LEFT EYE: Chronic | ICD-10-CM

## 2019-06-07 ENCOUNTER — APPOINTMENT (OUTPATIENT)
Dept: RADIATION ONCOLOGY | Facility: CLINIC | Age: 69
End: 2019-06-07
Payer: MEDICARE

## 2019-06-07 ENCOUNTER — LABORATORY RESULT (OUTPATIENT)
Age: 69
End: 2019-06-07

## 2019-06-07 ENCOUNTER — RESULT REVIEW (OUTPATIENT)
Age: 69
End: 2019-06-07

## 2019-06-07 ENCOUNTER — APPOINTMENT (OUTPATIENT)
Dept: HEMATOLOGY ONCOLOGY | Facility: CLINIC | Age: 69
End: 2019-06-07
Payer: MEDICARE

## 2019-06-07 ENCOUNTER — APPOINTMENT (OUTPATIENT)
Age: 69
End: 2019-06-07

## 2019-06-07 VITALS
TEMPERATURE: 98.5 F | WEIGHT: 238.04 LBS | HEIGHT: 62 IN | SYSTOLIC BLOOD PRESSURE: 158 MMHG | BODY MASS INDEX: 43.8 KG/M2 | OXYGEN SATURATION: 97 % | HEART RATE: 97 BPM | DIASTOLIC BLOOD PRESSURE: 90 MMHG

## 2019-06-07 LAB
BASOPHILS # BLD AUTO: 0.1 K/UL — SIGNIFICANT CHANGE UP (ref 0–0.2)
BASOPHILS NFR BLD AUTO: 1.1 % — SIGNIFICANT CHANGE UP (ref 0–2)
EOSINOPHIL # BLD AUTO: 0.2 K/UL — SIGNIFICANT CHANGE UP (ref 0–0.5)
EOSINOPHIL NFR BLD AUTO: 3.2 % — SIGNIFICANT CHANGE UP (ref 0–6)
HCT VFR BLD CALC: 31.2 % — LOW (ref 34.5–45)
HGB BLD-MCNC: 9.7 G/DL — LOW (ref 11.5–15.5)
LYMPHOCYTES # BLD AUTO: 2.2 K/UL — SIGNIFICANT CHANGE UP (ref 1–3.3)
LYMPHOCYTES # BLD AUTO: 30.9 % — SIGNIFICANT CHANGE UP (ref 13–44)
MCHC RBC-ENTMCNC: 27 PG — SIGNIFICANT CHANGE UP (ref 27–34)
MCHC RBC-ENTMCNC: 31.3 G/DL — LOW (ref 32–36)
MCV RBC AUTO: 86.3 FL — SIGNIFICANT CHANGE UP (ref 80–100)
MONOCYTES # BLD AUTO: 0.6 K/UL — SIGNIFICANT CHANGE UP (ref 0–0.9)
MONOCYTES NFR BLD AUTO: 7.8 % — SIGNIFICANT CHANGE UP (ref 2–14)
NEUTROPHILS # BLD AUTO: 4 K/UL — SIGNIFICANT CHANGE UP (ref 1.8–7.4)
NEUTROPHILS NFR BLD AUTO: 57 % — SIGNIFICANT CHANGE UP (ref 43–77)
PLATELET # BLD AUTO: 206 K/UL — SIGNIFICANT CHANGE UP (ref 150–400)
RBC # BLD: 3.61 M/UL — LOW (ref 3.8–5.2)
RBC # FLD: 13.4 % — SIGNIFICANT CHANGE UP (ref 10.3–14.5)
WBC # BLD: 7.1 K/UL — SIGNIFICANT CHANGE UP (ref 3.8–10.5)
WBC # FLD AUTO: 7.1 K/UL — SIGNIFICANT CHANGE UP (ref 3.8–10.5)

## 2019-06-07 PROCEDURE — 99214 OFFICE O/P EST MOD 30 MIN: CPT

## 2019-06-07 RX ORDER — DULOXETINE HYDROCHLORIDE 30 MG/1
30 CAPSULE, DELAYED RELEASE PELLETS ORAL
Qty: 30 | Refills: 1 | Status: DISCONTINUED | COMMUNITY
Start: 2019-04-19 | End: 2019-06-07

## 2019-06-07 NOTE — HISTORY OF PRESENT ILLNESS
[de-identified] : Ms. SERA JOVEL was diagnosed with endometrial cancer at age 65.\par  She was initially diagnosed with a grade 1 endometrial cancer in 2015 at [facility]. At that time she was seen by Dr. Givens at [place].  She evidently was determined not to be a candidate for an abdominal surgery and a vaginal hysterectomy was recommended. She was taken to the OR for a VH, however, the procedure was aborted due to the "uterus being glued to the colon". She was then treated with whole pelvic RT 5040 cGY completed 10/15/15.  It does not appear that she received any brachytherapy. \par \par In June 2017 she again presented with heavy vaginal bleeding. A mass was noted to be protruding from the vagina and was biopsied showing G1 endometrioid adenocarcinoma. Screening for Encarnacion syndrome with MSI showed loss of expression of MLH1 and PMS2, however MLH promotor methylation was present suggesting a sporadic tumor. She was started on an aromatase inhibitor and referred to Dr. Pascale London for surgical consultation.  She underwent robotic laparoscopic hysterectomy and bilateral salpingo-oophorectomy on 10/12/2017.\par \par Since December 2017, she notes daily vaginal spotting.  She was seen for follow by Dr. London who noted a vaginal apex recurrence per patient.  Biopsy of vaginal apex on 1/2/18 was positive for endometrial adenocarcinoma, c/w a recurrent endometrioid type adenocarcinoma, diffusely ER positive (almost 100%), and LA positive (>90%).\par February 2018 - trial of megace + tamoxifen alternating - developed increased vaginal spotting with Tamoxifen - stopped both meds in March 2018. \par 5/11/18 - 9/7/18  6 cycles of carboplatin + taxol (q 3 week)\par Completed vaginal cuff mass SBRT on 11/14/18. \par 4/10/19 PET with persistent avidity at vaginal cuff and activity suspicious for mets to lymph nodes.   [de-identified] : Returns for follow up.  On keytruda for recurrent endometrial cancer. C3 keytruda today. \par Continues to have intermittent vaginal discharge, but it is watery more often, and a few times she may notice pink discharge\par Fatigue for a few days after treatment. No diarrhea.  No SOB/HIGGINS. \par She did not like duloxetine, she felt that it made her urinate more frequently and also had bladder spasm with it. \par \par \par

## 2019-06-07 NOTE — REASON FOR VISIT
[Routine Follow-Up] : routine follow-up visit for [Family Member] : family member [Endometrial Cancer] : endometrial cancer

## 2019-06-07 NOTE — HISTORY OF PRESENT ILLNESS
[FreeTextEntry1] : 68 year old female returns today s/p 2,400cGy SBRT to the vaginal cuff for a [id number] lllA recurrent endometrial adenocarcinoma completed 12/14/18. She initially underwent primary pelvic external beam radiation to 50.4 Gy completing 10/2015, with recurrence in 6/2017 for which she underwent RaTLH/BSO in 10/2017, and developed recurrence in the vaginal cuff in 1/2018.\par She is s/p carbo-taxol with Dr. Stanton whom she followed with on 4/19/19 and began Keytruda which is ongoing,  saw Dr. Boyce  and does not have an appointment with Dr. London.\par \par PET/CT 4/10/19:\par 1. Persistent, but decreased intensity and extent of circumferential FDG avidity vaginal cuff, compatible with recurrent/residual disease.\par 2. FDG avid right external iliac/obturator lymph node, suspicious for metastatic disease. Mildly FDG avid right inguinal lymph node is \par indeterminate, possibly metastatic. \par 3. Unchanged non-FDG avid bilateral iliac sclerosis, right greater than left. \par 4. New mildly FDG avid mucoperiosteal thickening right maxillary sinus, probably inflammatory.\par \par She was started on pembrolizumab, and reports tolerating it well thus far.  Notes some fatigue with treatments.  Continues to have watery brown discharge with some tinges of blood at times.\par Denies urinary stress incontinence, vaginal itch, local edema, change in bowel, pelvic pain, or unintentional weight loss.

## 2019-06-07 NOTE — PHYSICAL EXAM
[Normal] : no joint swelling, no clubbing or cyanosis of the fingernails and muscle strength and tone were normal [de-identified] : Examination performed in the presence of a female chaperone.  Tumor noted on left>right vaginal cuff

## 2019-06-07 NOTE — REVIEW OF SYSTEMS
[Fatigue] : fatigue [Vaginal Discharge] : vaginal discharge [Negative] : Allergic/Immunologic [Constipation: Grade 0] : Constipation: Grade 0 [Diarrhea: Grade 0] : Diarrhea: Grade 0 [Proctitis: Grade 0] : Proctitis: Grade 0 [Edema Limbs: Grade 0] : Edema Limbs: Grade 0  [Fatigue: Grade 1 - Fatigue relieved by rest] : Fatigue: Grade 1 - Fatigue relieved by rest [Neck Edema: Grade 0] : Neck Edema: Grade 0 [Localized Edema: Grade 0] : Localized Edema: Grade 0  [Urinary Incontinence: Grade 0] : Urinary Incontinence: Grade 0  [Dyspareunia: Grade 0] : Dyspareunia: Grade 0 [Skin Hyperpigmentation: Grade 0] : Skin Hyperpigmentation: Grade 0 [Dermatitis Radiation: Grade 0] : Dermatitis Radiation: Grade 0

## 2019-06-07 NOTE — DISEASE MANAGEMENT
[Clinical] : TNM Stage: c [IIIA] : IIIA [TTNM] : 3a [FreeTextEntry4] : recurrent adenocarcinoma endometrium [NTNM] : x [MTNM] : 0

## 2019-06-07 NOTE — VITALS
[Maximal Pain Intensity: 0/10] : 0/10 [80: Normal activity with effort; some signs or symptoms of disease.] : 80: Normal activity with effort; some signs or symptoms of disease.

## 2019-06-07 NOTE — PHYSICAL EXAM
[Obese] : obese [Normal] : affect appropriate [de-identified] : clear [de-identified] : s1/s2 [de-identified] : obese, non tender, non distended [de-identified] : no edema

## 2019-06-07 NOTE — ASSESSMENT
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  Developed local recurrence at vaginal apex which progressed through endocrine therapy. \par MRI pelvis 4/16/18 with increase in size of mass 6.2 cm, locally advanced.  Received 6 cycles of Carboplatin + Taxol completed on 9/7/18 with great response however MRI pelvis on 11/16/18 showed enlarging mass to which she received SBRT completed on 12/14/18.   \par \par 4/10/19 PET with persistent avidity at vaginal cuff and activity suspicious for mets to lymph nodes. She began Keytruda on 4/26/19. \par \par C4 keytruda today, no toxicity.\par \par Plan: \par Neuropathy: did not like duloxetine, now back on gabapentin 300 BID\par Plan for restaging in 7/2019, with follow up after\par RTO 6 weeks

## 2019-06-10 DIAGNOSIS — Z51.11 ENCOUNTER FOR ANTINEOPLASTIC CHEMOTHERAPY: ICD-10-CM

## 2019-06-28 ENCOUNTER — RESULT REVIEW (OUTPATIENT)
Age: 69
End: 2019-06-28

## 2019-06-28 ENCOUNTER — LABORATORY RESULT (OUTPATIENT)
Age: 69
End: 2019-06-28

## 2019-06-28 ENCOUNTER — APPOINTMENT (OUTPATIENT)
Age: 69
End: 2019-06-28

## 2019-06-28 LAB
BASOPHILS # BLD AUTO: 0.1 K/UL — SIGNIFICANT CHANGE UP (ref 0–0.2)
BASOPHILS NFR BLD AUTO: 0.8 % — SIGNIFICANT CHANGE UP (ref 0–2)
EOSINOPHIL # BLD AUTO: 0.2 K/UL — SIGNIFICANT CHANGE UP (ref 0–0.5)
EOSINOPHIL NFR BLD AUTO: 2.7 % — SIGNIFICANT CHANGE UP (ref 0–6)
HCT VFR BLD CALC: 29.8 % — LOW (ref 34.5–45)
HGB BLD-MCNC: 9.4 G/DL — LOW (ref 11.5–15.5)
LYMPHOCYTES # BLD AUTO: 2.1 K/UL — SIGNIFICANT CHANGE UP (ref 1–3.3)
LYMPHOCYTES # BLD AUTO: 29.7 % — SIGNIFICANT CHANGE UP (ref 13–44)
MCHC RBC-ENTMCNC: 27.5 PG — SIGNIFICANT CHANGE UP (ref 27–34)
MCHC RBC-ENTMCNC: 31.6 G/DL — LOW (ref 32–36)
MCV RBC AUTO: 86.9 FL — SIGNIFICANT CHANGE UP (ref 80–100)
MONOCYTES # BLD AUTO: 0.7 K/UL — SIGNIFICANT CHANGE UP (ref 0–0.9)
MONOCYTES NFR BLD AUTO: 10.1 % — SIGNIFICANT CHANGE UP (ref 2–14)
NEUTROPHILS # BLD AUTO: 4 K/UL — SIGNIFICANT CHANGE UP (ref 1.8–7.4)
NEUTROPHILS NFR BLD AUTO: 56.7 % — SIGNIFICANT CHANGE UP (ref 43–77)
PLATELET # BLD AUTO: 191 K/UL — SIGNIFICANT CHANGE UP (ref 150–400)
RBC # BLD: 3.44 M/UL — LOW (ref 3.8–5.2)
RBC # FLD: 13.9 % — SIGNIFICANT CHANGE UP (ref 10.3–14.5)
WBC # BLD: 7.1 K/UL — SIGNIFICANT CHANGE UP (ref 3.8–10.5)
WBC # FLD AUTO: 7.1 K/UL — SIGNIFICANT CHANGE UP (ref 3.8–10.5)

## 2019-06-29 ENCOUNTER — APPOINTMENT (OUTPATIENT)
Dept: CT IMAGING | Facility: CLINIC | Age: 69
End: 2019-06-29
Payer: MEDICARE

## 2019-07-01 ENCOUNTER — FORM ENCOUNTER (OUTPATIENT)
Age: 69
End: 2019-07-01

## 2019-07-02 ENCOUNTER — OUTPATIENT (OUTPATIENT)
Dept: OUTPATIENT SERVICES | Facility: HOSPITAL | Age: 69
LOS: 1 days | End: 2019-07-02
Payer: MEDICARE

## 2019-07-02 ENCOUNTER — APPOINTMENT (OUTPATIENT)
Dept: CT IMAGING | Facility: CLINIC | Age: 69
End: 2019-07-02
Payer: MEDICARE

## 2019-07-02 DIAGNOSIS — C54.1 MALIGNANT NEOPLASM OF ENDOMETRIUM: ICD-10-CM

## 2019-07-02 DIAGNOSIS — Z96.649 PRESENCE OF UNSPECIFIED ARTIFICIAL HIP JOINT: Chronic | ICD-10-CM

## 2019-07-02 DIAGNOSIS — Z96.653 PRESENCE OF ARTIFICIAL KNEE JOINT, BILATERAL: Chronic | ICD-10-CM

## 2019-07-02 DIAGNOSIS — Z98.42 CATARACT EXTRACTION STATUS, LEFT EYE: Chronic | ICD-10-CM

## 2019-07-02 PROCEDURE — 74177 CT ABD & PELVIS W/CONTRAST: CPT | Mod: 26

## 2019-07-02 PROCEDURE — 74177 CT ABD & PELVIS W/CONTRAST: CPT

## 2019-07-02 PROCEDURE — 71260 CT THORAX DX C+: CPT | Mod: 26

## 2019-07-02 PROCEDURE — 71260 CT THORAX DX C+: CPT

## 2019-07-15 ENCOUNTER — OUTPATIENT (OUTPATIENT)
Dept: OUTPATIENT SERVICES | Facility: HOSPITAL | Age: 69
LOS: 1 days | Discharge: HOME | End: 2019-07-15

## 2019-07-15 DIAGNOSIS — C54.1 MALIGNANT NEOPLASM OF ENDOMETRIUM: ICD-10-CM

## 2019-07-15 DIAGNOSIS — Z96.653 PRESENCE OF ARTIFICIAL KNEE JOINT, BILATERAL: Chronic | ICD-10-CM

## 2019-07-15 DIAGNOSIS — Z96.649 PRESENCE OF UNSPECIFIED ARTIFICIAL HIP JOINT: Chronic | ICD-10-CM

## 2019-07-15 DIAGNOSIS — D64.81 ANEMIA DUE TO ANTINEOPLASTIC CHEMOTHERAPY: ICD-10-CM

## 2019-07-15 DIAGNOSIS — D50.0 IRON DEFICIENCY ANEMIA SECONDARY TO BLOOD LOSS (CHRONIC): ICD-10-CM

## 2019-07-15 DIAGNOSIS — Z98.42 CATARACT EXTRACTION STATUS, LEFT EYE: Chronic | ICD-10-CM

## 2019-07-15 DIAGNOSIS — T45.1X5A ADVERSE EFFECT OF ANTINEOPLASTIC AND IMMUNOSUPPRESSIVE DRUGS, INITIAL ENCOUNTER: ICD-10-CM

## 2019-07-19 ENCOUNTER — LABORATORY RESULT (OUTPATIENT)
Age: 69
End: 2019-07-19

## 2019-07-19 ENCOUNTER — RESULT REVIEW (OUTPATIENT)
Age: 69
End: 2019-07-19

## 2019-07-19 ENCOUNTER — APPOINTMENT (OUTPATIENT)
Dept: HEMATOLOGY ONCOLOGY | Facility: CLINIC | Age: 69
End: 2019-07-19
Payer: MEDICARE

## 2019-07-19 ENCOUNTER — APPOINTMENT (OUTPATIENT)
Age: 69
End: 2019-07-19

## 2019-07-19 VITALS
BODY MASS INDEX: 43.99 KG/M2 | TEMPERATURE: 99.1 F | OXYGEN SATURATION: 95 % | DIASTOLIC BLOOD PRESSURE: 83 MMHG | WEIGHT: 239.06 LBS | SYSTOLIC BLOOD PRESSURE: 143 MMHG | HEART RATE: 105 BPM | HEIGHT: 62 IN

## 2019-07-19 LAB
BASOPHILS # BLD AUTO: 0.1 K/UL — SIGNIFICANT CHANGE UP (ref 0–0.2)
BASOPHILS NFR BLD AUTO: 1 % — SIGNIFICANT CHANGE UP (ref 0–2)
EOSINOPHIL # BLD AUTO: 0.2 K/UL — SIGNIFICANT CHANGE UP (ref 0–0.5)
EOSINOPHIL NFR BLD AUTO: 2.4 % — SIGNIFICANT CHANGE UP (ref 0–6)
HCT VFR BLD CALC: 32.2 % — LOW (ref 34.5–45)
HGB BLD-MCNC: 9.9 G/DL — LOW (ref 11.5–15.5)
LYMPHOCYTES # BLD AUTO: 1.8 K/UL — SIGNIFICANT CHANGE UP (ref 1–3.3)
LYMPHOCYTES # BLD AUTO: 28 % — SIGNIFICANT CHANGE UP (ref 13–44)
MCHC RBC-ENTMCNC: 27 PG — SIGNIFICANT CHANGE UP (ref 27–34)
MCHC RBC-ENTMCNC: 30.8 G/DL — LOW (ref 32–36)
MCV RBC AUTO: 87.7 FL — SIGNIFICANT CHANGE UP (ref 80–100)
MONOCYTES # BLD AUTO: 0.6 K/UL — SIGNIFICANT CHANGE UP (ref 0–0.9)
MONOCYTES NFR BLD AUTO: 8.6 % — SIGNIFICANT CHANGE UP (ref 2–14)
NEUTROPHILS # BLD AUTO: 4 K/UL — SIGNIFICANT CHANGE UP (ref 1.8–7.4)
NEUTROPHILS NFR BLD AUTO: 60.1 % — SIGNIFICANT CHANGE UP (ref 43–77)
PLATELET # BLD AUTO: 192 K/UL — SIGNIFICANT CHANGE UP (ref 150–400)
RBC # BLD: 3.67 M/UL — LOW (ref 3.8–5.2)
RBC # FLD: 14.5 % — SIGNIFICANT CHANGE UP (ref 10.3–14.5)
WBC # BLD: 6.6 K/UL — SIGNIFICANT CHANGE UP (ref 3.8–10.5)
WBC # FLD AUTO: 6.6 K/UL — SIGNIFICANT CHANGE UP (ref 3.8–10.5)

## 2019-07-19 PROCEDURE — 99214 OFFICE O/P EST MOD 30 MIN: CPT

## 2019-08-09 ENCOUNTER — APPOINTMENT (OUTPATIENT)
Age: 69
End: 2019-08-09

## 2019-08-09 ENCOUNTER — LABORATORY RESULT (OUTPATIENT)
Age: 69
End: 2019-08-09

## 2019-08-09 ENCOUNTER — APPOINTMENT (OUTPATIENT)
Dept: HEMATOLOGY ONCOLOGY | Facility: CLINIC | Age: 69
End: 2019-08-09
Payer: MEDICARE

## 2019-08-09 ENCOUNTER — RESULT REVIEW (OUTPATIENT)
Age: 69
End: 2019-08-09

## 2019-08-09 LAB
BASOPHILS # BLD AUTO: 0.1 K/UL — SIGNIFICANT CHANGE UP (ref 0–0.2)
BASOPHILS NFR BLD AUTO: 1.1 % — SIGNIFICANT CHANGE UP (ref 0–2)
EOSINOPHIL # BLD AUTO: 0.2 K/UL — SIGNIFICANT CHANGE UP (ref 0–0.5)
EOSINOPHIL NFR BLD AUTO: 2.2 % — SIGNIFICANT CHANGE UP (ref 0–6)
HCT VFR BLD CALC: 30.8 % — LOW (ref 34.5–45)
HGB BLD-MCNC: 10.1 G/DL — LOW (ref 11.5–15.5)
LYMPHOCYTES # BLD AUTO: 1.9 K/UL — SIGNIFICANT CHANGE UP (ref 1–3.3)
LYMPHOCYTES # BLD AUTO: 27.5 % — SIGNIFICANT CHANGE UP (ref 13–44)
MCHC RBC-ENTMCNC: 28.1 PG — SIGNIFICANT CHANGE UP (ref 27–34)
MCHC RBC-ENTMCNC: 32.9 G/DL — SIGNIFICANT CHANGE UP (ref 32–36)
MCV RBC AUTO: 85.6 FL — SIGNIFICANT CHANGE UP (ref 80–100)
MONOCYTES # BLD AUTO: 0.5 K/UL — SIGNIFICANT CHANGE UP (ref 0–0.9)
MONOCYTES NFR BLD AUTO: 8 % — SIGNIFICANT CHANGE UP (ref 2–14)
NEUTROPHILS # BLD AUTO: 4.1 K/UL — SIGNIFICANT CHANGE UP (ref 1.8–7.4)
NEUTROPHILS NFR BLD AUTO: 61.2 % — SIGNIFICANT CHANGE UP (ref 43–77)
PLATELET # BLD AUTO: 207 K/UL — SIGNIFICANT CHANGE UP (ref 150–400)
RBC # BLD: 3.6 M/UL — LOW (ref 3.8–5.2)
RBC # FLD: 13.4 % — SIGNIFICANT CHANGE UP (ref 10.3–14.5)
WBC # BLD: 6.8 K/UL — SIGNIFICANT CHANGE UP (ref 3.8–10.5)
WBC # FLD AUTO: 6.8 K/UL — SIGNIFICANT CHANGE UP (ref 3.8–10.5)

## 2019-08-09 PROCEDURE — 99214 OFFICE O/P EST MOD 30 MIN: CPT

## 2019-08-09 NOTE — HISTORY OF PRESENT ILLNESS
[de-identified] : Ms. SERA JOVEL was diagnosed with endometrial cancer at age 65.\par  She was initially diagnosed with a grade 1 endometrial cancer in 2015 at [facility]. At that time she was seen by Dr. Givens at [place].  She evidently was determined not to be a candidate for an abdominal surgery and a vaginal hysterectomy was recommended. She was taken to the OR for a VH, however, the procedure was aborted due to the "uterus being glued to the colon". She was then treated with whole pelvic RT 5040 cGY completed 10/15/15.  It does not appear that she received any brachytherapy. \par \par In June 2017 she again presented with heavy vaginal bleeding. A mass was noted to be protruding from the vagina and was biopsied showing G1 endometrioid adenocarcinoma. Screening for Encarnacion syndrome with MSI showed loss of expression of MLH1 and PMS2, however MLH promotor methylation was present suggesting a sporadic tumor. She was started on an aromatase inhibitor and referred to Dr. Pascale London for surgical consultation.  She underwent robotic laparoscopic hysterectomy and bilateral salpingo-oophorectomy on 10/12/2017.\par \par Since December 2017, she notes daily vaginal spotting.  She was seen for follow by Dr. London who noted a vaginal apex recurrence per patient.  Biopsy of vaginal apex on 1/2/18 was positive for endometrial adenocarcinoma, c/w a recurrent endometrioid type adenocarcinoma, diffusely ER positive (almost 100%), and RI positive (>90%).\par February 2018 - trial of megace + tamoxifen alternating - developed increased vaginal spotting with Tamoxifen - stopped both meds in March 2018. \par 5/11/18 - 9/7/18  6 cycles of carboplatin + taxol (q 3 week)\par Completed vaginal cuff mass SBRT on 11/14/18. \par 4/10/19 PET with persistent avidity at vaginal cuff and activity suspicious for mets to lymph nodes.   [de-identified] : Here today for C6 Keytruda. Since last seen, she has c/o increased fatigue. She continues to have bladder discomfort w/ voiding, she "1 sec. of sharp pain, and said Dr. Stanton felt it was bladder spasms. Denies fevers, SOB w/ exertion,, no CP, appetite has increased, does not feel like she has gained weight. No constipation or diarrhea, no bladder discomfort w/ voiding, no burning. Scant pink discharge on pads, no brit blood. Swelling in LE edema, if dependent, resolves w/ elevation, no calf pain.. Energy level is fair. The remainder of ROS is negative.\par \par

## 2019-08-09 NOTE — ASSESSMENT
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  Developed local recurrence at vaginal apex which progressed through endocrine therapy. \par MRI pelvis 4/16/18 with increase in size of mass 6.2 cm, locally advanced.  Received 6 cycles of Carboplatin + Taxol completed on 9/7/18 with great response however MRI pelvis on 11/16/18 showed enlarging mass to which she received SBRT completed on 12/14/18.  4/10/19 PET with persistent avidity at vaginal cuff and activity suspicious for mets to lymph nodes. She began Keytruda on 4/26/19. CT on 7/02/19 shows no evidence of metastatic disease in the thorax. Bulky appearing vaginal cuff is unchanged in appearance. Patient has known \par FDG avid disease on PET/CT scan dated 4/10/2019 in this region but the tumor cannot be discretely measured on the CT images. Interval development of mild left hydroureteronephrosis likely from compression from the vaginal cuff mass. Increase in size of the right sided pelvic mass. Dr. Stanton aware and plan is to continue Keytruda for now, and repeat scans in October 2019.\par \par C6 Keytruda today, no toxicity.\par \par Plan: \par Continue Keytruda every 3 weeks.\par Neuropathy: on gabapentin 300 BID\par Bladder spasms: recent urine cx negative. Likely secondary radiation effect. Rx for Pyridium 100 mg twice daily.\par RTO 6 weeks

## 2019-08-09 NOTE — PHYSICAL EXAM
[Obese] : obese [Ambulatory and capable of all self care but unable to carry out any work activities] : Status 2- Ambulatory and capable of all self care but unable to carry out any work activities. Up and about more than 50% of waking hours [Normal] : grossly intact [de-identified] : negative cervical, supra/infraclavicular adenopathy. [de-identified] : Interactive, well groomed, in NAD. [de-identified] : clear [de-identified] : S1S2 [de-identified] : no edema [de-identified] : BS+, soft, nontender on palpation. [de-identified] : without spinal or cva tenderness.

## 2019-08-12 DIAGNOSIS — Z51.11 ENCOUNTER FOR ANTINEOPLASTIC CHEMOTHERAPY: ICD-10-CM

## 2019-08-21 PROBLEM — R39.9 UTI SYMPTOMS: Status: ACTIVE | Noted: 2019-01-01

## 2019-09-22 NOTE — RESULTS/DATA
[FreeTextEntry1] : 7/02/19 CT C/A/P \par IMPRESSION: \par \par No evidence of metastatic disease in the thorax. \par \par Bulky appearing vaginal cuff is unchanged in appearance. Patient has known \par FDG avid disease on PET/CT scan dated 4/10/2019 in this region but the tumor \par cannot be discretely measured on the CT images. \par \par Interval development of mild left hydroureteronephrosis likely from \par compression from the vaginal cuff mass. \par \par Increase in size of the right sided pelvic mass. \par

## 2019-09-22 NOTE — PHYSICAL EXAM
[Obese] : obese [Normal] : affect appropriate [de-identified] : clear [de-identified] : s1/s2 sl tachy rate [de-identified] : no edema [de-identified] : without spinal or cva tenderness. [de-identified] : BS+, soft, nontender on palpation.

## 2019-09-22 NOTE — ASSESSMENT
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  Developed local recurrence at vaginal apex which progressed through endocrine therapy. \par MRI pelvis 4/16/18 with increase in size of mass 6.2 cm, locally advanced.  Received 6 cycles of Carboplatin + Taxol completed on 9/7/18 with great response however MRI pelvis on 11/16/18 showed enlarging mass to which she received SBRT completed on 12/14/18.  4/10/19 PET with persistent avidity at vaginal cuff and activity suspicious for mets to lymph nodes. She began Keytruda on 4/26/19.  7/03/19 CT C/A/P shows no evidence of metastatic disease in the thorax. Bulky appearing vaginal cuff is unchanged in appearance. Patient has known \par FDG avid disease on PET/CT scan dated 4/10/2019 in this region but the tumor cannot be discretely measured on the CT images. Interval development of mild left hydroureteronephrosis likely from compression from the vaginal cuff mass. Increase in size of the right sided pelvic mass. \par \par Reviewed results of CT w/ Dr. Stanton, though CT shows increase in size of R sided pelvic mass, the remainder areas of disease are stable, and recommendation is to proceed w/ Keytruda and restage in October '19.\par \par \par S/P 4 cycles of Keytruda w/o toxicity. Vague c/o bladder spasms.\par \par Plan: \par - Proceed w/ C5 Keytruda\par - Send U/A and C&S.\par - F/u w/ Dr. Stanton on 8/09/19.

## 2019-09-22 NOTE — HISTORY OF PRESENT ILLNESS
[de-identified] : Ms. SERA JOVEL was diagnosed with endometrial cancer at age 65.\par  She was initially diagnosed with a grade 1 endometrial cancer in 2015 at [facility]. At that time she was seen by Dr. Givens at [place].  She evidently was determined not to be a candidate for an abdominal surgery and a vaginal hysterectomy was recommended. She was taken to the OR for a VH, however, the procedure was aborted due to the "uterus being glued to the colon". She was then treated with whole pelvic RT 5040 cGY completed 10/15/15.  It does not appear that she received any brachytherapy. \par \par In June 2017 she again presented with heavy vaginal bleeding. A mass was noted to be protruding from the vagina and was biopsied showing G1 endometrioid adenocarcinoma. Screening for Encarnacion syndrome with MSI showed loss of expression of MLH1 and PMS2, however MLH promotor methylation was present suggesting a sporadic tumor. She was started on an aromatase inhibitor and referred to Dr. Pascale London for surgical consultation.  She underwent robotic laparoscopic hysterectomy and bilateral salpingo-oophorectomy on 10/12/2017.\par \par Since December 2017, she notes daily vaginal spotting.  She was seen for follow by Dr. London who noted a vaginal apex recurrence per patient.  Biopsy of vaginal apex on 1/2/18 was positive for endometrial adenocarcinoma, c/w a recurrent endometrioid type adenocarcinoma, diffusely ER positive (almost 100%), and VA positive (>90%).\par February 2018 - trial of megace + tamoxifen alternating - developed increased vaginal spotting with Tamoxifen - stopped both meds in March 2018. \par 5/11/18 - 9/7/18  6 cycles of carboplatin + taxol (q 3 week)\par Completed vaginal cuff mass SBRT on 11/14/18. \par 4/10/19 PET with persistent avidity at vaginal cuff and activity suspicious for mets to lymph nodes.   [de-identified] : Here to discuss CT scans. Denies fevers, no SOB, no CP, appetite is good, no constipation or diarrhea, no pain/burning w/ urination, but has new symptoms of urinary leakage. Daughter feels she is refraining from fluids due to not wanting to void more often. No LE edema. Energy level is fair. The remainder of ROS is negative.

## 2019-09-23 NOTE — LETTER BODY
[Dear  ___] : Dear  [unfilled], [Courtesy Letter:] : I had the pleasure of seeing your patient, [unfilled], in my office today. [Please see my note below.] : Please see my note below. [Sincerely,] : Sincerely, [FreeTextEntry3] : Ed\par \par Shawn Roberts MD\par [facility]\par  of Urology\par [employer] [facility]\par

## 2019-09-23 NOTE — HISTORY OF PRESENT ILLNESS
[FreeTextEntry1] : Pt noticed blood in urine and clots x 1 week. She had recent Urine culture which was negative. Pt has hx of endometrial Cancer and had chemo end of 2018 and radiation given in 2015 and earlier 2019.  Pt's recent CT scan showed mild left ureterohydronephrosis upto level of vaginal cuff. \par Pt reports over past month she noticed lower abdominal/vaginal pain toward end of her urination. She denies any dysuria. Pt does also recently noticed increased frequency. She gets up 4-5x a night. She has to wear ~4 pads daily. \par

## 2019-09-23 NOTE — ASSESSMENT
[FreeTextEntry1] : Impression:\par discussed optionsl\par Gross hematuria due to either XRT cystitis, secondary tumor, primary tumor, stone other etiology\par \par Plan:\par \par since she has a CT scan coming up in a few days we will schedule a cysto in a week\par \par \par

## 2019-10-09 NOTE — PHYSICAL EXAM
[Ambulatory and capable of all self care but unable to carry out any work activities] : Status 2- Ambulatory and capable of all self care but unable to carry out any work activities. Up and about more than 50% of waking hours [Obese] : obese [Normal] : grossly intact [de-identified] : Interactive, well groomed, in NAD. [de-identified] : negative cervical, supra/infraclavicular adenopathy. [de-identified] : clear [de-identified] : S1S2 sl tachy rate. [de-identified] : negative pedal edema or calve tenderness [de-identified] : BS+, soft, nontender on palpation. [de-identified] : without spinal or cva tenderness.

## 2019-10-09 NOTE — ASSESSMENT
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  Developed local recurrence at vaginal apex which progressed through endocrine therapy. \par MRI pelvis 4/16/18 with increase in size of mass 6.2 cm, locally advanced.  Received 6 cycles of Carboplatin + Taxol completed on 9/7/18 with great response however MRI pelvis on 11/16/18 showed enlarging mass to which she received SBRT completed on 12/14/18.  4/10/19 PET with persistent avidity at vaginal cuff and activity suspicious for mets to lymph nodes. She began Keytruda on 4/26/19. CT on 7/02/19 shows no evidence of metastatic disease in the thorax. Bulky appearing vaginal cuff is unchanged in appearance. Patient has known \par FDG avid disease on PET/CT scan dated 4/10/2019 in this region but the tumor cannot be discretely measured on the CT images. Interval development of mild left hydroureteronephrosis likely from compression from the vaginal cuff mass. Increase in size of the right sided pelvic mass. Dr. Stanton aware and plan is to continue Keytruda for now, and repeat scans in October 2019.\par \par C/o blood in bowel post urination, past urinalysis revealed + leukocyte esterase, and normal urogenital juan. Discussed w/ Ms. Paige and her daughter the concern of progression vs RT s/e. Otherwise she is feeling well, and hgb is stable.\par \par Plan: \par - Proceed w/ Keytruda today.\par - Restage w/ CT C/A/P \par - Urology consult to evaluate bladder. U/A C&S today.\par - F/u w/ Dr. Thompson\par - F/u w/ Pascale London\par - F/u post imaging

## 2019-10-09 NOTE — HISTORY OF PRESENT ILLNESS
[de-identified] : Ms. SERA JOVEL was diagnosed with endometrial cancer at age 65.\par  She was initially diagnosed with a grade 1 endometrial cancer in 2015 at [facility]. At that time she was seen by Dr. Givens at [place].  She evidently was determined not to be a candidate for an abdominal surgery and a vaginal hysterectomy was recommended. She was taken to the OR for a VH, however, the procedure was aborted due to the "uterus being glued to the colon". She was then treated with whole pelvic RT 5040 cGY completed 10/15/15.  It does not appear that she received any brachytherapy. \par \par In June 2017 she again presented with heavy vaginal bleeding. A mass was noted to be protruding from the vagina and was biopsied showing G1 endometrioid adenocarcinoma. Screening for Encarnacion syndrome with MSI showed loss of expression of MLH1 and PMS2, however MLH promotor methylation was present suggesting a sporadic tumor. She was started on an aromatase inhibitor and referred to Dr. Pascale London for surgical consultation.  She underwent robotic laparoscopic hysterectomy and bilateral salpingo-oophorectomy on 10/12/2017.\par \par Since December 2017, she notes daily vaginal spotting.  She was seen for follow by Dr. London who noted a vaginal apex recurrence per patient.  Biopsy of vaginal apex on 1/2/18 was positive for endometrial adenocarcinoma, c/w a recurrent endometrioid type adenocarcinoma, diffusely ER positive (almost 100%), and UT positive (>90%).\par February 2018 - trial of megace + tamoxifen alternating - developed increased vaginal spotting with Tamoxifen - stopped both meds in March 2018. \par 5/11/18 - 9/7/18  6 cycles of carboplatin + taxol (q 3 week)\par Completed vaginal cuff mass SBRT on 11/14/18. \par 4/10/19 PET with persistent avidity at vaginal cuff and activity suspicious for mets to lymph nodes.   [de-identified] : Here for evaluation of C8 Keytruda. Persistent c/o urinary spasms and now reports more persistent RBCs, which she feels is r/t urinary stream and not sure this is vaginal discharge. Low grade fevers, no SOB, no CP, appetite is good, no constipation or diarrhea, No LE edema. Energy level is fair.. The remainder of ROS is negative.\par

## 2019-10-14 NOTE — PHYSICAL EXAM
[Ambulatory and capable of all self care but unable to carry out any work activities] : Status 2- Ambulatory and capable of all self care but unable to carry out any work activities. Up and about more than 50% of waking hours [Obese] : obese [Normal] : affect appropriate [de-identified] : Interactive, well groomed, in NAD. [de-identified] : negative cervical, supra/infraclavicular adenopathy. [de-identified] : clear [de-identified] : S1S2 sl tachy rate. [de-identified] : negative pedal edema or calve tenderness [de-identified] : BS+, soft, nontender on palpation. [de-identified] : without spinal or cva tenderness.

## 2019-10-14 NOTE — HISTORY OF PRESENT ILLNESS
[de-identified] : Ms. SERA JOVEL was diagnosed with endometrial cancer at age 65.\par  She was initially diagnosed with a grade 1 endometrial cancer in 2015 at [facility]. At that time she was seen by Dr. Givens at [place].  She evidently was determined not to be a candidate for an abdominal surgery and a vaginal hysterectomy was recommended. She was taken to the OR for a VH, however, the procedure was aborted due to the "uterus being glued to the colon". She was then treated with whole pelvic RT 5040 cGY completed 10/15/15.  It does not appear that she received any brachytherapy. \par \par In June 2017 she again presented with heavy vaginal bleeding. A mass was noted to be protruding from the vagina and was biopsied showing G1 endometrioid adenocarcinoma. Screening for Encarnacion syndrome with MSI showed loss of expression of MLH1 and PMS2, however MLH promotor methylation was present suggesting a sporadic tumor. She was started on an aromatase inhibitor and referred to Dr. Pascale London for surgical consultation.  She underwent robotic laparoscopic hysterectomy and bilateral salpingo-oophorectomy on 10/12/2017.\par \par Since December 2017, she notes daily vaginal spotting.  She was seen for follow by Dr. London who noted a vaginal apex recurrence per patient.  Biopsy of vaginal apex on 1/2/18 was positive for endometrial adenocarcinoma, c/w a recurrent endometrioid type adenocarcinoma, diffusely ER positive (almost 100%), and UT positive (>90%).\par February 2018 - trial of megace + tamoxifen alternating - developed increased vaginal spotting with Tamoxifen - stopped both meds in March 2018. \par 5/11/18 - 9/7/18  6 cycles of carboplatin + taxol (q 3 week)\par Completed vaginal cuff mass SBRT on 11/14/18. \par 4/10/19 PET with persistent avidity at vaginal cuff and activity suspicious for mets to lymph nodes.   [de-identified] : Here for evaluation of C8 Keytruda. Persistent c/o urinary spasms and now reports more persistent RBCs, which she feels is r/t urinary stream and not sure this is vaginal discharge. Low grade fevers, no SOB, no CP, appetite is good, no constipation or diarrhea, No LE edema. Energy level is fair.. The remainder of ROS is negative.\par Patient presented to [facility] this weekend. She is feeling better since hospital visit. Leaking in pads.

## 2019-10-17 NOTE — PHYSICAL EXAM
[General Appearance - Well Developed] : well developed [General Appearance - Well Nourished] : well nourished [Normal Appearance] : normal appearance [General Appearance - In No Acute Distress] : no acute distress [Well Groomed] : well groomed [] : no respiratory distress [Respiration, Rhythm And Depth] : normal respiratory rhythm and effort [Exaggerated Use Of Accessory Muscles For Inspiration] : no accessory muscle use [Mood] : the mood was normal [Oriented To Time, Place, And Person] : oriented to person, place, and time [Affect] : the affect was normal [Not Anxious] : not anxious

## 2019-10-17 NOTE — HISTORY OF PRESENT ILLNESS
[FreeTextEntry1] : Patient with known tumor.  Possibilities include either direct extension of GYN malignancy or primary bladder malignancy.  This could occur as a secondary tumor from XRT.  no weight loss or flank pain. minima hematuria currently.

## 2019-10-17 NOTE — ASSESSMENT
[FreeTextEntry1] : Impression:\par \par bladder tumor\par gross hematuria due to above.\par \par I have discussed the situation with them and offered palliative resection vs. observation since hematuria is now minimal and patient has non-resectable in the pelvis.\par They will think about this and let me know how they wish to proceed.

## 2019-10-17 NOTE — PHYSICAL EXAM
[General Appearance - Well Developed] : well developed [General Appearance - Well Nourished] : well nourished [Normal Appearance] : normal appearance [Well Groomed] : well groomed [General Appearance - In No Acute Distress] : no acute distress [] : no respiratory distress [Respiration, Rhythm And Depth] : normal respiratory rhythm and effort [Exaggerated Use Of Accessory Muscles For Inspiration] : no accessory muscle use [Affect] : the affect was normal [Mood] : the mood was normal [Oriented To Time, Place, And Person] : oriented to person, place, and time [Not Anxious] : not anxious

## 2019-10-17 NOTE — LETTER BODY
[Dear  ___] : Dear  [unfilled], [Courtesy Letter:] : I had the pleasure of seeing your patient, [unfilled], in my office today. [Please see my note below.] : Please see my note below. [FreeTextEntry3] : Ed\par \par Shawn Roberts MD\par [facility]\par  of Urology\par [employer] [facility]\par  [Sincerely,] : Sincerely, [DrYahir  ___] : Dr. FINK

## 2019-10-17 NOTE — LETTER BODY
[Courtesy Letter:] : I had the pleasure of seeing your patient, [unfilled], in my office today. [Please see my note below.] : Please see my note below. [Dear  ___] : Dear  [unfilled], [Sincerely,] : Sincerely, [FreeTextEntry3] : Ed\par \par Shawn Roberts MD\par [facility]\par  of Urology\par [employer] at [facility]\par  [DrYahir  ___] : Dr. FINK

## 2019-10-22 NOTE — PHYSICAL EXAM
[Ambulatory and capable of all self care but unable to carry out any work activities] : Status 2- Ambulatory and capable of all self care but unable to carry out any work activities. Up and about more than 50% of waking hours [Obese] : obese [Normal] : affect appropriate [de-identified] : Interactive, well groomed, in NAD. [de-identified] : negative cervical, supra/infraclavicular adenopathy. [de-identified] : S1S2 sl tachy rate. [de-identified] : clear [de-identified] : without spinal or cva tenderness. [de-identified] : negative pedal edema or calve tenderness [de-identified] : BS+, soft, nontender on palpation.

## 2019-10-22 NOTE — HISTORY OF PRESENT ILLNESS
[de-identified] : Ms. SERA JOVEL was diagnosed with endometrial cancer at age 65.\par  She was initially diagnosed with a grade 1 endometrial cancer in 2015 at [facility]. At that time she was seen by Dr. Givens at [place].  She evidently was determined not to be a candidate for an abdominal surgery and a vaginal hysterectomy was recommended. She was taken to the OR for a VH, however, the procedure was aborted due to the "uterus being glued to the colon". She was then treated with whole pelvic RT 5040 cGY completed 10/15/15.  It does not appear that she received any brachytherapy. \par \par In June 2017 she again presented with heavy vaginal bleeding. A mass was noted to be protruding from the vagina and was biopsied showing G1 endometrioid adenocarcinoma. Screening for Encarnacion syndrome with MSI showed loss of expression of MLH1 and PMS2, however MLH promotor methylation was present suggesting a sporadic tumor. She was started on an aromatase inhibitor and referred to Dr. Pascale London for surgical consultation.  She underwent robotic laparoscopic hysterectomy and bilateral salpingo-oophorectomy on 10/12/2017.\par \par Since December 2017, she notes daily vaginal spotting.  She was seen for follow by Dr. London who noted a vaginal apex recurrence per patient.  Biopsy of vaginal apex on 1/2/18 was positive for endometrial adenocarcinoma, c/w a recurrent endometrioid type adenocarcinoma, diffusely ER positive (almost 100%), and AK positive (>90%).\par February 2018 - trial of megace + tamoxifen alternating - developed increased vaginal spotting with Tamoxifen - stopped both meds in March 2018. \par 5/11/18 - 9/7/18  6 cycles of carboplatin + taxol (q 3 week)\par Completed vaginal cuff mass SBRT on 11/14/18. \par 4/10/19 PET with persistent avidity at vaginal cuff and activity suspicious for mets to lymph nodes.   [de-identified] : Here for evaluation of C8 Keytruda. Persistent c/o urinary spasms and now reports more persistent RBCs, which she feels is r/t urinary stream and not sure this is vaginal discharge. Low grade fevers, no SOB, no CP, appetite is good, no constipation or diarrhea, No LE edema. Energy level is fair.. The remainder of ROS is negative.\par Patient presented to [facility] this weekend. She is feeling better since hospital visit. Leaking in pads.

## 2019-11-05 NOTE — RESULTS/DATA
[FreeTextEntry1] : 9/25/19 CT C/A/P\par No evidence of metastatic disease in the thorax. \par \par Since the prior examination dated 7/2/2019, interval development of fluid \par and air within the superior aspect of the vaginal cuff with irregular soft \par tissue margins of the vaginal cuff in this region as well as interval \par development of increased bladder wall thickening and air in the bladder. 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 The possibility of \par tumor in the region of the vaginal cuff should be considered. \par \par Increase in size of the right-sided pelvic mass. \par \par Mild to moderate left hydroureteronephrosis is again noted. \par \par \par \par \par

## 2019-11-05 NOTE — PHYSICAL EXAM
[Ambulatory and capable of all self care but unable to carry out any work activities] : Status 2- Ambulatory and capable of all self care but unable to carry out any work activities. Up and about more than 50% of waking hours [Obese] : obese [Normal] : affect appropriate [Ulcers] : ulcers [de-identified] : Appears fatigued, well groomed, in NAD. [de-identified] : Left buccal membrane with 3 mm shallow center ulceration, w/ surrounding erythema. No white pathches. [de-identified] : negative cervical, supra/infraclavicular adenopathy. [de-identified] : clear [de-identified] : S1S2 sl tachy rate. [de-identified] : negative pedal edema or calve tenderness [de-identified] : BS+, nondistended, soft, nontender on palpation. [de-identified] : without spinal or cva tenderness. [de-identified] : thinning scalp hair.

## 2019-11-05 NOTE — HISTORY OF PRESENT ILLNESS
[de-identified] : Ms. SERA JOVEL was diagnosed with endometrial cancer at age 65.\par  She was initially diagnosed with a grade 1 endometrial cancer in 2015 at [facility]. At that time she was seen by Dr. Givens at [place].  She evidently was determined not to be a candidate for an abdominal surgery and a vaginal hysterectomy was recommended. She was taken to the OR for a VH, however, the procedure was aborted due to the "uterus being glued to the colon". She was then treated with whole pelvic RT 5040 cGY completed 10/15/15.  It does not appear that she received any brachytherapy. \par \par In June 2017 she again presented with heavy vaginal bleeding. A mass was noted to be protruding from the vagina and was biopsied showing G1 endometrioid adenocarcinoma. Screening for Encarnacion syndrome with MSI showed loss of expression of MLH1 and PMS2, however MLH promotor methylation was present suggesting a sporadic tumor. She was started on an aromatase inhibitor and referred to Dr. Pascale London for surgical consultation.  She underwent robotic laparoscopic hysterectomy and bilateral salpingo-oophorectomy on 10/12/2017.\par \par Since December 2017, she notes daily vaginal spotting.  She was seen for follow by Dr. London who noted a vaginal apex recurrence per patient.  Biopsy of vaginal apex on 1/2/18 was positive for endometrial adenocarcinoma, c/w a recurrent endometrioid type adenocarcinoma, diffusely ER positive (almost 100%), and VA positive (>90%).\par February 2018 - trial of megace + tamoxifen alternating - developed increased vaginal spotting with Tamoxifen - stopped both meds in March 2018. \par 5/11/18 - 9/7/18  6 cycles of carboplatin + taxol (q 3 week)\par Completed vaginal cuff mass SBRT on 11/14/18. \par 4/10/19 PET with persistent avidity at vaginal cuff and activity suspicious for mets to lymph nodes.   [de-identified] : Has not seen urologist as yet, and states this was discussed w/ Dr. Dillon, and she stated this can wait as long as she is asymptomatic. She stated Doxil on 10/28/19. Mouth sores started about 4-5 days post chemo. Denies fevers, no SOB, no CP, appetite is is less, ++ constipation 2 days post chemo, but by the 4th day she had severe c/o constipation and needed to take a Dulcolax suppository she had a satisfactory BM. LBM was this AM, she does not feel like she is evacuating bowel completely. No  diarrhea, no pain/burning w/ urination, there is no blood in the urine. No vaginal d/c or bleeding.  No LE edema. Energy level is poor. The remainder of ROS is negative.

## 2019-11-05 NOTE — ASSESSMENT
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  Developed local recurrence at vaginal apex which progressed through endocrine therapy. \par MRI pelvis 4/16/18 with increase in size of mass 6.2 cm, locally advanced.  Received 6 cycles of Carboplatin + Taxol completed on 9/7/18 with great response however MRI pelvis on 11/16/18 showed enlarging mass to which she received SBRT completed on 12/14/18.  4/10/19 PET with persistent avidity at vaginal cuff and activity suspicious for mets to lymph nodes. She began Keytruda on 4/26/19. CT on 7/02/19 shows no evidence of metastatic disease in the thorax. Bulky appearing vaginal cuff is unchanged in appearance. Patient has known \par FDG avid disease on PET/CT scan dated 4/10/2019 in this region but the tumor cannot be discretely measured on the CT images. Interval development of mild left hydroureteronephrosis likely from compression from the vaginal cuff mass. Increase in size of the right sided pelvic mass. now. CT scans 9/25/19 shows progression\par \par Seen by Dr. Dillon and recommendation for Docetaxel, C1 given on 10/28/19. Appears fatigued today. + Shallow L buccal mouth ulceration, and c/o constipation. No recurrent bleeding. WBC today 7.0, hgb 9.8 g/dL, and is stable. PLT 230K. \par \par Plan: \par - Mucositis: advised rinsing w/ salt (1/4 tsp) in a Glynn cup of warm water.\par - BLM rx'd\par - Constipation: Lactulose Rx'd.\par - Seen by Dr. Roberts, urology on 10/17. \par - F/u w/ Dr. Thompson\par - Seen by Pascale London\par - Has f/u w/ Dr. Stanton on 11/12.

## 2019-11-06 PROBLEM — R31.0 GROSS HEMATURIA: Status: ACTIVE | Noted: 2019-01-01

## 2019-11-06 PROBLEM — N95.0 POSTMENOPAUSAL VAGINAL BLEEDING: Status: ACTIVE | Noted: 2018-11-09

## 2019-11-06 PROBLEM — E66.9 OBESITY: Status: ACTIVE | Noted: 2017-09-05

## 2019-11-06 NOTE — HISTORY OF PRESENT ILLNESS
[FreeTextEntry1] : 68 you initially diagnosed with G1 EMCA in 2015. \par Reports a failed vaginal hysterectomy (reportedly due to adhesions to rectum).\par Treated with EBRT.\par In 8/2017 developed PMB and an EMBx demonstrated G1 EMCA again. \par \par Saw me for a second opinion on 9/5/17.\par At that time MRI of pelvis shows a normal appearing uterus without evidence of extrauterine metastasis. \par \par Elected to proceed with surgery and underwent a RTLH and BSO on 10/12/17. \par Omental J-flap placed to pelvis as pelvic tissues with significant RT effects. \par \par Diagnosed with a vaginal apex recurrence at 1/2/18 ER/NC positive.\par Initial therapy with alternating Megace and Tamoxifen. \par Clinically without response with increase bleeding and difficulty emptying bladder. \par \par MRI 4/16/18 - significant interval progression with vaginal apex mass now measuring 6 cm. \par \par Now returns for first visit in GYN ONC since 4/2018. \par Progressive disease despite 6 cycles of CB/T and additional cuff RT. \par Most recent therapy has been pembrolizimab. \par \par She was seen at [facility] in mid October. She has been experiencing more vaginal bleeding and is now leaking urine via the vagina. She was having hematuria and was seen by a urologist who performed a cystoscopy and had plans to reportedly perform a bladder fulgeration (?)\par \par She presents for evaluation. \par \par

## 2019-11-06 NOTE — PHYSICAL EXAM
[Absent] : Adnexa(ae): Absent [Normal] : Anus and perineum: Normal sphincter tone, no masses, no prolapse. [Abnormal] : Recto-Vaginal Exam: Abnormal [de-identified] : well healed LSC scars [de-identified] : Upper vagina obliterated by tumor, tumor extend to involve full length of vagina. unable visualize distinct fistula tract but erosion of anterior vaginal wall by tumor noted [Ambulatory and capable of all self care but unable to carry out any work activities] : Status 2- Ambulatory and capable of all self care but unable to carry out any work activities. Up and about more than 50% of waking hours

## 2019-11-13 NOTE — PHYSICAL EXAM
[Ambulatory and capable of all self care but unable to carry out any work activities] : Status 2- Ambulatory and capable of all self care but unable to carry out any work activities. Up and about more than 50% of waking hours [Ulcers] : ulcers [Obese] : obese [Normal] : grossly intact [de-identified] : Appears fatigued, well groomed, in NAD. [de-identified] : negative cervical, supra/infraclavicular adenopathy. [de-identified] : clear [de-identified] : BS+, nondistended, soft, nontender on palpation. [de-identified] : negative pedal edema or calve tenderness [de-identified] : S1S2 sl tachy rate. [de-identified] : without spinal or cva tenderness.

## 2019-11-13 NOTE — ASSESSMENT
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  Developed local recurrence at vaginal apex which progressed through endocrine therapy. \par MRI pelvis 4/16/18 with increase in size of mass 6.2 cm, locally advanced.  Received 6 cycles of Carboplatin + Taxol completed on 9/7/18 with great response however MRI pelvis on 11/16/18 showed enlarging mass to which she received SBRT completed on 12/14/18.  4/10/19 PET with persistent avidity at vaginal cuff and activity suspicious for mets to lymph nodes. She began Keytruda on 4/2019, and switched to Doxil in 10/2019 for POD. \par \par Doing reasonably well, with C1 had significant constipation. Mouth sores have resolved. \par \par Plan: \par -C2 doxil on 11/26\par -RTO 6 weeks

## 2019-11-13 NOTE — HISTORY OF PRESENT ILLNESS
[de-identified] : Ms. SERA JOVEL was diagnosed with endometrial cancer at age 65.\par  She was initially diagnosed with a grade 1 endometrial cancer in 2015 at [facility]. At that time she was seen by Dr. Givens at [place].  She evidently was determined not to be a candidate for an abdominal surgery and a vaginal hysterectomy was recommended. She was taken to the OR for a VH, however, the procedure was aborted due to the "uterus being glued to the colon". She was then treated with whole pelvic RT 5040 cGY completed 10/15/15.  It does not appear that she received any brachytherapy. \par \par In June 2017 she again presented with heavy vaginal bleeding. A mass was noted to be protruding from the vagina and was biopsied showing G1 endometrioid adenocarcinoma. Screening for Encarnacion syndrome with MSI showed loss of expression of MLH1 and PMS2, however MLH promotor methylation was present suggesting a sporadic tumor. She was started on an aromatase inhibitor and referred to Dr. Pascale London for surgical consultation.  She underwent robotic laparoscopic hysterectomy and bilateral salpingo-oophorectomy on 10/12/2017.\par \par Vaginal apex recurrence s/p biopsy on 1/2/18 was positive for endometrial adenocarcinoma, c/w a recurrent endometrioid type adenocarcinoma, diffusely ER positive (almost 100%), and AR positive (>90%).\par \par Treatment summary:\par February 2018 - trial of megace + tamoxifen alternating - developed increased vaginal spotting with Tamoxifen - stopped both meds in March 2018. \par 5/11/18 - 9/7/18  6 cycles of carboplatin + taxol (q 3 week)\par Completed vaginal cuff mass SBRT on 11/14/18. \par 4/10/19 PET with persistent avidity at vaginal cuff and activity suspicious for mets to lymph nodes.   [de-identified] : REturns for follow up.\par S/p C1 of doxil on 10/28/19.  Reports severe constipation for which she tried "everything", finally dulcolax suppository helped.\par Low energy levels.  No bladder spasms with oxybutynin.  No fever. No vaginal discharge. \par \par

## 2019-11-13 NOTE — ADDENDUM
[FreeTextEntry1] : I, Jaquelin Adames, acted solely as a scribe for Dr. Emperatriz Stanton on 11/12/19.

## 2019-12-24 NOTE — HISTORY OF PRESENT ILLNESS
[de-identified] : Ms. SERA JOVEL was diagnosed with endometrial cancer at age 65.\par  She was initially diagnosed with a grade 1 endometrial cancer in 2015 at [facility]. At that time she was seen by Dr. Givens at [facility].  She evidently was determined not to be a candidate for an abdominal surgery and a vaginal hysterectomy was recommended. She was taken to the OR for a VH, however, the procedure was aborted due to the "uterus being glued to the colon". She was then treated with whole pelvic RT 5040 cGY completed 10/15/15.  It does not appear that she received any brachytherapy. \par \par In June 2017 she again presented with heavy vaginal bleeding. A mass was noted to be protruding from the vagina and was biopsied showing G1 endometrioid adenocarcinoma. Screening for Encarnacion syndrome with MSI showed loss of expression of MLH1 and PMS2, however MLH promotor methylation was present suggesting a sporadic tumor. She was started on an aromatase inhibitor and referred to Dr. Pascale London for surgical consultation.  She underwent robotic laparoscopic hysterectomy and bilateral salpingo-oophorectomy on 10/12/2017.\par \par Vaginal apex recurrence s/p biopsy on 1/2/18 was positive for endometrial adenocarcinoma, c/w a recurrent endometrioid type adenocarcinoma, diffusely ER positive (almost 100%), and PA positive (>90%).\par \par Treatment summary:\par February 2018 - trial of megace + tamoxifen alternating - developed increased vaginal spotting with Tamoxifen - stopped both meds in March 2018. \par 5/11/18 - 9/7/18  6 cycles of carboplatin + taxol (q 3 week)\par Completed vaginal cuff mass SBRT on 11/14/18. \par 4/10/19 PET with persistent avidity at vaginal cuff and activity suspicious for mets to lymph nodes.   [de-identified] : Patient presents for follow-up.\par She is tolerating doxil and is due for C3 treatment today. \par Today with complaint of urinary incontinence and has to wear a pad during the day and night as urine is leaking. She is following with urologist, Dr. Roberts.\par Also with lower pelvic pain and chemotherapy-induced constipation that lasts a week after treatment requiring mag citrate which is the only laxative that works for her post chemo. Last BM was today, and has 3 BMs per day.\par No fever. No LE edema.\par She is able to do all of her ADLs.\par No other complaints today.\par

## 2019-12-24 NOTE — ADDENDUM
[FreeTextEntry1] : I, Janice Archer, solely acted as a scribe for Dr. Emperatriz Stanton on 12/24/19.

## 2019-12-24 NOTE — PHYSICAL EXAM
[Ambulatory and capable of all self care but unable to carry out any work activities] : Status 2- Ambulatory and capable of all self care but unable to carry out any work activities. Up and about more than 50% of waking hours [Obese] : obese [Normal] : affect appropriate [de-identified] : Appears fatigued, well groomed, in NAD. [de-identified] : negative cervical, supra/infraclavicular adenopathy. [de-identified] : clear [de-identified] : S1S2  [de-identified] : no edema [de-identified] :  nondistended, soft, nontender on palpation. [de-identified] : without spinal or cva tenderness.

## 2019-12-24 NOTE — ASSESSMENT
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  Developed local recurrence at vaginal apex which progressed through endocrine therapy. \par MRI pelvis 4/16/18 with increase in size of mass 6.2 cm, locally advanced.  Received 6 cycles of Carboplatin + Taxol completed on 9/7/18 with great response however MRI pelvis on 11/16/18 showed enlarging mass to which she received SBRT completed on 12/14/18.  4/10/19 PET with persistent avidity at vaginal cuff and activity suspicious for mets to lymph nodes. She began Keytruda on 4/2019, and switched to Doxil in 10/2019 for POD. \par \par Tolerating Doxil well except constipation post chemotherapy. Counts stable. \par \par Plan: \par -C3 doxil today\par -plan for restaging CT scans in ~2-3 weeks\par -RTO 4 weeks

## 2020-01-01 ENCOUNTER — LABORATORY RESULT (OUTPATIENT)
Age: 70
End: 2020-01-01

## 2020-01-01 ENCOUNTER — APPOINTMENT (OUTPATIENT)
Age: 70
End: 2020-01-01

## 2020-01-01 ENCOUNTER — RESULT REVIEW (OUTPATIENT)
Age: 70
End: 2020-01-01

## 2020-01-01 ENCOUNTER — APPOINTMENT (OUTPATIENT)
Dept: HEMATOLOGY ONCOLOGY | Facility: CLINIC | Age: 70
End: 2020-01-01

## 2020-01-01 ENCOUNTER — APPOINTMENT (OUTPATIENT)
Dept: INTERVENTIONAL RADIOLOGY/VASCULAR | Facility: CLINIC | Age: 70
End: 2020-01-01
Payer: MEDICARE

## 2020-01-01 ENCOUNTER — APPOINTMENT (OUTPATIENT)
Dept: HEMATOLOGY ONCOLOGY | Facility: CLINIC | Age: 70
End: 2020-01-01
Payer: MEDICARE

## 2020-01-01 ENCOUNTER — FORM ENCOUNTER (OUTPATIENT)
Age: 70
End: 2020-01-01

## 2020-01-01 ENCOUNTER — OUTPATIENT (OUTPATIENT)
Dept: OUTPATIENT SERVICES | Facility: HOSPITAL | Age: 70
LOS: 1 days | Discharge: HOME | End: 2020-01-01

## 2020-01-01 ENCOUNTER — OUTPATIENT (OUTPATIENT)
Dept: OUTPATIENT SERVICES | Facility: HOSPITAL | Age: 70
LOS: 1 days | End: 2020-01-01
Payer: MEDICARE

## 2020-01-01 ENCOUNTER — APPOINTMENT (OUTPATIENT)
Dept: CT IMAGING | Facility: CLINIC | Age: 70
End: 2020-01-01
Payer: MEDICARE

## 2020-01-01 ENCOUNTER — APPOINTMENT (OUTPATIENT)
Dept: UROLOGY | Facility: CLINIC | Age: 70
End: 2020-01-01
Payer: MEDICARE

## 2020-01-01 ENCOUNTER — TRANSCRIPTION ENCOUNTER (OUTPATIENT)
Age: 70
End: 2020-01-01

## 2020-01-01 ENCOUNTER — OUTPATIENT (OUTPATIENT)
Dept: OUTPATIENT SERVICES | Facility: HOSPITAL | Age: 70
LOS: 1 days | End: 2020-01-01

## 2020-01-01 ENCOUNTER — INPATIENT (INPATIENT)
Facility: HOSPITAL | Age: 70
LOS: 3 days | Discharge: HOME | DRG: 755 | End: 2020-08-02
Attending: OBSTETRICS & GYNECOLOGY | Admitting: OBSTETRICS & GYNECOLOGY
Payer: MEDICARE

## 2020-01-01 ENCOUNTER — APPOINTMENT (OUTPATIENT)
Dept: MRI IMAGING | Facility: CLINIC | Age: 70
End: 2020-01-01
Payer: MEDICARE

## 2020-01-01 VITALS
OXYGEN SATURATION: 99 % | RESPIRATION RATE: 18 BRPM | HEART RATE: 135 BPM | TEMPERATURE: 98 F | DIASTOLIC BLOOD PRESSURE: 84 MMHG | SYSTOLIC BLOOD PRESSURE: 119 MMHG

## 2020-01-01 VITALS
WEIGHT: 222.01 LBS | TEMPERATURE: 100 F | HEIGHT: 62 IN | SYSTOLIC BLOOD PRESSURE: 142 MMHG | RESPIRATION RATE: 16 BRPM | HEART RATE: 103 BPM | OXYGEN SATURATION: 98 % | DIASTOLIC BLOOD PRESSURE: 82 MMHG

## 2020-01-01 VITALS
BODY MASS INDEX: 40.13 KG/M2 | HEART RATE: 109 BPM | SYSTOLIC BLOOD PRESSURE: 156 MMHG | OXYGEN SATURATION: 97 % | WEIGHT: 218.06 LBS | DIASTOLIC BLOOD PRESSURE: 83 MMHG | HEIGHT: 62 IN

## 2020-01-01 VITALS
SYSTOLIC BLOOD PRESSURE: 141 MMHG | HEART RATE: 115 BPM | OXYGEN SATURATION: 97 % | HEIGHT: 62 IN | WEIGHT: 212.03 LBS | DIASTOLIC BLOOD PRESSURE: 88 MMHG | TEMPERATURE: 98.1 F | BODY MASS INDEX: 39.02 KG/M2

## 2020-01-01 VITALS
DIASTOLIC BLOOD PRESSURE: 82 MMHG | TEMPERATURE: 98.3 F | HEIGHT: 62 IN | HEART RATE: 118 BPM | OXYGEN SATURATION: 95 % | SYSTOLIC BLOOD PRESSURE: 150 MMHG

## 2020-01-01 VITALS — TEMPERATURE: 98.6 F | HEART RATE: 121 BPM | DIASTOLIC BLOOD PRESSURE: 76 MMHG | SYSTOLIC BLOOD PRESSURE: 141 MMHG

## 2020-01-01 VITALS — RESPIRATION RATE: 16 BRPM | HEART RATE: 121 BPM | SYSTOLIC BLOOD PRESSURE: 123 MMHG | DIASTOLIC BLOOD PRESSURE: 74 MMHG

## 2020-01-01 VITALS
SYSTOLIC BLOOD PRESSURE: 127 MMHG | HEIGHT: 62 IN | HEART RATE: 125 BPM | DIASTOLIC BLOOD PRESSURE: 73 MMHG | BODY MASS INDEX: 38.09 KG/M2 | OXYGEN SATURATION: 97 % | WEIGHT: 207 LBS | TEMPERATURE: 98.7 F

## 2020-01-01 VITALS
OXYGEN SATURATION: 100 % | SYSTOLIC BLOOD PRESSURE: 134 MMHG | DIASTOLIC BLOOD PRESSURE: 64 MMHG | RESPIRATION RATE: 15 BRPM | HEART RATE: 96 BPM

## 2020-01-01 VITALS — HEART RATE: 94 BPM

## 2020-01-01 DIAGNOSIS — Z00.00 ENCOUNTER FOR GENERAL ADULT MEDICAL EXAMINATION W/OUT ABNORMAL FINDINGS: ICD-10-CM

## 2020-01-01 DIAGNOSIS — D64.9 ANEMIA, UNSPECIFIED: ICD-10-CM

## 2020-01-01 DIAGNOSIS — K62.89 OTHER SPECIFIED DISEASES OF ANUS AND RECTUM: ICD-10-CM

## 2020-01-01 DIAGNOSIS — Z98.42 CATARACT EXTRACTION STATUS, LEFT EYE: Chronic | ICD-10-CM

## 2020-01-01 DIAGNOSIS — Z51.11 ENCOUNTER FOR ANTINEOPLASTIC CHEMOTHERAPY: ICD-10-CM

## 2020-01-01 DIAGNOSIS — Z96.653 PRESENCE OF ARTIFICIAL KNEE JOINT, BILATERAL: Chronic | ICD-10-CM

## 2020-01-01 DIAGNOSIS — Z93.6 OTHER ARTIFICIAL OPENINGS OF URINARY TRACT STATUS: Chronic | ICD-10-CM

## 2020-01-01 DIAGNOSIS — D50.0 IRON DEFICIENCY ANEMIA SECONDARY TO BLOOD LOSS (CHRONIC): ICD-10-CM

## 2020-01-01 DIAGNOSIS — R09.89 OTHER SPECIFIED SYMPTOMS AND SIGNS INVOLVING THE CIRCULATORY AND RESPIRATORY SYSTEMS: ICD-10-CM

## 2020-01-01 DIAGNOSIS — Z93.6 OTHER ARTIFICIAL OPENINGS OF URINARY TRACT STATUS: ICD-10-CM

## 2020-01-01 DIAGNOSIS — Z96.649 PRESENCE OF UNSPECIFIED ARTIFICIAL HIP JOINT: Chronic | ICD-10-CM

## 2020-01-01 DIAGNOSIS — D50.9 IRON DEFICIENCY ANEMIA, UNSPECIFIED: ICD-10-CM

## 2020-01-01 DIAGNOSIS — Z51.89 ENCOUNTER FOR OTHER SPECIFIED AFTERCARE: ICD-10-CM

## 2020-01-01 DIAGNOSIS — R11.2 NAUSEA WITH VOMITING, UNSPECIFIED: ICD-10-CM

## 2020-01-01 DIAGNOSIS — C54.1 MALIGNANT NEOPLASM OF ENDOMETRIUM: ICD-10-CM

## 2020-01-01 DIAGNOSIS — G62.0 DRUG-INDUCED POLYNEUROPATHY: ICD-10-CM

## 2020-01-01 DIAGNOSIS — F32.9 MAJOR DEPRESSIVE DISORDER, SINGLE EPISODE, UNSPECIFIED: ICD-10-CM

## 2020-01-01 DIAGNOSIS — D64.81 ANEMIA DUE TO ANTINEOPLASTIC CHEMOTHERAPY: ICD-10-CM

## 2020-01-01 DIAGNOSIS — E86.0 DEHYDRATION: ICD-10-CM

## 2020-01-01 DIAGNOSIS — K62.5 HEMORRHAGE OF ANUS AND RECTUM: ICD-10-CM

## 2020-01-01 DIAGNOSIS — T45.1X5A ADVERSE EFFECT OF ANTINEOPLASTIC AND IMMUNOSUPPRESSIVE DRUGS, INITIAL ENCOUNTER: ICD-10-CM

## 2020-01-01 DIAGNOSIS — T45.1X5A DRUG-INDUCED POLYNEUROPATHY: ICD-10-CM

## 2020-01-01 DIAGNOSIS — N39.0 URINARY TRACT INFECTION, SITE NOT SPECIFIED: ICD-10-CM

## 2020-01-01 DIAGNOSIS — R82.90 UNSPECIFIED ABNORMAL FINDINGS IN URINE: ICD-10-CM

## 2020-01-01 DIAGNOSIS — K59.00 CONSTIPATION, UNSPECIFIED: ICD-10-CM

## 2020-01-01 DIAGNOSIS — N13.9 OBSTRUCTIVE AND REFLUX UROPATHY, UNSPECIFIED: ICD-10-CM

## 2020-01-01 DIAGNOSIS — T45.1X5A ANEMIA DUE TO ANTINEOPLASTIC CHEMOTHERAPY: ICD-10-CM

## 2020-01-01 DIAGNOSIS — R93.5 ABNORMAL FINDINGS ON DIAGNOSTIC IMAGING OF OTHER ABDOMINAL REGIONS, INCLUDING RETROPERITONEUM: ICD-10-CM

## 2020-01-01 DIAGNOSIS — E53.8 DEFICIENCY OF OTHER SPECIFIED B GROUP VITAMINS: ICD-10-CM

## 2020-01-01 DIAGNOSIS — R53.83 OTHER FATIGUE: ICD-10-CM

## 2020-01-01 DIAGNOSIS — Z87.440 PERSONAL HISTORY OF URINARY (TRACT) INFECTIONS: ICD-10-CM

## 2020-01-01 DIAGNOSIS — R63.0 ANOREXIA: ICD-10-CM

## 2020-01-01 DIAGNOSIS — D50.1 SIDEROPENIC DYSPHAGIA: ICD-10-CM

## 2020-01-01 DIAGNOSIS — N82.0 VESICOVAGINAL FISTULA: ICD-10-CM

## 2020-01-01 DIAGNOSIS — Z00.00 ENCOUNTER FOR GENERAL ADULT MEDICAL EXAMINATION WITHOUT ABNORMAL FINDINGS: ICD-10-CM

## 2020-01-01 DIAGNOSIS — G89.3 NEOPLASM RELATED PAIN (ACUTE) (CHRONIC): ICD-10-CM

## 2020-01-01 DIAGNOSIS — E83.42 HYPOMAGNESEMIA: ICD-10-CM

## 2020-01-01 LAB
-  AMIKACIN: SIGNIFICANT CHANGE UP
-  AMOXICILLIN/CLAVULANIC ACID: SIGNIFICANT CHANGE UP
-  AMPICILLIN/SULBACTAM: SIGNIFICANT CHANGE UP
-  AMPICILLIN: SIGNIFICANT CHANGE UP
-  AZTREONAM: SIGNIFICANT CHANGE UP
-  CEFAZOLIN: SIGNIFICANT CHANGE UP
-  CEFEPIME: SIGNIFICANT CHANGE UP
-  CEFOXITIN: SIGNIFICANT CHANGE UP
-  CEFTRIAXONE: SIGNIFICANT CHANGE UP
-  CIPROFLOXACIN: SIGNIFICANT CHANGE UP
-  ERTAPENEM: SIGNIFICANT CHANGE UP
-  GENTAMICIN: SIGNIFICANT CHANGE UP
-  IMIPENEM: SIGNIFICANT CHANGE UP
-  LEVOFLOXACIN: SIGNIFICANT CHANGE UP
-  MEROPENEM: SIGNIFICANT CHANGE UP
-  NITROFURANTOIN: SIGNIFICANT CHANGE UP
-  PIPERACILLIN/TAZOBACTAM: SIGNIFICANT CHANGE UP
-  TIGECYCLINE: SIGNIFICANT CHANGE UP
-  TOBRAMYCIN: SIGNIFICANT CHANGE UP
-  TRIMETHOPRIM/SULFAMETHOXAZOLE: SIGNIFICANT CHANGE UP
ALBUMIN SERPL ELPH-MCNC: 2.5 G/DL — LOW (ref 3.3–5.2)
ALBUMIN SERPL ELPH-MCNC: 2.8 G/DL — LOW (ref 3.3–5.2)
ALP SERPL-CCNC: 84 U/L — SIGNIFICANT CHANGE UP (ref 40–120)
ALT FLD-CCNC: 10 U/L — SIGNIFICANT CHANGE UP
ANION GAP SERPL CALC-SCNC: 12 MMOL/L — SIGNIFICANT CHANGE UP (ref 5–17)
ANION GAP SERPL CALC-SCNC: 13 MMOL/L — SIGNIFICANT CHANGE UP (ref 5–17)
ANION GAP SERPL CALC-SCNC: 13 MMOL/L — SIGNIFICANT CHANGE UP (ref 5–17)
ANION GAP SERPL CALC-SCNC: 15 MMOL/L — SIGNIFICANT CHANGE UP (ref 5–17)
ANION GAP SERPL CALC-SCNC: 16 MMOL/L — SIGNIFICANT CHANGE UP (ref 5–17)
ANISOCYTOSIS BLD QL: SIGNIFICANT CHANGE UP
ANISOCYTOSIS BLD QL: SLIGHT — SIGNIFICANT CHANGE UP
APPEARANCE UR: ABNORMAL
APPEARANCE: ABNORMAL
APTT BLD: 31 SEC — SIGNIFICANT CHANGE UP (ref 27.5–35.5)
AST SERPL-CCNC: 17 U/L — SIGNIFICANT CHANGE UP
BACTERIA # UR AUTO: ABNORMAL
BACTERIA UR CULT: ABNORMAL
BACTERIA: ABNORMAL
BASO STIPL BLD QL SMEAR: PRESENT — SIGNIFICANT CHANGE UP
BASO STIPL BLD QL SMEAR: PRESENT — SIGNIFICANT CHANGE UP
BASOPHILS # BLD AUTO: 0 K/UL — SIGNIFICANT CHANGE UP (ref 0–0.2)
BASOPHILS # BLD AUTO: 0.03 K/UL — SIGNIFICANT CHANGE UP (ref 0–0.2)
BASOPHILS # BLD AUTO: 0.04 K/UL — SIGNIFICANT CHANGE UP (ref 0–0.2)
BASOPHILS # BLD AUTO: 0.05 K/UL — SIGNIFICANT CHANGE UP (ref 0–0.2)
BASOPHILS # BLD AUTO: 0.06 K/UL — SIGNIFICANT CHANGE UP (ref 0–0.2)
BASOPHILS # BLD AUTO: 0.1 K/UL — SIGNIFICANT CHANGE UP (ref 0–0.2)
BASOPHILS NFR BLD AUTO: 0.3 % — SIGNIFICANT CHANGE UP (ref 0–2)
BASOPHILS NFR BLD AUTO: 0.3 % — SIGNIFICANT CHANGE UP (ref 0–2)
BASOPHILS NFR BLD AUTO: 0.4 % — SIGNIFICANT CHANGE UP (ref 0–2)
BASOPHILS NFR BLD AUTO: 0.5 % — SIGNIFICANT CHANGE UP (ref 0–2)
BASOPHILS NFR BLD AUTO: 0.5 % — SIGNIFICANT CHANGE UP (ref 0–2)
BASOPHILS NFR BLD AUTO: 0.6 % — SIGNIFICANT CHANGE UP (ref 0–2)
BASOPHILS NFR BLD AUTO: 0.7 % — SIGNIFICANT CHANGE UP (ref 0–2)
BASOPHILS NFR BLD AUTO: 0.8 % — SIGNIFICANT CHANGE UP (ref 0–2)
BASOPHILS NFR BLD AUTO: 0.9 % — SIGNIFICANT CHANGE UP (ref 0–2)
BASOPHILS NFR BLD AUTO: 1 % — SIGNIFICANT CHANGE UP (ref 0–2)
BASOPHILS NFR BLD AUTO: 1 % — SIGNIFICANT CHANGE UP (ref 0–2)
BILIRUB SERPL-MCNC: 0.3 MG/DL — LOW (ref 0.4–2)
BILIRUB UR-MCNC: NEGATIVE — SIGNIFICANT CHANGE UP
BILIRUBIN URINE: NEGATIVE
BLD GP AB SCN SERPL QL: SIGNIFICANT CHANGE UP
BLOOD URINE: NEGATIVE
BUN SERPL-MCNC: 10 MG/DL — SIGNIFICANT CHANGE UP (ref 8–20)
BUN SERPL-MCNC: 12 MG/DL — SIGNIFICANT CHANGE UP (ref 8–20)
BUN SERPL-MCNC: 12 MG/DL — SIGNIFICANT CHANGE UP (ref 8–20)
BUN SERPL-MCNC: 14 MG/DL — SIGNIFICANT CHANGE UP (ref 8–20)
BUN SERPL-MCNC: 9 MG/DL — SIGNIFICANT CHANGE UP (ref 8–20)
CALCIUM SERPL-MCNC: 8.3 MG/DL — LOW (ref 8.6–10.2)
CALCIUM SERPL-MCNC: 8.3 MG/DL — LOW (ref 8.6–10.2)
CALCIUM SERPL-MCNC: 8.5 MG/DL — LOW (ref 8.6–10.2)
CALCIUM SERPL-MCNC: 8.6 MG/DL — SIGNIFICANT CHANGE UP (ref 8.6–10.2)
CALCIUM SERPL-MCNC: 8.7 MG/DL — SIGNIFICANT CHANGE UP (ref 8.6–10.2)
CHLORIDE SERPL-SCNC: 93 MMOL/L — LOW (ref 98–107)
CHLORIDE SERPL-SCNC: 95 MMOL/L — LOW (ref 98–107)
CHLORIDE SERPL-SCNC: 96 MMOL/L — LOW (ref 98–107)
CHLORIDE SERPL-SCNC: 96 MMOL/L — LOW (ref 98–107)
CHLORIDE SERPL-SCNC: 97 MMOL/L — LOW (ref 98–107)
CO2 SERPL-SCNC: 22 MMOL/L — SIGNIFICANT CHANGE UP (ref 22–29)
CO2 SERPL-SCNC: 23 MMOL/L — SIGNIFICANT CHANGE UP (ref 22–29)
CO2 SERPL-SCNC: 23 MMOL/L — SIGNIFICANT CHANGE UP (ref 22–29)
CO2 SERPL-SCNC: 24 MMOL/L — SIGNIFICANT CHANGE UP (ref 22–29)
CO2 SERPL-SCNC: 26 MMOL/L — SIGNIFICANT CHANGE UP (ref 22–29)
COLOR SPEC: YELLOW — SIGNIFICANT CHANGE UP
COLOR: YELLOW
CREAT SERPL-MCNC: 0.8 MG/DL — SIGNIFICANT CHANGE UP (ref 0.5–1.3)
CREAT SERPL-MCNC: 0.81 MG/DL — SIGNIFICANT CHANGE UP (ref 0.5–1.3)
CREAT SERPL-MCNC: 0.94 MG/DL — SIGNIFICANT CHANGE UP (ref 0.5–1.3)
CREAT SERPL-MCNC: 0.97 MG/DL — SIGNIFICANT CHANGE UP (ref 0.5–1.3)
CREAT SERPL-MCNC: 1.01 MG/DL — SIGNIFICANT CHANGE UP (ref 0.5–1.3)
CULTURE RESULTS: SIGNIFICANT CHANGE UP
DACRYOCYTES BLD QL SMEAR: SLIGHT — SIGNIFICANT CHANGE UP
DIFF PNL FLD: ABNORMAL
DOHLE BOD BLD QL SMEAR: PRESENT — SIGNIFICANT CHANGE UP
EOSINOPHIL # BLD AUTO: 0 K/UL — SIGNIFICANT CHANGE UP (ref 0–0.5)
EOSINOPHIL # BLD AUTO: 0 K/UL — SIGNIFICANT CHANGE UP (ref 0–0.5)
EOSINOPHIL # BLD AUTO: 0.1 K/UL — SIGNIFICANT CHANGE UP (ref 0–0.5)
EOSINOPHIL # BLD AUTO: 0.12 K/UL — SIGNIFICANT CHANGE UP (ref 0–0.5)
EOSINOPHIL # BLD AUTO: 0.12 K/UL — SIGNIFICANT CHANGE UP (ref 0–0.5)
EOSINOPHIL # BLD AUTO: 0.13 K/UL — SIGNIFICANT CHANGE UP (ref 0–0.5)
EOSINOPHIL # BLD AUTO: 0.17 K/UL — SIGNIFICANT CHANGE UP (ref 0–0.5)
EOSINOPHIL # BLD AUTO: 0.2 K/UL — SIGNIFICANT CHANGE UP (ref 0–0.5)
EOSINOPHIL # BLD AUTO: 0.3 K/UL — SIGNIFICANT CHANGE UP (ref 0–0.5)
EOSINOPHIL NFR BLD AUTO: 0.6 % — SIGNIFICANT CHANGE UP (ref 0–6)
EOSINOPHIL NFR BLD AUTO: 0.8 % — SIGNIFICANT CHANGE UP (ref 0–6)
EOSINOPHIL NFR BLD AUTO: 0.9 % — SIGNIFICANT CHANGE UP (ref 0–6)
EOSINOPHIL NFR BLD AUTO: 1 % — SIGNIFICANT CHANGE UP (ref 0–6)
EOSINOPHIL NFR BLD AUTO: 1.2 % — SIGNIFICANT CHANGE UP (ref 0–6)
EOSINOPHIL NFR BLD AUTO: 1.3 % — SIGNIFICANT CHANGE UP (ref 0–6)
EOSINOPHIL NFR BLD AUTO: 1.3 % — SIGNIFICANT CHANGE UP (ref 0–6)
EOSINOPHIL NFR BLD AUTO: 1.4 % — SIGNIFICANT CHANGE UP (ref 0–6)
EOSINOPHIL NFR BLD AUTO: 1.4 % — SIGNIFICANT CHANGE UP (ref 0–6)
EOSINOPHIL NFR BLD AUTO: 1.5 % — SIGNIFICANT CHANGE UP (ref 0–6)
EOSINOPHIL NFR BLD AUTO: 1.6 % — SIGNIFICANT CHANGE UP (ref 0–6)
EOSINOPHIL NFR BLD AUTO: 1.7 % — SIGNIFICANT CHANGE UP (ref 0–6)
EOSINOPHIL NFR BLD AUTO: 1.8 % — SIGNIFICANT CHANGE UP (ref 0–6)
EOSINOPHIL NFR BLD AUTO: 2 % — SIGNIFICANT CHANGE UP (ref 0–6)
EOSINOPHIL NFR BLD AUTO: 2.4 % — SIGNIFICANT CHANGE UP (ref 0–6)
EOSINOPHIL NFR BLD AUTO: 2.6 % — SIGNIFICANT CHANGE UP (ref 0–6)
EOSINOPHIL NFR BLD AUTO: 2.8 % — SIGNIFICANT CHANGE UP (ref 0–6)
EPI CELLS # UR: SIGNIFICANT CHANGE UP
GIANT PLATELETS BLD QL SMEAR: PRESENT — SIGNIFICANT CHANGE UP
GIANT PLATELETS BLD QL SMEAR: PRESENT — SIGNIFICANT CHANGE UP
GLUCOSE QUALITATIVE U: NEGATIVE
GLUCOSE SERPL-MCNC: 100 MG/DL — HIGH (ref 70–99)
GLUCOSE SERPL-MCNC: 121 MG/DL — HIGH (ref 70–99)
GLUCOSE SERPL-MCNC: 123 MG/DL — HIGH (ref 70–99)
GLUCOSE SERPL-MCNC: 86 MG/DL — SIGNIFICANT CHANGE UP (ref 70–99)
GLUCOSE SERPL-MCNC: 91 MG/DL — SIGNIFICANT CHANGE UP (ref 70–99)
GLUCOSE UR QL: NEGATIVE — SIGNIFICANT CHANGE UP
HCT VFR BLD CALC: 20.5 % — CRITICAL LOW (ref 34.5–45)
HCT VFR BLD CALC: 21.5 % — LOW (ref 34.5–45)
HCT VFR BLD CALC: 24 % — LOW (ref 34.5–45)
HCT VFR BLD CALC: 25.1 % — LOW (ref 34.5–45)
HCT VFR BLD CALC: 26 % — LOW (ref 34.5–45)
HCT VFR BLD CALC: 26.4 % — LOW (ref 34.5–45)
HCT VFR BLD CALC: 26.5 % — LOW (ref 34.5–45)
HCT VFR BLD CALC: 27.7 % — LOW (ref 34.5–45)
HCT VFR BLD CALC: 27.9 % — LOW (ref 34.5–45)
HCT VFR BLD CALC: 28.6 % — LOW (ref 34.5–45)
HCT VFR BLD CALC: 29.5 % — LOW (ref 34.5–45)
HCT VFR BLD CALC: 29.7 % — LOW (ref 34.5–45)
HCT VFR BLD CALC: 29.8 % — LOW (ref 34.5–45)
HCT VFR BLD CALC: 31.1 % — LOW (ref 34.5–45)
HCT VFR BLD CALC: 31.6 % — LOW (ref 34.5–45)
HCT VFR BLD CALC: 31.7 % — LOW (ref 34.5–45)
HCT VFR BLD CALC: 32.5 % — LOW (ref 34.5–45)
HCT VFR BLD CALC: 33 % — LOW (ref 34.5–45)
HCT VFR BLD CALC: 33.1 % — LOW (ref 34.5–45)
HCT VFR BLD CALC: 33.5 % — LOW (ref 34.5–45)
HCT VFR BLD CALC: 36.8 % — SIGNIFICANT CHANGE UP (ref 34.5–45)
HCV AB S/CO SERPL IA: 0.14 S/CO — SIGNIFICANT CHANGE UP (ref 0–0.99)
HCV AB SERPL-IMP: SIGNIFICANT CHANGE UP
HGB BLD-MCNC: 10.1 G/DL — LOW (ref 11.5–15.5)
HGB BLD-MCNC: 10.2 G/DL — LOW (ref 11.5–15.5)
HGB BLD-MCNC: 10.4 G/DL — LOW (ref 11.5–15.5)
HGB BLD-MCNC: 10.6 G/DL — LOW (ref 11.5–15.5)
HGB BLD-MCNC: 10.6 G/DL — LOW (ref 11.5–15.5)
HGB BLD-MCNC: 10.8 G/DL — LOW (ref 11.5–15.5)
HGB BLD-MCNC: 11.6 G/DL — SIGNIFICANT CHANGE UP (ref 11.5–15.5)
HGB BLD-MCNC: 6.4 G/DL — CRITICAL LOW (ref 11.5–15.5)
HGB BLD-MCNC: 6.8 G/DL — CRITICAL LOW (ref 11.5–15.5)
HGB BLD-MCNC: 7.8 G/DL — LOW (ref 11.5–15.5)
HGB BLD-MCNC: 8 G/DL — LOW (ref 11.5–15.5)
HGB BLD-MCNC: 8.2 G/DL — LOW (ref 11.5–15.5)
HGB BLD-MCNC: 8.4 G/DL — LOW (ref 11.5–15.5)
HGB BLD-MCNC: 8.6 G/DL — LOW (ref 11.5–15.5)
HGB BLD-MCNC: 8.7 G/DL — LOW (ref 11.5–15.5)
HGB BLD-MCNC: 9 G/DL — LOW (ref 11.5–15.5)
HGB BLD-MCNC: 9.3 G/DL — LOW (ref 11.5–15.5)
HGB BLD-MCNC: 9.3 G/DL — LOW (ref 11.5–15.5)
HGB BLD-MCNC: 9.5 G/DL — LOW (ref 11.5–15.5)
HGB BLD-MCNC: 9.8 G/DL — LOW (ref 11.5–15.5)
HGB BLD-MCNC: 9.9 G/DL — LOW (ref 11.5–15.5)
HYALINE CASTS: 0 /LPF
IMM GRANULOCYTES NFR BLD AUTO: 0.6 % — SIGNIFICANT CHANGE UP (ref 0–1.5)
IMM GRANULOCYTES NFR BLD AUTO: 0.6 % — SIGNIFICANT CHANGE UP (ref 0–1.5)
IMM GRANULOCYTES NFR BLD AUTO: 1 % — SIGNIFICANT CHANGE UP (ref 0–1.5)
IMM GRANULOCYTES NFR BLD AUTO: 1.1 % — SIGNIFICANT CHANGE UP (ref 0–1.5)
INR BLD: 1.32 RATIO — HIGH (ref 0.88–1.16)
INR BLD: 1.35 RATIO — HIGH (ref 0.88–1.16)
KETONES UR-MCNC: NEGATIVE — SIGNIFICANT CHANGE UP
KETONES URINE: NEGATIVE
LACTATE BLDV-MCNC: 2.2 MMOL/L — HIGH (ref 0.5–2)
LACTATE SERPL-SCNC: 0.8 MMOL/L — SIGNIFICANT CHANGE UP (ref 0.5–2)
LEUKOCYTE ESTERASE UR-ACNC: ABNORMAL
LEUKOCYTE ESTERASE URINE: ABNORMAL
LG PLATELETS BLD QL AUTO: SLIGHT — SIGNIFICANT CHANGE UP
LIDOCAIN IGE QN: 24 U/L — SIGNIFICANT CHANGE UP (ref 22–51)
LYMPHOCYTES # BLD AUTO: 1.1 K/UL — SIGNIFICANT CHANGE UP (ref 1–3.3)
LYMPHOCYTES # BLD AUTO: 1.3 K/UL — SIGNIFICANT CHANGE UP (ref 1–3.3)
LYMPHOCYTES # BLD AUTO: 1.3 K/UL — SIGNIFICANT CHANGE UP (ref 1–3.3)
LYMPHOCYTES # BLD AUTO: 1.34 K/UL — SIGNIFICANT CHANGE UP (ref 1–3.3)
LYMPHOCYTES # BLD AUTO: 1.4 K/UL — SIGNIFICANT CHANGE UP (ref 1–3.3)
LYMPHOCYTES # BLD AUTO: 1.4 K/UL — SIGNIFICANT CHANGE UP (ref 1–3.3)
LYMPHOCYTES # BLD AUTO: 1.5 K/UL — SIGNIFICANT CHANGE UP (ref 1–3.3)
LYMPHOCYTES # BLD AUTO: 1.57 K/UL — SIGNIFICANT CHANGE UP (ref 1–3.3)
LYMPHOCYTES # BLD AUTO: 1.69 K/UL — SIGNIFICANT CHANGE UP (ref 1–3.3)
LYMPHOCYTES # BLD AUTO: 1.7 K/UL — SIGNIFICANT CHANGE UP (ref 1–3.3)
LYMPHOCYTES # BLD AUTO: 1.8 K/UL — SIGNIFICANT CHANGE UP (ref 1–3.3)
LYMPHOCYTES # BLD AUTO: 1.9 K/UL — SIGNIFICANT CHANGE UP (ref 1–3.3)
LYMPHOCYTES # BLD AUTO: 10 % — LOW (ref 13–44)
LYMPHOCYTES # BLD AUTO: 12.9 % — LOW (ref 13–44)
LYMPHOCYTES # BLD AUTO: 14.3 % — SIGNIFICANT CHANGE UP (ref 13–44)
LYMPHOCYTES # BLD AUTO: 14.5 % — SIGNIFICANT CHANGE UP (ref 13–44)
LYMPHOCYTES # BLD AUTO: 14.7 % — SIGNIFICANT CHANGE UP (ref 13–44)
LYMPHOCYTES # BLD AUTO: 15 % — SIGNIFICANT CHANGE UP (ref 13–44)
LYMPHOCYTES # BLD AUTO: 15.4 % — SIGNIFICANT CHANGE UP (ref 13–44)
LYMPHOCYTES # BLD AUTO: 17.4 % — SIGNIFICANT CHANGE UP (ref 13–44)
LYMPHOCYTES # BLD AUTO: 19.1 % — SIGNIFICANT CHANGE UP (ref 13–44)
LYMPHOCYTES # BLD AUTO: 19.3 % — SIGNIFICANT CHANGE UP (ref 13–44)
LYMPHOCYTES # BLD AUTO: 19.6 % — SIGNIFICANT CHANGE UP (ref 13–44)
LYMPHOCYTES # BLD AUTO: 2.3 K/UL — SIGNIFICANT CHANGE UP (ref 1–3.3)
LYMPHOCYTES # BLD AUTO: 2.4 K/UL — SIGNIFICANT CHANGE UP (ref 1–3.3)
LYMPHOCYTES # BLD AUTO: 2.52 K/UL — SIGNIFICANT CHANGE UP (ref 1–3.3)
LYMPHOCYTES # BLD AUTO: 2.65 K/UL — SIGNIFICANT CHANGE UP (ref 1–3.3)
LYMPHOCYTES # BLD AUTO: 20.7 % — SIGNIFICANT CHANGE UP (ref 13–44)
LYMPHOCYTES # BLD AUTO: 21 % — SIGNIFICANT CHANGE UP (ref 13–44)
LYMPHOCYTES # BLD AUTO: 21.9 % — SIGNIFICANT CHANGE UP (ref 13–44)
LYMPHOCYTES # BLD AUTO: 22.9 % — SIGNIFICANT CHANGE UP (ref 13–44)
LYMPHOCYTES # BLD AUTO: 23 % — SIGNIFICANT CHANGE UP (ref 13–44)
LYMPHOCYTES # BLD AUTO: 24.5 % — SIGNIFICANT CHANGE UP (ref 13–44)
LYMPHOCYTES # BLD AUTO: 24.7 % — SIGNIFICANT CHANGE UP (ref 13–44)
LYMPHOCYTES # BLD AUTO: 27.6 % — SIGNIFICANT CHANGE UP (ref 13–44)
LYMPHOCYTES # BLD AUTO: 29.1 % — SIGNIFICANT CHANGE UP (ref 13–44)
LYMPHOCYTES # BLD AUTO: 3.1 K/UL — SIGNIFICANT CHANGE UP (ref 1–3.3)
MACROCYTES BLD QL: SLIGHT — SIGNIFICANT CHANGE UP
MACROCYTES BLD QL: SLIGHT — SIGNIFICANT CHANGE UP
MAGNESIUM SERPL-MCNC: 1.4 MG/DL — LOW (ref 1.6–2.6)
MAGNESIUM SERPL-MCNC: 1.5 MG/DL — LOW (ref 1.8–2.6)
MAGNESIUM SERPL-MCNC: 1.9 MG/DL — SIGNIFICANT CHANGE UP (ref 1.6–2.6)
MANUAL SMEAR VERIFICATION: SIGNIFICANT CHANGE UP
MCHC RBC-ENTMCNC: 26 PG — LOW (ref 27–34)
MCHC RBC-ENTMCNC: 26.8 PG — LOW (ref 27–34)
MCHC RBC-ENTMCNC: 27 PG — SIGNIFICANT CHANGE UP (ref 27–34)
MCHC RBC-ENTMCNC: 27.1 PG — SIGNIFICANT CHANGE UP (ref 27–34)
MCHC RBC-ENTMCNC: 27.6 PG — SIGNIFICANT CHANGE UP (ref 27–34)
MCHC RBC-ENTMCNC: 27.7 PG — SIGNIFICANT CHANGE UP (ref 27–34)
MCHC RBC-ENTMCNC: 27.8 PG — SIGNIFICANT CHANGE UP (ref 27–34)
MCHC RBC-ENTMCNC: 27.9 PG — SIGNIFICANT CHANGE UP (ref 27–34)
MCHC RBC-ENTMCNC: 28 PG — SIGNIFICANT CHANGE UP (ref 27–34)
MCHC RBC-ENTMCNC: 28.1 PG — SIGNIFICANT CHANGE UP (ref 27–34)
MCHC RBC-ENTMCNC: 28.2 PG — SIGNIFICANT CHANGE UP (ref 27–34)
MCHC RBC-ENTMCNC: 28.5 PG — SIGNIFICANT CHANGE UP (ref 27–34)
MCHC RBC-ENTMCNC: 30 PG — SIGNIFICANT CHANGE UP (ref 27–34)
MCHC RBC-ENTMCNC: 30.2 G/DL — LOW (ref 32–36)
MCHC RBC-ENTMCNC: 30.3 G/DL — LOW (ref 32–36)
MCHC RBC-ENTMCNC: 30.4 PG — SIGNIFICANT CHANGE UP (ref 27–34)
MCHC RBC-ENTMCNC: 30.5 G/DL — LOW (ref 32–36)
MCHC RBC-ENTMCNC: 30.9 PG — SIGNIFICANT CHANGE UP (ref 27–34)
MCHC RBC-ENTMCNC: 30.9 PG — SIGNIFICANT CHANGE UP (ref 27–34)
MCHC RBC-ENTMCNC: 31 PG — SIGNIFICANT CHANGE UP (ref 27–34)
MCHC RBC-ENTMCNC: 31.1 G/DL — LOW (ref 32–36)
MCHC RBC-ENTMCNC: 31.2 G/DL — LOW (ref 32–36)
MCHC RBC-ENTMCNC: 31.2 G/DL — LOW (ref 32–36)
MCHC RBC-ENTMCNC: 31.2 PG — SIGNIFICANT CHANGE UP (ref 27–34)
MCHC RBC-ENTMCNC: 31.2 PG — SIGNIFICANT CHANGE UP (ref 27–34)
MCHC RBC-ENTMCNC: 31.4 G/DL — LOW (ref 32–36)
MCHC RBC-ENTMCNC: 31.5 GM/DL — LOW (ref 32–36)
MCHC RBC-ENTMCNC: 31.6 GM/DL — LOW (ref 32–36)
MCHC RBC-ENTMCNC: 31.6 PG — SIGNIFICANT CHANGE UP (ref 27–34)
MCHC RBC-ENTMCNC: 31.6 PG — SIGNIFICANT CHANGE UP (ref 27–34)
MCHC RBC-ENTMCNC: 32 G/DL — SIGNIFICANT CHANGE UP (ref 32–36)
MCHC RBC-ENTMCNC: 32.4 G/DL — SIGNIFICANT CHANGE UP (ref 32–36)
MCHC RBC-ENTMCNC: 32.5 G/DL — SIGNIFICANT CHANGE UP (ref 32–36)
MCHC RBC-ENTMCNC: 32.6 G/DL — SIGNIFICANT CHANGE UP (ref 32–36)
MCHC RBC-ENTMCNC: 32.6 GM/DL — SIGNIFICANT CHANGE UP (ref 32–36)
MCHC RBC-ENTMCNC: 32.6 GM/DL — SIGNIFICANT CHANGE UP (ref 32–36)
MCHC RBC-ENTMCNC: 32.7 G/DL — SIGNIFICANT CHANGE UP (ref 32–36)
MCHC RBC-ENTMCNC: 32.9 G/DL — SIGNIFICANT CHANGE UP (ref 32–36)
MCHC RBC-ENTMCNC: 32.9 GM/DL — SIGNIFICANT CHANGE UP (ref 32–36)
MCHC RBC-ENTMCNC: 33.1 G/DL — SIGNIFICANT CHANGE UP (ref 32–36)
MCHC RBC-ENTMCNC: 33.1 G/DL — SIGNIFICANT CHANGE UP (ref 32–36)
MCHC RBC-ENTMCNC: 33.2 GM/DL — SIGNIFICANT CHANGE UP (ref 32–36)
MCV RBC AUTO: 100 FL — SIGNIFICANT CHANGE UP (ref 80–100)
MCV RBC AUTO: 84.7 FL — SIGNIFICANT CHANGE UP (ref 80–100)
MCV RBC AUTO: 85.1 FL — SIGNIFICANT CHANGE UP (ref 80–100)
MCV RBC AUTO: 85.2 FL — SIGNIFICANT CHANGE UP (ref 80–100)
MCV RBC AUTO: 85.5 FL — SIGNIFICANT CHANGE UP (ref 80–100)
MCV RBC AUTO: 86 FL — SIGNIFICANT CHANGE UP (ref 80–100)
MCV RBC AUTO: 86 FL — SIGNIFICANT CHANGE UP (ref 80–100)
MCV RBC AUTO: 88 FL — SIGNIFICANT CHANGE UP (ref 80–100)
MCV RBC AUTO: 88.4 FL — SIGNIFICANT CHANGE UP (ref 80–100)
MCV RBC AUTO: 88.5 FL — SIGNIFICANT CHANGE UP (ref 80–100)
MCV RBC AUTO: 88.5 FL — SIGNIFICANT CHANGE UP (ref 80–100)
MCV RBC AUTO: 89.4 FL — SIGNIFICANT CHANGE UP (ref 80–100)
MCV RBC AUTO: 91.5 FL — SIGNIFICANT CHANGE UP (ref 80–100)
MCV RBC AUTO: 92.4 FL — SIGNIFICANT CHANGE UP (ref 80–100)
MCV RBC AUTO: 93.4 FL — SIGNIFICANT CHANGE UP (ref 80–100)
MCV RBC AUTO: 93.8 FL — SIGNIFICANT CHANGE UP (ref 80–100)
MCV RBC AUTO: 94 FL — SIGNIFICANT CHANGE UP (ref 80–100)
MCV RBC AUTO: 95 FL — SIGNIFICANT CHANGE UP (ref 80–100)
MCV RBC AUTO: 95.6 FL — SIGNIFICANT CHANGE UP (ref 80–100)
MCV RBC AUTO: 95.7 FL — SIGNIFICANT CHANGE UP (ref 80–100)
MCV RBC AUTO: 97.9 FL — SIGNIFICANT CHANGE UP (ref 80–100)
METHOD TYPE: SIGNIFICANT CHANGE UP
MICROCYTES BLD QL: SLIGHT — SIGNIFICANT CHANGE UP
MICROSCOPIC-UA: NORMAL
MONOCYTES # BLD AUTO: 0.5 K/UL — SIGNIFICANT CHANGE UP (ref 0–0.9)
MONOCYTES # BLD AUTO: 0.5 K/UL — SIGNIFICANT CHANGE UP (ref 0–0.9)
MONOCYTES # BLD AUTO: 0.6 K/UL — SIGNIFICANT CHANGE UP (ref 0–0.9)
MONOCYTES # BLD AUTO: 0.68 K/UL — SIGNIFICANT CHANGE UP (ref 0–0.9)
MONOCYTES # BLD AUTO: 0.7 K/UL — SIGNIFICANT CHANGE UP (ref 0–0.9)
MONOCYTES # BLD AUTO: 0.8 K/UL — SIGNIFICANT CHANGE UP (ref 0–0.9)
MONOCYTES # BLD AUTO: 0.8 K/UL — SIGNIFICANT CHANGE UP (ref 0–0.9)
MONOCYTES # BLD AUTO: 0.89 K/UL — SIGNIFICANT CHANGE UP (ref 0–0.9)
MONOCYTES # BLD AUTO: 0.9 K/UL — SIGNIFICANT CHANGE UP (ref 0–0.9)
MONOCYTES # BLD AUTO: 0.9 K/UL — SIGNIFICANT CHANGE UP (ref 0–0.9)
MONOCYTES # BLD AUTO: 0.92 K/UL — HIGH (ref 0–0.9)
MONOCYTES # BLD AUTO: 1 K/UL — HIGH (ref 0–0.9)
MONOCYTES # BLD AUTO: 1.2 K/UL — HIGH (ref 0–0.9)
MONOCYTES NFR BLD AUTO: 10.3 % — SIGNIFICANT CHANGE UP (ref 2–14)
MONOCYTES NFR BLD AUTO: 11.6 % — SIGNIFICANT CHANGE UP (ref 2–14)
MONOCYTES NFR BLD AUTO: 2 % — SIGNIFICANT CHANGE UP (ref 2–14)
MONOCYTES NFR BLD AUTO: 5.2 % — SIGNIFICANT CHANGE UP (ref 2–14)
MONOCYTES NFR BLD AUTO: 6.2 % — SIGNIFICANT CHANGE UP (ref 2–14)
MONOCYTES NFR BLD AUTO: 6.9 % — SIGNIFICANT CHANGE UP (ref 2–14)
MONOCYTES NFR BLD AUTO: 7 % — SIGNIFICANT CHANGE UP (ref 2–14)
MONOCYTES NFR BLD AUTO: 7.4 % — SIGNIFICANT CHANGE UP (ref 2–14)
MONOCYTES NFR BLD AUTO: 7.6 % — SIGNIFICANT CHANGE UP (ref 2–14)
MONOCYTES NFR BLD AUTO: 7.6 % — SIGNIFICANT CHANGE UP (ref 2–14)
MONOCYTES NFR BLD AUTO: 7.7 % — SIGNIFICANT CHANGE UP (ref 2–14)
MONOCYTES NFR BLD AUTO: 7.7 % — SIGNIFICANT CHANGE UP (ref 2–14)
MONOCYTES NFR BLD AUTO: 7.8 % — SIGNIFICANT CHANGE UP (ref 2–14)
MONOCYTES NFR BLD AUTO: 8 % — SIGNIFICANT CHANGE UP (ref 2–14)
MONOCYTES NFR BLD AUTO: 8.3 % — SIGNIFICANT CHANGE UP (ref 2–14)
MONOCYTES NFR BLD AUTO: 8.3 % — SIGNIFICANT CHANGE UP (ref 2–14)
MONOCYTES NFR BLD AUTO: 8.6 % — SIGNIFICANT CHANGE UP (ref 2–14)
MONOCYTES NFR BLD AUTO: 8.9 % — SIGNIFICANT CHANGE UP (ref 2–14)
MONOCYTES NFR BLD AUTO: 9.4 % — SIGNIFICANT CHANGE UP (ref 2–14)
MONOCYTES NFR BLD AUTO: 9.5 % — SIGNIFICANT CHANGE UP (ref 2–14)
NEUTROPHILS # BLD AUTO: 3.9 K/UL — SIGNIFICANT CHANGE UP (ref 1.8–7.4)
NEUTROPHILS # BLD AUTO: 30.2 K/UL — HIGH (ref 1.8–7.4)
NEUTROPHILS # BLD AUTO: 4.3 K/UL — SIGNIFICANT CHANGE UP (ref 1.8–7.4)
NEUTROPHILS # BLD AUTO: 4.5 K/UL — SIGNIFICANT CHANGE UP (ref 1.8–7.4)
NEUTROPHILS # BLD AUTO: 4.8 K/UL — SIGNIFICANT CHANGE UP (ref 1.8–7.4)
NEUTROPHILS # BLD AUTO: 5.1 K/UL — SIGNIFICANT CHANGE UP (ref 1.8–7.4)
NEUTROPHILS # BLD AUTO: 5.4 K/UL — SIGNIFICANT CHANGE UP (ref 1.8–7.4)
NEUTROPHILS # BLD AUTO: 5.8 K/UL — SIGNIFICANT CHANGE UP (ref 1.8–7.4)
NEUTROPHILS # BLD AUTO: 5.8 K/UL — SIGNIFICANT CHANGE UP (ref 1.8–7.4)
NEUTROPHILS # BLD AUTO: 6 K/UL — SIGNIFICANT CHANGE UP (ref 1.8–7.4)
NEUTROPHILS # BLD AUTO: 6 K/UL — SIGNIFICANT CHANGE UP (ref 1.8–7.4)
NEUTROPHILS # BLD AUTO: 6.3 K/UL — SIGNIFICANT CHANGE UP (ref 1.8–7.4)
NEUTROPHILS # BLD AUTO: 6.5 K/UL — SIGNIFICANT CHANGE UP (ref 1.8–7.4)
NEUTROPHILS # BLD AUTO: 6.55 K/UL — SIGNIFICANT CHANGE UP (ref 1.8–7.4)
NEUTROPHILS # BLD AUTO: 7.3 K/UL — SIGNIFICANT CHANGE UP (ref 1.8–7.4)
NEUTROPHILS # BLD AUTO: 7.5 K/UL — HIGH (ref 1.8–7.4)
NEUTROPHILS # BLD AUTO: 7.54 K/UL — HIGH (ref 1.8–7.4)
NEUTROPHILS # BLD AUTO: 8.35 K/UL — HIGH (ref 1.8–7.4)
NEUTROPHILS # BLD AUTO: 8.78 K/UL — HIGH (ref 1.8–7.4)
NEUTROPHILS # BLD AUTO: 9.07 K/UL — HIGH (ref 1.8–7.4)
NEUTROPHILS NFR BLD AUTO: 56.2 % — SIGNIFICANT CHANGE UP (ref 43–77)
NEUTROPHILS NFR BLD AUTO: 62.8 % — SIGNIFICANT CHANGE UP (ref 43–77)
NEUTROPHILS NFR BLD AUTO: 62.8 % — SIGNIFICANT CHANGE UP (ref 43–77)
NEUTROPHILS NFR BLD AUTO: 65.2 % — SIGNIFICANT CHANGE UP (ref 43–77)
NEUTROPHILS NFR BLD AUTO: 65.3 % — SIGNIFICANT CHANGE UP (ref 43–77)
NEUTROPHILS NFR BLD AUTO: 65.5 % — SIGNIFICANT CHANGE UP (ref 43–77)
NEUTROPHILS NFR BLD AUTO: 66.6 % — SIGNIFICANT CHANGE UP (ref 43–77)
NEUTROPHILS NFR BLD AUTO: 68.5 % — SIGNIFICANT CHANGE UP (ref 43–77)
NEUTROPHILS NFR BLD AUTO: 68.9 % — SIGNIFICANT CHANGE UP (ref 43–77)
NEUTROPHILS NFR BLD AUTO: 70.1 % — SIGNIFICANT CHANGE UP (ref 43–77)
NEUTROPHILS NFR BLD AUTO: 70.7 % — SIGNIFICANT CHANGE UP (ref 43–77)
NEUTROPHILS NFR BLD AUTO: 73.2 % — SIGNIFICANT CHANGE UP (ref 43–77)
NEUTROPHILS NFR BLD AUTO: 73.9 % — SIGNIFICANT CHANGE UP (ref 43–77)
NEUTROPHILS NFR BLD AUTO: 74.2 % — SIGNIFICANT CHANGE UP (ref 43–77)
NEUTROPHILS NFR BLD AUTO: 74.6 % — SIGNIFICANT CHANGE UP (ref 43–77)
NEUTROPHILS NFR BLD AUTO: 75.3 % — SIGNIFICANT CHANGE UP (ref 43–77)
NEUTROPHILS NFR BLD AUTO: 75.6 % — SIGNIFICANT CHANGE UP (ref 43–77)
NEUTROPHILS NFR BLD AUTO: 76.3 % — SIGNIFICANT CHANGE UP (ref 43–77)
NEUTROPHILS NFR BLD AUTO: 76.7 % — SIGNIFICANT CHANGE UP (ref 43–77)
NEUTROPHILS NFR BLD AUTO: 87 % — HIGH (ref 43–77)
NITRITE UR-MCNC: POSITIVE
NITRITE URINE: POSITIVE
OB PNL STL: POSITIVE
ORGANISM # SPEC MICROSCOPIC CNT: SIGNIFICANT CHANGE UP
ORGANISM # SPEC MICROSCOPIC CNT: SIGNIFICANT CHANGE UP
OVALOCYTES BLD QL SMEAR: SLIGHT — SIGNIFICANT CHANGE UP
OVALOCYTES BLD QL SMEAR: SLIGHT — SIGNIFICANT CHANGE UP
PH UR: 8 — SIGNIFICANT CHANGE UP (ref 5–8)
PH URINE: 8
PHOSPHATE SERPL-MCNC: 3.6 MG/DL — SIGNIFICANT CHANGE UP (ref 2.4–4.7)
PLAT MORPH BLD: NORMAL — SIGNIFICANT CHANGE UP
PLAT MORPH BLD: NORMAL — SIGNIFICANT CHANGE UP
PLATELET # BLD AUTO: 121 K/UL — LOW (ref 150–400)
PLATELET # BLD AUTO: 136 K/UL — LOW (ref 150–400)
PLATELET # BLD AUTO: 223 K/UL — SIGNIFICANT CHANGE UP (ref 150–400)
PLATELET # BLD AUTO: 223 K/UL — SIGNIFICANT CHANGE UP (ref 150–400)
PLATELET # BLD AUTO: 239 K/UL — SIGNIFICANT CHANGE UP (ref 150–400)
PLATELET # BLD AUTO: 254 K/UL — SIGNIFICANT CHANGE UP (ref 150–400)
PLATELET # BLD AUTO: 261 K/UL — SIGNIFICANT CHANGE UP (ref 150–400)
PLATELET # BLD AUTO: 261 K/UL — SIGNIFICANT CHANGE UP (ref 150–400)
PLATELET # BLD AUTO: 269 K/UL — SIGNIFICANT CHANGE UP (ref 150–400)
PLATELET # BLD AUTO: 280 K/UL — SIGNIFICANT CHANGE UP (ref 150–400)
PLATELET # BLD AUTO: 281 K/UL — SIGNIFICANT CHANGE UP (ref 150–400)
PLATELET # BLD AUTO: 283 K/UL — SIGNIFICANT CHANGE UP (ref 150–400)
PLATELET # BLD AUTO: 287 K/UL — SIGNIFICANT CHANGE UP (ref 150–400)
PLATELET # BLD AUTO: 295 K/UL — SIGNIFICANT CHANGE UP (ref 150–400)
PLATELET # BLD AUTO: 303 K/UL — SIGNIFICANT CHANGE UP (ref 150–400)
PLATELET # BLD AUTO: 315 K/UL — SIGNIFICANT CHANGE UP (ref 150–400)
PLATELET # BLD AUTO: 328 K/UL — SIGNIFICANT CHANGE UP (ref 150–400)
PLATELET # BLD AUTO: 336 K/UL — SIGNIFICANT CHANGE UP (ref 150–400)
PLATELET # BLD AUTO: 340 K/UL — SIGNIFICANT CHANGE UP (ref 150–400)
PLATELET # BLD AUTO: 345 K/UL — SIGNIFICANT CHANGE UP (ref 150–400)
PLATELET # BLD AUTO: 359 K/UL — SIGNIFICANT CHANGE UP (ref 150–400)
POIKILOCYTOSIS BLD QL AUTO: SLIGHT — SIGNIFICANT CHANGE UP
POIKILOCYTOSIS BLD QL AUTO: SLIGHT — SIGNIFICANT CHANGE UP
POLYCHROMASIA BLD QL SMEAR: SLIGHT — SIGNIFICANT CHANGE UP
POLYCHROMASIA BLD QL SMEAR: SLIGHT — SIGNIFICANT CHANGE UP
POTASSIUM SERPL-MCNC: 3 MMOL/L — LOW (ref 3.5–5.3)
POTASSIUM SERPL-MCNC: 3.8 MMOL/L — SIGNIFICANT CHANGE UP (ref 3.5–5.3)
POTASSIUM SERPL-MCNC: 4 MMOL/L — SIGNIFICANT CHANGE UP (ref 3.5–5.3)
POTASSIUM SERPL-MCNC: 4.1 MMOL/L — SIGNIFICANT CHANGE UP (ref 3.5–5.3)
POTASSIUM SERPL-MCNC: 4.4 MMOL/L — SIGNIFICANT CHANGE UP (ref 3.5–5.3)
POTASSIUM SERPL-SCNC: 3 MMOL/L — LOW (ref 3.5–5.3)
POTASSIUM SERPL-SCNC: 3.8 MMOL/L — SIGNIFICANT CHANGE UP (ref 3.5–5.3)
POTASSIUM SERPL-SCNC: 4 MMOL/L — SIGNIFICANT CHANGE UP (ref 3.5–5.3)
POTASSIUM SERPL-SCNC: 4.1 MMOL/L — SIGNIFICANT CHANGE UP (ref 3.5–5.3)
POTASSIUM SERPL-SCNC: 4.4 MMOL/L — SIGNIFICANT CHANGE UP (ref 3.5–5.3)
PROT SERPL-MCNC: 7 G/DL — SIGNIFICANT CHANGE UP (ref 6.6–8.7)
PROT UR-MCNC: 100
PROTEIN URINE: ABNORMAL
PROTHROM AB SERPL-ACNC: 15 SEC — HIGH (ref 10–12.9)
PROTHROM AB SERPL-ACNC: 15.4 SEC — HIGH (ref 10.6–13.6)
RBC # BLD: 2.15 M/UL — LOW (ref 3.8–5.2)
RBC # BLD: 2.24 M/UL — LOW (ref 3.8–5.2)
RBC # BLD: 2.56 M/UL — LOW (ref 3.8–5.2)
RBC # BLD: 2.93 M/UL — LOW (ref 3.8–5.2)
RBC # BLD: 2.98 M/UL — LOW (ref 3.8–5.2)
RBC # BLD: 3.01 M/UL — LOW (ref 3.8–5.2)
RBC # BLD: 3.06 M/UL — LOW (ref 3.8–5.2)
RBC # BLD: 3.11 M/UL — LOW (ref 3.8–5.2)
RBC # BLD: 3.16 M/UL — LOW (ref 3.8–5.2)
RBC # BLD: 3.22 M/UL — LOW (ref 3.8–5.2)
RBC # BLD: 3.24 M/UL — LOW (ref 3.8–5.2)
RBC # BLD: 3.34 M/UL — LOW (ref 3.8–5.2)
RBC # BLD: 3.36 M/UL — LOW (ref 3.8–5.2)
RBC # BLD: 3.37 M/UL — LOW (ref 3.8–5.2)
RBC # BLD: 3.37 M/UL — LOW (ref 3.8–5.2)
RBC # BLD: 3.4 M/UL — LOW (ref 3.8–5.2)
RBC # BLD: 3.46 M/UL — LOW (ref 3.8–5.2)
RBC # BLD: 3.58 M/UL — LOW (ref 3.8–5.2)
RBC # BLD: 3.76 M/UL — LOW (ref 3.8–5.2)
RBC # BLD: 3.79 M/UL — LOW (ref 3.8–5.2)
RBC # BLD: 3.9 M/UL — SIGNIFICANT CHANGE UP (ref 3.8–5.2)
RBC # FLD: 14 % — SIGNIFICANT CHANGE UP (ref 10.3–14.5)
RBC # FLD: 14 % — SIGNIFICANT CHANGE UP (ref 10.3–14.5)
RBC # FLD: 14.3 % — SIGNIFICANT CHANGE UP (ref 10.3–14.5)
RBC # FLD: 14.3 % — SIGNIFICANT CHANGE UP (ref 10.3–14.5)
RBC # FLD: 14.5 % — SIGNIFICANT CHANGE UP (ref 10.3–14.5)
RBC # FLD: 14.6 % — HIGH (ref 10.3–14.5)
RBC # FLD: 14.7 % — HIGH (ref 10.3–14.5)
RBC # FLD: 14.8 % — HIGH (ref 10.3–14.5)
RBC # FLD: 15.2 % — HIGH (ref 10.3–14.5)
RBC # FLD: 15.4 % — HIGH (ref 10.3–14.5)
RBC # FLD: 15.5 % — HIGH (ref 10.3–14.5)
RBC # FLD: 16.1 % — HIGH (ref 10.3–14.5)
RBC # FLD: 17.6 % — HIGH (ref 10.3–14.5)
RBC # FLD: 19.6 % — HIGH (ref 10.3–14.5)
RBC # FLD: 19.6 % — HIGH (ref 10.3–14.5)
RBC # FLD: 20.7 % — HIGH (ref 10.3–14.5)
RBC # FLD: 22.8 % — HIGH (ref 10.3–14.5)
RBC BLD AUTO: ABNORMAL
RBC BLD AUTO: SIGNIFICANT CHANGE UP
RBC CASTS # UR COMP ASSIST: SIGNIFICANT CHANGE UP /HPF (ref 0–4)
RED BLOOD CELLS URINE: 1 /HPF
SARS-COV-2 IGG SERPL QL IA: NEGATIVE — SIGNIFICANT CHANGE UP
SARS-COV-2 IGM SERPL IA-ACNC: 0.07 INDEX — SIGNIFICANT CHANGE UP
SARS-COV-2 RNA SPEC QL NAA+PROBE: SIGNIFICANT CHANGE UP
SCHISTOCYTES BLD QL AUTO: SLIGHT — SIGNIFICANT CHANGE UP
SODIUM SERPL-SCNC: 129 MMOL/L — LOW (ref 135–145)
SODIUM SERPL-SCNC: 131 MMOL/L — LOW (ref 135–145)
SODIUM SERPL-SCNC: 133 MMOL/L — LOW (ref 135–145)
SODIUM SERPL-SCNC: 135 MMOL/L — SIGNIFICANT CHANGE UP (ref 135–145)
SODIUM SERPL-SCNC: 136 MMOL/L — SIGNIFICANT CHANGE UP (ref 135–145)
SP GR SPEC: 1.01 — SIGNIFICANT CHANGE UP (ref 1.01–1.02)
SPECIFIC GRAVITY URINE: 1.02
SPECIMEN SOURCE: SIGNIFICANT CHANGE UP
SQUAMOUS EPITHELIAL CELLS: 2 /HPF
STOMATOCYTES BLD QL SMEAR: PRESENT — SIGNIFICANT CHANGE UP
TOXIC GRANULES BLD QL SMEAR: PRESENT — SIGNIFICANT CHANGE UP
TRIPLE PHOSPHATE CRYSTALS: ABNORMAL
UROBILINOGEN FLD QL: NEGATIVE — SIGNIFICANT CHANGE UP
UROBILINOGEN URINE: NORMAL
VARIANT LYMPHS # BLD: 1 % — SIGNIFICANT CHANGE UP (ref 0–6)
WBC # BLD: 10.2 K/UL — SIGNIFICANT CHANGE UP (ref 3.8–10.5)
WBC # BLD: 10.95 K/UL — HIGH (ref 3.8–10.5)
WBC # BLD: 11.51 K/UL — HIGH (ref 3.8–10.5)
WBC # BLD: 11.64 K/UL — HIGH (ref 3.8–10.5)
WBC # BLD: 12.14 K/UL — HIGH (ref 3.8–10.5)
WBC # BLD: 12.84 K/UL — HIGH (ref 3.8–10.5)
WBC # BLD: 34.8 K/UL — HIGH (ref 3.8–10.5)
WBC # BLD: 6 K/UL — SIGNIFICANT CHANGE UP (ref 3.8–10.5)
WBC # BLD: 6.1 K/UL — SIGNIFICANT CHANGE UP (ref 3.8–10.5)
WBC # BLD: 7.6 K/UL — SIGNIFICANT CHANGE UP (ref 3.8–10.5)
WBC # BLD: 7.8 K/UL — SIGNIFICANT CHANGE UP (ref 3.8–10.5)
WBC # BLD: 7.9 K/UL — SIGNIFICANT CHANGE UP (ref 3.8–10.5)
WBC # BLD: 8 K/UL — SIGNIFICANT CHANGE UP (ref 3.8–10.5)
WBC # BLD: 8.2 K/UL — SIGNIFICANT CHANGE UP (ref 3.8–10.5)
WBC # BLD: 8.5 K/UL — SIGNIFICANT CHANGE UP (ref 3.8–10.5)
WBC # BLD: 8.7 K/UL — SIGNIFICANT CHANGE UP (ref 3.8–10.5)
WBC # BLD: 8.8 K/UL — SIGNIFICANT CHANGE UP (ref 3.8–10.5)
WBC # BLD: 9.9 K/UL — SIGNIFICANT CHANGE UP (ref 3.8–10.5)
WBC # FLD AUTO: 10.2 K/UL — SIGNIFICANT CHANGE UP (ref 3.8–10.5)
WBC # FLD AUTO: 10.95 K/UL — HIGH (ref 3.8–10.5)
WBC # FLD AUTO: 11.51 K/UL — HIGH (ref 3.8–10.5)
WBC # FLD AUTO: 11.64 K/UL — HIGH (ref 3.8–10.5)
WBC # FLD AUTO: 12.14 K/UL — HIGH (ref 3.8–10.5)
WBC # FLD AUTO: 12.84 K/UL — HIGH (ref 3.8–10.5)
WBC # FLD AUTO: 34.8 K/UL — HIGH (ref 3.8–10.5)
WBC # FLD AUTO: 6 K/UL — SIGNIFICANT CHANGE UP (ref 3.8–10.5)
WBC # FLD AUTO: 6.1 K/UL — SIGNIFICANT CHANGE UP (ref 3.8–10.5)
WBC # FLD AUTO: 7.6 K/UL — SIGNIFICANT CHANGE UP (ref 3.8–10.5)
WBC # FLD AUTO: 7.8 K/UL — SIGNIFICANT CHANGE UP (ref 3.8–10.5)
WBC # FLD AUTO: 7.9 K/UL — SIGNIFICANT CHANGE UP (ref 3.8–10.5)
WBC # FLD AUTO: 8 K/UL — SIGNIFICANT CHANGE UP (ref 3.8–10.5)
WBC # FLD AUTO: 8.2 K/UL — SIGNIFICANT CHANGE UP (ref 3.8–10.5)
WBC # FLD AUTO: 8.5 K/UL — SIGNIFICANT CHANGE UP (ref 3.8–10.5)
WBC # FLD AUTO: 8.7 K/UL — SIGNIFICANT CHANGE UP (ref 3.8–10.5)
WBC # FLD AUTO: 8.8 K/UL — SIGNIFICANT CHANGE UP (ref 3.8–10.5)
WBC # FLD AUTO: 9.9 K/UL — SIGNIFICANT CHANGE UP (ref 3.8–10.5)
WBC UR QL: ABNORMAL
WHITE BLOOD CELLS URINE: 30 /HPF

## 2020-01-01 PROCEDURE — 36430 TRANSFUSION BLD/BLD COMPNT: CPT

## 2020-01-01 PROCEDURE — 72193 CT PELVIS W/DYE: CPT | Mod: 26

## 2020-01-01 PROCEDURE — 74177 CT ABD & PELVIS W/CONTRAST: CPT | Mod: 26

## 2020-01-01 PROCEDURE — 99285 EMERGENCY DEPT VISIT HI MDM: CPT | Mod: 25,GC

## 2020-01-01 PROCEDURE — 82565 ASSAY OF CREATININE: CPT

## 2020-01-01 PROCEDURE — 86803 HEPATITIS C AB TEST: CPT

## 2020-01-01 PROCEDURE — 86900 BLOOD TYPING SEROLOGIC ABO: CPT

## 2020-01-01 PROCEDURE — 84100 ASSAY OF PHOSPHORUS: CPT

## 2020-01-01 PROCEDURE — 99214 OFFICE O/P EST MOD 30 MIN: CPT

## 2020-01-01 PROCEDURE — 99223 1ST HOSP IP/OBS HIGH 75: CPT

## 2020-01-01 PROCEDURE — 86923 COMPATIBILITY TEST ELECTRIC: CPT

## 2020-01-01 PROCEDURE — 74177 CT ABD & PELVIS W/CONTRAST: CPT

## 2020-01-01 PROCEDURE — 99214 OFFICE O/P EST MOD 30 MIN: CPT | Mod: 95

## 2020-01-01 PROCEDURE — 36415 COLL VENOUS BLD VENIPUNCTURE: CPT

## 2020-01-01 PROCEDURE — 86850 RBC ANTIBODY SCREEN: CPT

## 2020-01-01 PROCEDURE — 74174 CTA ABD&PLVS W/CONTRAST: CPT | Mod: 26

## 2020-01-01 PROCEDURE — 80053 COMPREHEN METABOLIC PANEL: CPT

## 2020-01-01 PROCEDURE — 99233 SBSQ HOSP IP/OBS HIGH 50: CPT

## 2020-01-01 PROCEDURE — U0003: CPT

## 2020-01-01 PROCEDURE — C1729: CPT

## 2020-01-01 PROCEDURE — 87086 URINE CULTURE/COLONY COUNT: CPT

## 2020-01-01 PROCEDURE — 83605 ASSAY OF LACTIC ACID: CPT

## 2020-01-01 PROCEDURE — 85610 PROTHROMBIN TIME: CPT

## 2020-01-01 PROCEDURE — P9016: CPT

## 2020-01-01 PROCEDURE — 99285 EMERGENCY DEPT VISIT HI MDM: CPT | Mod: 25

## 2020-01-01 PROCEDURE — 93970 EXTREMITY STUDY: CPT

## 2020-01-01 PROCEDURE — 86901 BLOOD TYPING SEROLOGIC RH(D): CPT

## 2020-01-01 PROCEDURE — 85027 COMPLETE CBC AUTOMATED: CPT

## 2020-01-01 PROCEDURE — 82272 OCCULT BLD FECES 1-3 TESTS: CPT

## 2020-01-01 PROCEDURE — 96365 THER/PROPH/DIAG IV INF INIT: CPT | Mod: XU

## 2020-01-01 PROCEDURE — 99213 OFFICE O/P EST LOW 20 MIN: CPT

## 2020-01-01 PROCEDURE — 99497 ADVNCD CARE PLAN 30 MIN: CPT

## 2020-01-01 PROCEDURE — 50435 EXCHANGE NEPHROSTOMY CATH: CPT | Mod: 50

## 2020-01-01 PROCEDURE — 72193 CT PELVIS W/DYE: CPT

## 2020-01-01 PROCEDURE — 81001 URINALYSIS AUTO W/SCOPE: CPT

## 2020-01-01 PROCEDURE — 50432 PLMT NEPHROSTOMY CATHETER: CPT | Mod: 50

## 2020-01-01 PROCEDURE — 86769 SARS-COV-2 COVID-19 ANTIBODY: CPT

## 2020-01-01 PROCEDURE — 71260 CT THORAX DX C+: CPT | Mod: 26

## 2020-01-01 PROCEDURE — 99233 SBSQ HOSP IP/OBS HIGH 50: CPT | Mod: GC

## 2020-01-01 PROCEDURE — C1769: CPT

## 2020-01-01 PROCEDURE — 99221 1ST HOSP IP/OBS SF/LOW 40: CPT

## 2020-01-01 PROCEDURE — 99497 ADVNCD CARE PLAN 30 MIN: CPT | Mod: 25

## 2020-01-01 PROCEDURE — 87186 SC STD MICRODIL/AGAR DIL: CPT

## 2020-01-01 PROCEDURE — 99441: CPT | Mod: 95

## 2020-01-01 PROCEDURE — 82040 ASSAY OF SERUM ALBUMIN: CPT

## 2020-01-01 PROCEDURE — 93010 ELECTROCARDIOGRAM REPORT: CPT

## 2020-01-01 PROCEDURE — 80048 BASIC METABOLIC PNL TOTAL CA: CPT

## 2020-01-01 PROCEDURE — 72197 MRI PELVIS W/O & W/DYE: CPT | Mod: 26

## 2020-01-01 PROCEDURE — 71260 CT THORAX DX C+: CPT

## 2020-01-01 PROCEDURE — 71045 X-RAY EXAM CHEST 1 VIEW: CPT | Mod: 26

## 2020-01-01 PROCEDURE — 99499A: CUSTOM | Mod: NC

## 2020-01-01 PROCEDURE — 87040 BLOOD CULTURE FOR BACTERIA: CPT

## 2020-01-01 PROCEDURE — 50432 PLMT NEPHROSTOMY CATHETER: CPT

## 2020-01-01 PROCEDURE — 71045 X-RAY EXAM CHEST 1 VIEW: CPT

## 2020-01-01 PROCEDURE — 93970 EXTREMITY STUDY: CPT | Mod: 26

## 2020-01-01 PROCEDURE — 85730 THROMBOPLASTIN TIME PARTIAL: CPT

## 2020-01-01 PROCEDURE — 83690 ASSAY OF LIPASE: CPT

## 2020-01-01 PROCEDURE — 93005 ELECTROCARDIOGRAM TRACING: CPT

## 2020-01-01 PROCEDURE — 83735 ASSAY OF MAGNESIUM: CPT

## 2020-01-01 PROCEDURE — 76942 ECHO GUIDE FOR BIOPSY: CPT

## 2020-01-01 PROCEDURE — 99212 OFFICE O/P EST SF 10 MIN: CPT

## 2020-01-01 PROCEDURE — 99442: CPT | Mod: CR

## 2020-01-01 PROCEDURE — 74174 CTA ABD&PLVS W/CONTRAST: CPT

## 2020-01-01 RX ORDER — PYRIDOXINE HCL (VITAMIN B6) 100 MG
1 TABLET ORAL
Qty: 0 | Refills: 0 | DISCHARGE

## 2020-01-01 RX ORDER — DIPHENHYDRAMINE HYDROCHLORIDE AND LIDOCAINE HYDROCHLORIDE AND ALUMINUM HYDROXIDE AND MAGNESIUM HYDRO
KIT
Qty: 119 | Refills: 3 | Status: DISCONTINUED | COMMUNITY
Start: 2019-01-01 | End: 2020-01-01

## 2020-01-01 RX ORDER — LACTULOSE 10 G/15ML
10 SOLUTION ORAL
Qty: 237 | Refills: 2 | Status: DISCONTINUED | COMMUNITY
Start: 2019-01-01 | End: 2020-01-01

## 2020-01-01 RX ORDER — GABAPENTIN 300 MG/1
300 CAPSULE ORAL TWICE DAILY
Qty: 60 | Refills: 3 | Status: ACTIVE | COMMUNITY
Start: 2019-06-07

## 2020-01-01 RX ORDER — OXYCODONE HYDROCHLORIDE 5 MG/1
10 TABLET ORAL EVERY 6 HOURS
Refills: 0 | Status: DISCONTINUED | OUTPATIENT
Start: 2020-01-01 | End: 2020-01-01

## 2020-01-01 RX ORDER — MORPHINE SULFATE 50 MG/1
4 CAPSULE, EXTENDED RELEASE ORAL ONCE
Refills: 0 | Status: DISCONTINUED | OUTPATIENT
Start: 2020-01-01 | End: 2020-01-01

## 2020-01-01 RX ORDER — CEFPODOXIME PROXETIL 200 MG/1
200 TABLET, FILM COATED ORAL
Qty: 20 | Refills: 0 | Status: DISCONTINUED | COMMUNITY
Start: 2020-01-01 | End: 2020-01-01

## 2020-01-01 RX ORDER — OXYBUTYNIN CHLORIDE 5 MG
1 TABLET ORAL
Qty: 0 | Refills: 0 | DISCHARGE

## 2020-01-01 RX ORDER — DRONABINOL 2.5 MG
2.5 CAPSULE ORAL
Refills: 0 | Status: DISCONTINUED | OUTPATIENT
Start: 2020-01-01 | End: 2020-01-01

## 2020-01-01 RX ORDER — PROCHLORPERAZINE MALEATE 10 MG/1
10 TABLET ORAL
Qty: 30 | Refills: 1 | Status: DISCONTINUED | COMMUNITY
Start: 2018-05-04 | End: 2020-01-01

## 2020-01-01 RX ORDER — ACETAMINOPHEN 500 MG
650 TABLET ORAL EVERY 6 HOURS
Refills: 0 | Status: DISCONTINUED | OUTPATIENT
Start: 2020-01-01 | End: 2020-01-01

## 2020-01-01 RX ORDER — MEROPENEM 1 G/30ML
1000 INJECTION INTRAVENOUS ONCE
Refills: 0 | Status: COMPLETED | OUTPATIENT
Start: 2020-01-01 | End: 2020-01-01

## 2020-01-01 RX ORDER — OXYCODONE HYDROCHLORIDE 5 MG/1
5 TABLET ORAL EVERY 6 HOURS
Refills: 0 | Status: DISCONTINUED | OUTPATIENT
Start: 2020-01-01 | End: 2020-01-01

## 2020-01-01 RX ORDER — FERROUS SULFATE 325(65) MG
325 (65 FE) TABLET ORAL DAILY
Qty: 90 | Refills: 0 | Status: ACTIVE | COMMUNITY
Start: 2020-01-01 | End: 1900-01-01

## 2020-01-01 RX ORDER — LEVOFLOXACIN 500 MG/1
500 TABLET, FILM COATED ORAL
Qty: 10 | Refills: 1 | Status: DISCONTINUED | COMMUNITY
Start: 2020-01-01 | End: 2020-01-01

## 2020-01-01 RX ORDER — CALCIUM CARBONATE/VITAMIN D3 600 MG-20
100 TABLET ORAL DAILY
Qty: 90 | Refills: 2 | Status: ACTIVE | COMMUNITY
Start: 2018-06-22 | End: 1900-01-01

## 2020-01-01 RX ORDER — OXYBUTYNIN CHLORIDE 5 MG/1
5 TABLET ORAL TWICE DAILY
Qty: 60 | Refills: 1 | Status: DISCONTINUED | COMMUNITY
Start: 2019-01-01 | End: 2020-01-01

## 2020-01-01 RX ORDER — METRONIDAZOLE 500 MG/1
500 TABLET ORAL TWICE DAILY
Qty: 10 | Refills: 0 | Status: ACTIVE | COMMUNITY
Start: 2020-01-01 | End: 1900-01-01

## 2020-01-01 RX ORDER — SODIUM CHLORIDE 9 MG/ML
1000 INJECTION INTRAMUSCULAR; INTRAVENOUS; SUBCUTANEOUS ONCE
Refills: 0 | Status: COMPLETED | OUTPATIENT
Start: 2020-01-01 | End: 2020-01-01

## 2020-01-01 RX ORDER — AMOXICILLIN AND CLAVULANATE POTASSIUM 875; 125 MG/1; MG/1
875-125 TABLET, COATED ORAL
Qty: 14 | Refills: 0 | Status: DISCONTINUED | COMMUNITY
Start: 2020-01-01 | End: 2020-01-01

## 2020-01-01 RX ORDER — ONDANSETRON 8 MG/1
8 TABLET ORAL
Qty: 15 | Refills: 0 | Status: DISCONTINUED | COMMUNITY
Start: 2018-05-04 | End: 2020-01-01

## 2020-01-01 RX ORDER — OXYCODONE HYDROCHLORIDE 5 MG/1
5 TABLET ORAL EVERY 4 HOURS
Refills: 0 | Status: DISCONTINUED | OUTPATIENT
Start: 2020-01-01 | End: 2020-01-01

## 2020-01-01 RX ORDER — PYRIDOXINE HCL (VITAMIN B6) 100 MG
100 TABLET ORAL DAILY
Refills: 0 | Status: DISCONTINUED | OUTPATIENT
Start: 2020-01-01 | End: 2020-01-01

## 2020-01-01 RX ORDER — CIPROFLOXACIN HYDROCHLORIDE 500 MG/1
500 TABLET, FILM COATED ORAL TWICE DAILY
Qty: 10 | Refills: 0 | Status: ACTIVE | COMMUNITY
Start: 2020-01-01 | End: 1900-01-01

## 2020-01-01 RX ORDER — CIPROFLOXACIN HYDROCHLORIDE 250 MG/1
250 TABLET, FILM COATED ORAL
Qty: 14 | Refills: 0 | Status: DISCONTINUED | COMMUNITY
Start: 2020-01-01 | End: 2020-01-01

## 2020-01-01 RX ORDER — OXYCODONE HYDROCHLORIDE 5 MG/1
1 TABLET ORAL
Qty: 42 | Refills: 0
Start: 2020-01-01 | End: 2020-01-01

## 2020-01-01 RX ORDER — MORPHINE SULFATE 50 MG/1
2 CAPSULE, EXTENDED RELEASE ORAL EVERY 4 HOURS
Refills: 0 | Status: DISCONTINUED | OUTPATIENT
Start: 2020-01-01 | End: 2020-01-01

## 2020-01-01 RX ORDER — POTASSIUM CHLORIDE 20 MEQ
40 PACKET (EA) ORAL EVERY 4 HOURS
Refills: 0 | Status: COMPLETED | OUTPATIENT
Start: 2020-01-01 | End: 2020-01-01

## 2020-01-01 RX ORDER — CEFTRIAXONE 500 MG/1
2000 INJECTION, POWDER, FOR SOLUTION INTRAMUSCULAR; INTRAVENOUS ONCE
Refills: 0 | Status: DISCONTINUED | OUTPATIENT
Start: 2020-01-01 | End: 2020-01-01

## 2020-01-01 RX ORDER — MORPHINE SULFATE 50 MG/1
4 CAPSULE, EXTENDED RELEASE ORAL EVERY 4 HOURS
Refills: 0 | Status: DISCONTINUED | OUTPATIENT
Start: 2020-01-01 | End: 2020-01-01

## 2020-01-01 RX ORDER — MIRTAZAPINE 7.5 MG/1
7.5 TABLET, FILM COATED ORAL
Qty: 45 | Refills: 0 | Status: DISCONTINUED | COMMUNITY
Start: 2020-01-01 | End: 2020-01-01

## 2020-01-01 RX ORDER — FOLIC ACID 1 MG/1
1 TABLET ORAL DAILY
Qty: 90 | Refills: 2 | Status: ACTIVE | COMMUNITY
Start: 2020-01-01 | End: 1900-01-01

## 2020-01-01 RX ORDER — ONDANSETRON 8 MG/1
4 TABLET, FILM COATED ORAL ONCE
Refills: 0 | Status: COMPLETED | OUTPATIENT
Start: 2020-01-01 | End: 2020-01-01

## 2020-01-01 RX ORDER — PREGABALIN 225 MG/1
1000 CAPSULE ORAL DAILY
Refills: 0 | Status: DISCONTINUED | OUTPATIENT
Start: 2020-01-01 | End: 2020-01-01

## 2020-01-01 RX ORDER — OXYCODONE AND ACETAMINOPHEN 5; 325 MG/1; MG/1
5-325 TABLET ORAL
Qty: 90 | Refills: 0 | Status: ACTIVE | COMMUNITY
Start: 2020-01-01 | End: 1900-01-01

## 2020-01-01 RX ORDER — DRONABINOL 2.5 MG/1
2.5 CAPSULE ORAL
Qty: 60 | Refills: 0 | Status: ACTIVE | COMMUNITY
Start: 2020-01-01 | End: 1900-01-01

## 2020-01-01 RX ORDER — GABAPENTIN 400 MG/1
300 CAPSULE ORAL
Refills: 0 | Status: DISCONTINUED | OUTPATIENT
Start: 2020-01-01 | End: 2020-01-01

## 2020-01-01 RX ORDER — MAGNESIUM OXIDE 241.3 MG/1000MG
400 TABLET ORAL DAILY
Qty: 30 | Refills: 2 | Status: ACTIVE | COMMUNITY
Start: 2020-01-01 | End: 1900-01-01

## 2020-01-01 RX ORDER — DRONABINOL 2.5 MG
1 CAPSULE ORAL
Qty: 0 | Refills: 0 | DISCHARGE

## 2020-01-01 RX ORDER — MAGNESIUM SULFATE 500 MG/ML
1 VIAL (ML) INJECTION EVERY 6 HOURS
Refills: 0 | Status: COMPLETED | OUTPATIENT
Start: 2020-01-01 | End: 2020-01-01

## 2020-01-01 RX ORDER — DEXAMETHASONE 2 MG/1
2 TABLET ORAL
Qty: 9 | Refills: 0 | Status: DISCONTINUED | COMMUNITY
Start: 2020-01-01 | End: 2020-01-01

## 2020-01-01 RX ORDER — MAGNESIUM SULFATE 500 MG/ML
1 VIAL (ML) INJECTION ONCE
Refills: 0 | Status: COMPLETED | OUTPATIENT
Start: 2020-01-01 | End: 2020-01-01

## 2020-01-01 RX ORDER — NITROFURANTOIN MACROCRYSTAL 50 MG
100 CAPSULE ORAL
Refills: 0 | Status: DISCONTINUED | OUTPATIENT
Start: 2020-01-01 | End: 2020-01-01

## 2020-01-01 RX ADMIN — Medication 650 MILLIGRAM(S): at 00:24

## 2020-01-01 RX ADMIN — MORPHINE SULFATE 4 MILLIGRAM(S): 50 CAPSULE, EXTENDED RELEASE ORAL at 18:23

## 2020-01-01 RX ADMIN — MORPHINE SULFATE 4 MILLIGRAM(S): 50 CAPSULE, EXTENDED RELEASE ORAL at 23:05

## 2020-01-01 RX ADMIN — MORPHINE SULFATE 4 MILLIGRAM(S): 50 CAPSULE, EXTENDED RELEASE ORAL at 10:52

## 2020-01-01 RX ADMIN — PREGABALIN 1000 MICROGRAM(S): 225 CAPSULE ORAL at 11:57

## 2020-01-01 RX ADMIN — PREGABALIN 1000 MICROGRAM(S): 225 CAPSULE ORAL at 10:08

## 2020-01-01 RX ADMIN — Medication 100 MILLIGRAM(S): at 12:24

## 2020-01-01 RX ADMIN — Medication 50 GRAM(S): at 18:27

## 2020-01-01 RX ADMIN — Medication 40 MILLIEQUIVALENT(S): at 11:57

## 2020-01-01 RX ADMIN — Medication 100 MILLIGRAM(S): at 11:57

## 2020-01-01 RX ADMIN — Medication 2.5 MILLIGRAM(S): at 05:07

## 2020-01-01 RX ADMIN — MORPHINE SULFATE 2 MILLIGRAM(S): 50 CAPSULE, EXTENDED RELEASE ORAL at 05:07

## 2020-01-01 RX ADMIN — GABAPENTIN 300 MILLIGRAM(S): 400 CAPSULE ORAL at 05:07

## 2020-01-01 RX ADMIN — GABAPENTIN 300 MILLIGRAM(S): 400 CAPSULE ORAL at 17:31

## 2020-01-01 RX ADMIN — MORPHINE SULFATE 2 MILLIGRAM(S): 50 CAPSULE, EXTENDED RELEASE ORAL at 05:22

## 2020-01-01 RX ADMIN — GABAPENTIN 300 MILLIGRAM(S): 400 CAPSULE ORAL at 05:10

## 2020-01-01 RX ADMIN — Medication 50 GRAM(S): at 23:35

## 2020-01-01 RX ADMIN — GABAPENTIN 300 MILLIGRAM(S): 400 CAPSULE ORAL at 17:40

## 2020-01-01 RX ADMIN — Medication 100 MILLIGRAM(S): at 08:12

## 2020-01-01 RX ADMIN — Medication 100 MILLIGRAM(S): at 11:19

## 2020-01-01 RX ADMIN — OXYCODONE HYDROCHLORIDE 5 MILLIGRAM(S): 5 TABLET ORAL at 12:10

## 2020-01-01 RX ADMIN — SODIUM CHLORIDE 1000 MILLILITER(S): 9 INJECTION INTRAMUSCULAR; INTRAVENOUS; SUBCUTANEOUS at 21:48

## 2020-01-01 RX ADMIN — Medication 100 GRAM(S): at 12:24

## 2020-01-01 RX ADMIN — MORPHINE SULFATE 4 MILLIGRAM(S): 50 CAPSULE, EXTENDED RELEASE ORAL at 10:09

## 2020-01-01 RX ADMIN — Medication 100 MILLIGRAM(S): at 17:11

## 2020-01-01 RX ADMIN — OXYCODONE HYDROCHLORIDE 5 MILLIGRAM(S): 5 TABLET ORAL at 11:18

## 2020-01-01 RX ADMIN — SODIUM CHLORIDE 1000 MILLILITER(S): 9 INJECTION INTRAMUSCULAR; INTRAVENOUS; SUBCUTANEOUS at 20:48

## 2020-01-01 RX ADMIN — MEROPENEM 100 MILLIGRAM(S): 1 INJECTION INTRAVENOUS at 20:57

## 2020-01-01 RX ADMIN — MORPHINE SULFATE 4 MILLIGRAM(S): 50 CAPSULE, EXTENDED RELEASE ORAL at 03:40

## 2020-01-01 RX ADMIN — PREGABALIN 1000 MICROGRAM(S): 225 CAPSULE ORAL at 11:19

## 2020-01-01 RX ADMIN — Medication 40 MILLIEQUIVALENT(S): at 08:11

## 2020-01-01 RX ADMIN — Medication 650 MILLIGRAM(S): at 06:00

## 2020-01-01 RX ADMIN — MEROPENEM 1000 MILLIGRAM(S): 1 INJECTION INTRAVENOUS at 21:48

## 2020-01-01 RX ADMIN — Medication 650 MILLIGRAM(S): at 12:22

## 2020-01-01 RX ADMIN — ONDANSETRON 4 MILLIGRAM(S): 8 TABLET, FILM COATED ORAL at 23:05

## 2020-01-01 RX ADMIN — Medication 650 MILLIGRAM(S): at 05:10

## 2020-01-01 RX ADMIN — MORPHINE SULFATE 4 MILLIGRAM(S): 50 CAPSULE, EXTENDED RELEASE ORAL at 17:53

## 2020-01-01 RX ADMIN — Medication 2.5 MILLIGRAM(S): at 17:40

## 2020-01-01 RX ADMIN — Medication 2.5 MILLIGRAM(S): at 17:11

## 2020-01-01 RX ADMIN — GABAPENTIN 300 MILLIGRAM(S): 400 CAPSULE ORAL at 06:00

## 2020-01-01 RX ADMIN — Medication 2.5 MILLIGRAM(S): at 17:31

## 2020-01-01 RX ADMIN — GABAPENTIN 300 MILLIGRAM(S): 400 CAPSULE ORAL at 17:11

## 2020-01-01 RX ADMIN — Medication 100 MILLIGRAM(S): at 10:08

## 2020-01-01 RX ADMIN — Medication 650 MILLIGRAM(S): at 23:24

## 2020-01-01 RX ADMIN — Medication 2.5 MILLIGRAM(S): at 06:43

## 2020-01-01 RX ADMIN — GABAPENTIN 300 MILLIGRAM(S): 400 CAPSULE ORAL at 06:43

## 2020-01-01 RX ADMIN — PREGABALIN 1000 MICROGRAM(S): 225 CAPSULE ORAL at 12:24

## 2020-01-01 RX ADMIN — MORPHINE SULFATE 4 MILLIGRAM(S): 50 CAPSULE, EXTENDED RELEASE ORAL at 23:36

## 2020-01-01 RX ADMIN — Medication 2.5 MILLIGRAM(S): at 05:10

## 2020-01-01 RX ADMIN — Medication 2.5 MILLIGRAM(S): at 06:00

## 2020-01-16 NOTE — HISTORY OF PRESENT ILLNESS
[de-identified] : Ms. SERA JOVEL was diagnosed with endometrial cancer at age 65.\par  She was initially diagnosed with a grade 1 endometrial cancer in 2015 at [facility]. At that time she was seen by Dr. Givens at [facility].  She evidently was determined not to be a candidate for an abdominal surgery and a vaginal hysterectomy was recommended. She was taken to the OR for a VH, however, the procedure was aborted due to the "uterus being glued to the colon". She was then treated with whole pelvic RT 5040 cGY completed 10/15/15.  It does not appear that she received any brachytherapy. \par \par In June 2017 she again presented with heavy vaginal bleeding. A mass was noted to be protruding from the vagina and was biopsied showing G1 endometrioid adenocarcinoma. Screening for Encarnacion syndrome with MSI showed loss of expression of MLH1 and PMS2, however MLH promotor methylation was present suggesting a sporadic tumor. She was started on an aromatase inhibitor and referred to Dr. Pascale London for surgical consultation.  She underwent robotic laparoscopic hysterectomy and bilateral salpingo-oophorectomy on 10/12/2017.\par \par Vaginal apex recurrence s/p biopsy on 1/2/18 was positive for endometrial adenocarcinoma, c/w a recurrent endometrioid type adenocarcinoma, diffusely ER positive (almost 100%), and NM positive (>90%).\par \par Treatment summary:\par February 2018 - trial of megace + tamoxifen alternating - developed increased vaginal spotting with Tamoxifen - stopped both meds in March 2018. \par 5/11/18 - 9/7/18  6 cycles of carboplatin + taxol (q 3 week)\par Completed vaginal cuff mass SBRT on 11/14/18. \par 4/10/19 PET with persistent avidity at vaginal cuff and activity suspicious for mets to lymph nodes.   [de-identified] : Patient presents for follow-up.\par She is tolerating Doxil well. \par \par She has been having low grade fevers over the last 1 week. She has continued leakage of urine, unable to collect urine. She has to wear a pad during the day and night as urine is leaking.\par She has pelvic pressure when she stands. \par \par 01/8/2020 CT C/A/P: Status post hysterectomy. Poorly defined soft tissue superior to the vaginal cuff with central low attenuation and air which may represent necrotic tumor. Fistulization to the adjacent large bowel cannot be excluded. Clinical correlation is suggested. Bilateral hydronephrosis which has mildly progressed. A delayed nephrogram is now seen in the left kidney. Right adnexal soft tissue mass which is stable. Bladder is collapsed and very limited in evaluation. Small focus of air may be within the bladder and as mentioned previously, vaginal vesicular fistula cannot be excluded. Possible collapsed pharyngean cap in the gallbladder. Confirmation with right upper quadrant ultrasound is suggested.\par \par

## 2020-01-16 NOTE — ASSESSMENT
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  Developed local recurrence at vaginal apex which progressed through endocrine therapy. \par MRI pelvis 4/16/18 with increase in size of mass 6.2 cm, locally advanced.  Received 6 cycles of Carboplatin + Taxol completed on 9/7/18 with great response however MRI pelvis on 11/16/18 showed enlarging mass to which she received SBRT completed on 12/14/18.  4/10/19 PET with persistent avidity at vaginal cuff and activity suspicious for mets to lymph nodes. She began Keytruda on 4/2019, and switched to Doxil in 10/2019 for POD. 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  I reviewed the CT with radiologist as well, and plan is for MRI pelvis for further clarification of the fistulas. \par \par She also has low grade fever and persistent leakage of urine. Labs reviewed, with no leukocytosis, and anemia Hgb 10.6 which is stable. \par \par Plan: \par -follow up Dr. Roberts re:b/l hydronephrosis which appears worsened\par -MRI pelvis as above\par -given low grade fevers, will treat for UTI. Patient unable to collect urine.  Start Cipro 250 mg BID x 7 days. \par -Next Doxil cycle on the 1/21/20\par -Follow up in 1 month

## 2020-01-16 NOTE — ADDENDUM
[FreeTextEntry1] : I, Jaquelin Adames, acted solely as a scribe for Dr. Emperatriz Stanton on 01/14/2020.

## 2020-01-16 NOTE — PHYSICAL EXAM
[Ambulatory and capable of all self care but unable to carry out any work activities] : Status 2- Ambulatory and capable of all self care but unable to carry out any work activities. Up and about more than 50% of waking hours [Obese] : obese [Normal] : normal appearance, no rash, nodules, vesicles, ulcers, erythema [de-identified] : Appears fatigued, well groomed, in NAD. [de-identified] : clear [de-identified] : supple [de-identified] : S1S2  [de-identified] :  nondistended, soft, nontender on palpation. [de-identified] : no edema

## 2020-02-06 NOTE — ASU PATIENT PROFILE, ADULT - LEARNING ASSESSMENT (PATIENT) ADDITIONAL COMMENTS
Telephone interview with pt's daughter, Leonarda, instructed on pre-op instructions/teaching, tips for safer surgery, pain management scale. Verbalized understanding of all instructions given.

## 2020-02-06 NOTE — ASU PATIENT PROFILE, ADULT - ANESTHESIA, PREVIOUS REACTION, PROFILE
SUBJECTIVE:                                                    James Hernandez is a 50 year old male who presents to clinic today for the following health issues:    Musculoskeletal problem/pain      Duration: 2 weeks    Description  Location: left arm    Intensity:  severe, 7/10 currently, 10/10 at worst    Accompanying signs and symptoms: radiation of pain to upper and lower left arm and numbness in 4th and 5th finger on left hand    History  Previous similar problem: no   Previous evaluation:  none    Precipitating or alleviating factors:  Trauma or overuse: YES-possible trauma, was moving a box when pain worsened  Aggravating factors include: movement on left arm. lifting    Therapies tried and outcome: heat, ice, massage and Ibuprofen    2 weeks ago just noticed a pain on the inside of his elbow  everytime he would lift something would have worsening pain  Has had it before and went away in the past  This morning he went to push a box and then had sharp pain persistent since then    BP elevated today but asymptomatic. No headache no blurring of vision no chest pain no shortness of breath no dizziness no unsteadiness no numbness no weakness    PAST MEDICAL HISTORY: hypertension    No neck pain   No loss of control of bowel or bladder, no numbness or weakness down the arms or legs aside from noted above    Problem list and histories reviewed & adjusted, as indicated.  Additional history: as documented    Problem list, Medication list, Allergies, and Medical/Social/Surgical histories reviewed in EPIC and updated as appropriate.    ROS:  Constitutional, HEENT, cardiovascular, pulmonary, gi and gu systems are negative, except as otherwise noted.    OBJECTIVE:                                                    /83  Pulse 67  Temp 97.6  F (36.4  C) (Oral)  Resp 14  Wt 211 lb 3.2 oz (95.8 kg)  SpO2 98%  There is no height or weight on file to calculate BMI.  Awake alert not in any acute cardiorespiratory  distress  Psych: pleasant   Neurologic: No gross neurologic deficits  Skin: no obvious lesions or rashes  Musculoskeletal:  Affected extremity neurovascularly intact, no cyanosis or edema, good pulses and capillary refill.   Tender left medial epicondyle at the elbow reproducible of pain with increased pain with resisted pronation and supination of the forearm.  Positive tinnel's sign causing tingling on left 4th and 5th digit. tinnel at elbow ulnar groove.     Diagnostic Test Results:  none      ASSESSMENT/PLAN:                                                        ICD-10-CM    1. Medial epicondylitis of elbow, left M77.02 ORTHO  REFERRAL     gabapentin (NEURONTIN) 300 MG capsule   2. Ulnar neuropathy at elbow of left upper extremity G56.22 ORTHO  REFERRAL     gabapentin (NEURONTIN) 300 MG capsule   3. Benign essential hypertension I10        Patient instructions from [facility] and todate given with supportive treatment  And exercises recommended  Over the counter meds  Denies any history of ulcers or kidney disease or any known contraindication to NSAIDs  Prescribed with gabapentin. Warned sedating  Sedating medications given. Aware not to drive or operate machinery while on these medications. Caution with .   Referred to orthopedic for follow up of medial epicondylitis and ulnar neuropathy  Elbow brace given in urgent care as well.   Alarm signs or symptoms discussed, if present recommend go to ER   Adverse reactions of medications discussed.  Over the counter medications discussed.   Aware to come back in if with worsening symptoms or if no relief despite treatment plan  Patient voiced understanding and had no further questions.     Your blood pressure reading was elevated today.  Recommend that you have it re-checked either at home or at our clinic within a week  If your blood pressure top number (systolic) 180 and above, or the bottom number (diastolic) 120 and above, you need to be  seen immediately.  If persistently elevated top number 140 and above, or the bottom number 90 and above, please schedule an appointment to see a provider in clinic within a week.  If you have very elevated blood pressures, accompanied by headache, chest pain, numbness, weakness, slurring of speech, confusion, difficulty walking, call 911 and go to the ER      MD Benita Jeong MD  [facility]     none

## 2020-02-12 NOTE — CHART NOTE - NSCHARTNOTEFT_GEN_A_CORE
Vascular & Interventional Radiology Pre-Procedure Note    Procedure Name: ___Bilateral nephrostomy insertion____    HPI: 69y Female with ____bilateral hydronephrosis___    Allergies:   Medications (Abx/Cardiac/Anticoagulation/Blood Products)      Data:  157.48  100.7  T(C): 37.7  HR: 103  BP: 142/82  RR: 16  SpO2: 98%    Exam  General: ___wnl____  Chest: ___wnl____  Abdomen: ___wnl____  Extremities: ____wnl___      plts: 283  -INR1.32    Imaging: ___bilateral hydronephrosis____    Plan:   -69y Female presents for ____bilateral nephrostomy___  -Risks/Benefits/alternatives explained with the patient via a  and witnessed informed consent obtained.

## 2020-02-12 NOTE — ASU DISCHARGE PLAN (ADULT/PEDIATRIC) - PAIN MANAGEMENT
Prescription given to patient/guardian/Take over the counter pain medication Prescriptions electronically submitted to pharmacy from doctor's office

## 2020-02-12 NOTE — ASU DISCHARGE PLAN (ADULT/PEDIATRIC) - DRIVING
Yes/beginning 2/13/2020 Breathing spontaneous and unlabored. Breath sounds clear and equal bilaterally with regular rhythm.

## 2020-02-12 NOTE — ASU DISCHARGE PLAN (ADULT/PEDIATRIC) - MEDICATION INSTRUCTIONS
take tylenol as needed for mild pain, percocet (1-2 tablets) as needed for more severe pain Percocet prescription sent to [facility] in [place].  Take tylenol for mild pain, Percocet more more severe pain

## 2020-02-12 NOTE — ASU DISCHARGE PLAN (ADULT/PEDIATRIC) - CALL YOUR DOCTOR IF YOU HAVE ANY OF THE FOLLOWING:
Fever greater than (need to indicate Fahrenheit or Celsius)/Wound/Surgical Site with redness, or foul smelling discharge or pus/Bleeding that does not stop/Swelling that gets worse/Pain not relieved by Medications

## 2020-02-12 NOTE — ASU DISCHARGE PLAN (ADULT/PEDIATRIC) - ACTIVITY LEVEL
No excercise/No heavy lifting/No sports/gym No weight bearing/No excercise/No heavy lifting/No sports/gym

## 2020-02-20 NOTE — ASSESSMENT
[FreeTextEntry1] : B/l nephrostomy tubes\par \par Pt has home care nurse daily. \par Nephrostomy tube change every 3 months IR, weekly bag change, daily flush \par RTO PRN

## 2020-02-20 NOTE — LETTER BODY
[Dear  ___] : Dear  [unfilled], [Sincerely,] : Sincerely, [Courtesy Letter:] : I had the pleasure of seeing your patient, [unfilled], in my office today. [Please see my note below.] : Please see my note below. [FreeTextEntry3] : Ed\par \par Shawn Roberts MD\par [facility]\par  of Urology\par [employer] [facility]\par

## 2020-02-20 NOTE — HISTORY OF PRESENT ILLNESS
[FreeTextEntry1] : With endometrial cancer diagnosed 2015, recent visit to Oncology will have change in chemo and to receive blood transfusion tomorrow.  With b/l nephrostomy tubes, right draining more volume than left, scant urine from urethra. Denies fever, chills. Accompanied by daughter.

## 2020-02-20 NOTE — RESULTS/DATA
[FreeTextEntry1] : 1/22/20 MRI Abdomen Impression: Evidence of local recurrence within the vaginal apex and LEFT vaginal wall.  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  Significant fluid within the vaginal cavity with bilateral hydroureter. The  LEFT ureter appears to communicate with the LEFT vaginal fornix. Correlate  with physical signs/symptoms of ureteral vaginal fistula.  RIGHT adnexal mass consistent with recurrence.

## 2020-02-20 NOTE — PHYSICAL EXAM
[Obese] : obese [Capable of only limited self care, confined to bed or chair more than 50% of waking hours] : Status 3- Capable of only limited self care, confined to bed or chair more than 50% of waking hours [Normal] : pharynx is unremarkable, moist mucus membrane, no oral lesions [de-identified] : In wheel chair, well groomed, in NAD. [de-identified] : clear [de-identified] : negative cervical, supra/infraclavicular adenopathy. [de-identified] : S1S2 sl tachy rate [de-identified] :  BS+, soft, nontender on palpation.  [de-identified] : no edema [de-identified] : bilateral nephrostomy tubes. [de-identified] : without spinal or cva tenderness. [de-identified] : Generalized weakness. Observed her raise self to standing position, required assist stepping up on to scale. [de-identified] : pale

## 2020-02-20 NOTE — HISTORY OF PRESENT ILLNESS
[de-identified] : Bilateral nephrostomy tubes L side draining clear, R side w/ intermittent blood. Home health nurses are coming out to see her.  They did not have supplies so, only one tube was flushed one tube. R nephrostomy tube dsg w/ scant amt. of sanguinous drainage, and was changed by home RN. Denies fevers, no SOB, no CP, appetite is fair, + constipation, LBM this AM, reports Lactulose, is not helpful and she does not like the taste. She has been using magnesium citrate, which usually results in "too much" of a movement. No pain/burning w/ urination. No vaginal bleeding. No LE edema. Energy level is fair to poor. She is having more pelvic discomfort. The remainder of ROS is negative.\par  [de-identified] : Ms. SERA JOVEL was diagnosed with endometrial cancer at age 65.\par  She was initially diagnosed with a grade 1 endometrial cancer in 2015 at [facility]. At that time she was seen by Dr. Givens at [place].  She evidently was determined not to be a candidate for an abdominal surgery and a vaginal hysterectomy was recommended. She was taken to the OR for a VH, however, the procedure was aborted due to the "uterus being glued to the colon". She was then treated with whole pelvic RT 5040 cGY completed 10/15/15.  It does not appear that she received any brachytherapy. \par \par In June 2017 she again presented with heavy vaginal bleeding. A mass was noted to be protruding from the vagina and was biopsied showing G1 endometrioid adenocarcinoma. Screening for Encarnacion syndrome with MSI showed loss of expression of MLH1 and PMS2, however MLH promotor methylation was present suggesting a sporadic tumor. She was started on an aromatase inhibitor and referred to Dr. Pascale London for surgical consultation.  She underwent robotic laparoscopic hysterectomy and bilateral salpingo-oophorectomy on 10/12/2017.\par \par Vaginal apex recurrence s/p biopsy on 1/2/18 was positive for endometrial adenocarcinoma, c/w a recurrent endometrioid type adenocarcinoma, diffusely ER positive (almost 100%), and SC positive (>90%).\par \par Treatment summary:\par 02/2018 - 3/2018 trial of Megace + tamoxifen alternating - developed increased vaginal spotting with Tamoxifen - stopped both meds. \par 05/11/18 - 9/7/18  6 cycles of carboplatin + taxol (q 3 week).\par 11/14/18 - completed vaginal cuff mass SBRT. \par 04/10/19 - PET with persistent avidity at vaginal cuff and activity suspicious for mets to lymph nodes.  \par 04/26/19 - 9/2019 Pembrolizumab (Keytruda)\par 10/28/19 - 1/21/20 Doxorubicin Liposomal

## 2020-02-20 NOTE — PHYSICAL EXAM
[General Appearance - Well Developed] : well developed [Normal Appearance] : normal appearance [Well Groomed] : well groomed [General Appearance - Well Nourished] : well nourished [Skin Color & Pigmentation] : normal skin color and pigmentation [FreeTextEntry1] : B/l nephrostomy tubes, clear yellow urine left, right light pink tinge. Nephrostomy site anand D/I. [General Appearance - In No Acute Distress] : no acute distress [Exaggerated Use Of Accessory Muscles For Inspiration] : no accessory muscle use [Respiration, Rhythm And Depth] : normal respiratory rhythm and effort [] : no respiratory distress [Oriented To Time, Place, And Person] : oriented to person, place, and time [Not Anxious] : not anxious [Mood] : the mood was normal [Affect] : the affect was normal

## 2020-02-20 NOTE — ASSESSMENT
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  Developed local recurrence at vaginal apex which progressed through endocrine therapy. \par MRI pelvis 4/16/18 with increase in size of mass 6.2 cm, locally advanced.  Received 6 cycles of Carboplatin + Taxol completed on 9/7/18 with great response however MRI pelvis on 11/16/18 showed enlarging mass to which she received SBRT completed on 12/14/18.  4/10/19 PET with persistent avidity at vaginal cuff and activity suspicious for mets to lymph nodes. She began Keytruda on 4/2019, and switched to Doxil in 10/2019 for POD. 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 MRI pelvis was done 1/22/20 which shows evidence of local recurrence within the vaginal apex and LEFT vaginal wall. Soft tissue mass between the vaginal apex and inferior surface of the sigmoid colon likely represents area of local recurrence with probable colovaginal fistula formation. \par RIGHT adnexal mass consistent with recurrence. On 2/12/20 bilateral nephrostomy tubes placed in IR. \par \par Home health RN's are managing the nephrostomy catheters. Her daughter mentions that the R neph tube had some sanguinous drainage initially which has cleared. The L side is clear. She is afebrile, WBC 8.7, ANC 6.0, Hgb 8.0 g/dL, down from 9.9 on 1/21/20. Plt 359K. Dr. Stanton came in to discuss next steps, and for recommendation for Temsirolimus, she is not an Avastin candidate w/ fistula formation. \par \par Discussed the risks and benefits including but not limited to: rash, asthenia, mucositis, nausea, edema, and anorexia, anemia, hyperglycemia, elevated alkaline phosphatase, elevated serum creatinine, lymphopenia,  thrombocytopenia, elevated AST, and leukopenia. Her questions were answered and she signed consent.\par \par Plan: \par - Transfuse w/ 1 UPRBCs on 2/21 at [facility]. \par - F/u scheduled w/ Dr. Roberts for nephrostomy tube management on 2/20/20.\par - Discontinue Doxil and schedule for Temsirolimus weekly.\par - Pain: daughter states Tylenol not effective, avoiding NSAIDS. She has oxycodone 5 mg at home, which makes her sleepy. Will try 1/2 tab - 1 tab.\par - Constipation: Mirilax daily, avoid Mag+ Citate on a regular basis- can contribute to fluid and electrolyt loss. Give Dulcolax a try if no BM in 2-3 days. Drink fluids and get up as much as possible.\par - Follow up week 2 Temsirolimus

## 2020-02-27 PROBLEM — G89.3 CANCER RELATED PAIN: Status: ACTIVE | Noted: 2020-01-01

## 2020-03-05 PROBLEM — K59.00 CONSTIPATION: Status: ACTIVE | Noted: 2019-01-01

## 2020-03-05 NOTE — PHYSICAL EXAM
[Capable of only limited self care, confined to bed or chair more than 50% of waking hours] : Status 3- Capable of only limited self care, confined to bed or chair more than 50% of waking hours [Obese] : obese [Normal] : affect appropriate [de-identified] : In wheel chair, well groomed, in NAD. [de-identified] : supple [de-identified] : clear [de-identified] : S1S2 sl tachy rate [de-identified] : no edema [de-identified] : bilateral nephrostomy tubes. [de-identified] : without spinal or cva tenderness.

## 2020-03-05 NOTE — ADDENDUM
[FreeTextEntry1] : I, Jaquelin Adames, acted solely as a scribe for Dr. Emperatriz Stanton on 03/5/2020.

## 2020-03-05 NOTE — HISTORY OF PRESENT ILLNESS
[de-identified] : Ms. SERA JOVEL was diagnosed with endometrial cancer at age 65.\par  She was initially diagnosed with a grade 1 endometrial cancer in 2015 at [facility]. At that time she was seen by Dr. Givens at [place].  She evidently was determined not to be a candidate for an abdominal surgery and a vaginal hysterectomy was recommended. She was taken to the OR for a VH, however, the procedure was aborted due to the "uterus being glued to the colon". She was then treated with whole pelvic RT 5040 cGY completed 10/15/15.  It does not appear that she received any brachytherapy. \par \par In June 2017 she again presented with heavy vaginal bleeding. A mass was noted to be protruding from the vagina and was biopsied showing G1 endometrioid adenocarcinoma. Screening for Encarnacion syndrome with MSI showed loss of expression of MLH1 and PMS2, however MLH promotor methylation was present suggesting a sporadic tumor. She was started on an aromatase inhibitor and referred to Dr. Pascale London for surgical consultation.  She underwent robotic laparoscopic hysterectomy and bilateral salpingo-oophorectomy on 10/12/2017.\par \par Vaginal apex recurrence s/p biopsy on 1/2/18 was positive for endometrial adenocarcinoma, c/w a recurrent endometrioid type adenocarcinoma, diffusely ER positive (almost 100%), and IN positive (>90%).\par \par Treatment summary:\par 02/2018 - 3/2018 trial of Megace + tamoxifen alternating - developed increased vaginal spotting with Tamoxifen - stopped both meds. \par 05/11/18 - 9/7/18  6 cycles of carboplatin + taxol (q 3 week).\par 11/14/18 - completed vaginal cuff mass SBRT. \par 04/10/19 - PET with persistent avidity at vaginal cuff and activity suspicious for mets to lymph nodes.  \par 04/26/19 - 9/2019 Pembrolizumab (Keytruda)\par 10/28/19 - 1/21/20 Doxorubicin Liposomal [de-identified] : Pt returns for f/u. S/p C1 of Temsirolimus. \par On 2/29 developed fever to 104F.  There was a kink in the nephrostomy tube which was noticed by family.  She was put on 500 mg of Levofloxacin and today is day 5 of 10 treatment. \par C/o intermittent pain in her lower abdomen. She has been laying down more. She does not eating as well as before. \par Reports severe constipation, now s/p Dulcolax and Mirilax with BM this morning.\par Daughter states that patient stopped Miralax.

## 2020-03-05 NOTE — ASSESSMENT
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  Developed local recurrence at vaginal apex which progressed through endocrine therapy. \par MRI pelvis 4/16/18 with increase in size of mass 6.2 cm, locally advanced.  Received 6 cycles of Carboplatin + Taxol completed on 9/7/18 with great response however MRI pelvis on 11/16/18 showed enlarging mass to which she received SBRT completed on 12/14/18.  4/10/19 PET with persistent avidity at vaginal cuff and activity suspicious for mets to lymph nodes. She began Keytruda on 4/2019, and switched to Doxil in 10/2019 for POD, then switched to Temsirolimus in 2/2020 for POD with worsening hydronephrosis / obstructive uropathy, s/p bilateral neph tube placement. \par \par Will defer week 2 temsirolimus due to being on Abx for UTI.\par CBC shows anemia, Hgb 9.3 g/dL. \par \par Plan: \par - Complete Levofloxacin course x 5 more days for UTI\par - Hold Temsirolimus today and reschedule to next week \par -Anemia due to chemotherapy - Hgb 9, continue to monitor\par -Constipation - take miralax half capful every day.

## 2020-03-09 NOTE — HISTORY OF PRESENT ILLNESS
[facility] [address]    [address]    [address]    [place] [facility] [address]    Phone:  [phone]       Thank You for choosing us for your health care visit. We are glad to serve you and happy to provide you with this summary of your visit. Please help us to ensure we have accurate records. If you find anything that needs to be changed, please let our staff know as soon as possible.          Your Demographic Information     Patient Name Sex     Lynn Helm Female 1976       Ethnic Group Patient Race    Unknown Unknown      Your Visit Details     Date & Time Provider Department    2017 3:45 PM Kana Gaines MD [facility] [address]      Your Upcoming Appointment*(Max 10)     2017 11:30 AM CDT   US PELVIS COMPLETE with  US  1   [facility] [address] ([facility] [address])    [address]  [address]  [facility] [address]   [phone]            2017 12:30 PM CDT   Office Visit with Kana Gaines MD   [facility] [address] ([facility] [address])    [address]  [address]  [facility] [address]   [phone]              Your To Do List     Future Orders Please Complete On or Around Expires    CBC & AUTO DIFFERENTIAL      THYROID STIMULATING HORMONE      US PELVIS COMPLETE NON OB  2017 Sep 03, 2017    MAMMO SCREENING W CHARLES BILATERAL  2017 (Approximate) 2018    Follow-Up    Return in 1 year (on 2018) for 1 year annual exam.      We Ordered or Performed the Following     THINPREP PAP TEST WITH HPV REGARDLESS       Conditions Discussed Today or Order-Related Diagnoses        Comments    Encounter for gynecological examination with abnormal finding    -  Primary     pap ( co-test ) 10/20/2014          Excessive or frequent menstruation - see comment          Irregular menses         Weight loss          dense breast tissue without focal suspect change :  3-D/tomosynthesis would be appropriate for screening of breasts of this density - please schedule appropriately          Breast cancer screening         Adnexal mass - right ; soft, cystic , 3 cm            Your Vitals Were     BP Height Weight LMP BMI Smoking Status    102/60 5' 1\" (1.549 m) 112 lb 14 oz (51.2 kg) 05/29/2017 21.33 kg/m2 Never Smoker      Medications Prescribed or Re-Ordered Today     None      Allergies     Sulfa Antibiotics RASH      Immunizations History as of 6/5/2017     Name Date    INFLUENZA QUADRIVALENT 11/12/2014    Influenza 9/10/2009      Problem List as of 6/5/2017     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 Most Important: Study well (education improves chances of employment and personal independence), no pregnancy (unless desired), addiction to alcohol, tobacco (eg: smoking), drugs (legal or illegal) or sexually transmitted diseases.    2. Current  ACOG (American College of OB / GYN) guidelines:  • pap smear as screen for cervical dysplasia / cancer - starts at 21; initially cytology only  • annual exam - (pelvic and  breast) does not need to be done based on age; especially not before initiation of intercourse     3. Contraception: long acting devices (intrauterine device vs skin implants) vs birth control pills vs other methods; also important to remember (for unplanned events) - morning after hormonal contraception (eg Plan B) which may be used up to 5 days after  [FreeTextEntry1] : Patient has been having treatment for UTI. no urgency. no feeling abdominal pain.  Tubes are draining well. some flank pain on left only but the left neph tube draining well. no urgency. some voiding per urethral.\par \par Recent creat is normal.  intercourse.    4. Nutrition and Prevention of Osteopenia - Osteoporosis:  • A healthy lifestyle and good nutrition are helpful in counteracting the most important factor in deteriorating bone quality: advancing age.  Weight bearing exercise (walking, weight lifting) is important and specific physical therapy may be included.  • Calcium is needed for a bone metabolism; dietary sources are best (see separate sheet); if supplement is used and exceeds 500 mg --> take in divided doses to allow absorption).  • Vitamin D3 is needed for calcium absorption.  Although not routinely recommended by the ACOG, a blood level of vitamin D may be checked ( normal is ); if low - a much higher dose would be required; please check with insurance regarding coverage and let me know if testing is desired). Unless the level is deficient the upper limit of intake is 4,000 units, since both low and high levels appear to carry risks.    o 2012 [place] of Medicine recommendations for dietary allowances are:  Age (years) Calcium (mg/day) Vitamin D3 (Units/day)   9-18 1,300 600   19-50 1,000 600     5. Be aware of concerns regarding   • depression   • peer pressure; including partner or sexual abuse (may involve alcohol and drugs)     6. \"Safe sex\":    • There are 65 million people in the [place] with incurable sexually transmitted diseases ([facility] 2012)   • Thus: abstinence vs testing both partners for sexually transmitted diseases before initiation of coitus, using condoms with spermicide and retesting in 6 months; and if all tests are negative and couple continues a monogamous and trusting relationship, only at that point considering intercourse without condoms.    7. Gardasil ( or released at the end of 2014 : 9-valent Gardasil-9)  vaccination should  be considered as a protection against future complications of possible HPV (Human Papilloma Virus) - which is the most common sexually transmitted disease (79 millions American   currently infected).         It is currently recommended and approved for both girls ( young women)  and   boys ( young men)   2014 ACOG (American College of OB / GYN) update:  • The vaccine is 70% effective in reducing the risk of cervical cancer, and 50-85% of cancers of the vagina, vulva, penis, anus and mouth.  • The target age is 11 to 12 years (before sexual activity begins for most girls and boys), but may be given (as a catch-up) until age 26 (even if sexual activity has begun)  • The vaccine is NOT linked to more or earlier sexual activity   • Millions of people have received the HPV vaccine without serious side effects   • It is given as a series of 3 injections, which are all needed for full protection   o 2017 update: if given before 15 years of age : 2 doses are sufficient ( 2nd given 6-12 months after 1st; but if given sooner then 5 months - a 3rd dose is recommended )   • Record of vaccination may be found in the [facility] and/or WIR ([place] Immunization Registry)       ---------------    for you    Current  (updated November 2016) recommendations of ACOG ( American College of OB/GYN) regarding screening for cervical cancer / dysplasia in asymptomatic patients include   :   1.  annual exam ( including a clinical pelvic exam) is recommended every 1 year   2.  specific age related frequency and test type vary:   submitted today : pap smear (co-test = cytology and screen for high risk HPV)     if both normal / negative : next     either reflex pap smear (liquid cytology) every 3 years     or ( after age 30) co-test ( cytology and screen for high risk HPV)     every 5 years     It is important to remember that above guidelines apply to patients who are asymptomatic and whose lower tract ( including the cervix) are clinically normal.    ( For patients younger than 26 -  compete Gardasil vaccination if not done so far)     ----------------    Screening in general   /  or as related to  family history of  breast cancer.      Standard screening includes (with some recent debate):   ·   Breast self-awareness (for high risk patients : monthly self breast exam) ,   · recent issues include :  · anxiety / depression - overall handled well by American women  · statistically : no difference in death rate from breast cancer   · benefit from early detection  ·   yearly physician exam and    ·   yearly mammogram ( starting at 40 or as indicated by clinical findings ( meaning : a          diagnostic mammogram should be done if abnormality detected by yourself,                 /  partner or physician) ; digital is better then analog)   Sensitivities in detecting breast cancer:    mammogram  ~ 90%     monthly self breast exam ~ 60%    physician exam ~ 40-50%    please schedule a  mammogram soon    [facility]    1. Number to schedule mammogram : [phone]  2. dense breast tissue:  3-D/tomosynthesis would be appropriate for screening of breasts of this density - please schedule appropriately        If desired or based on family history,  additional interventions include  the followin. genetic consult (if risk > 20%--> MRI \"should be covered by insurance\")  2. MRI of breasts (higher sensitivity but more false + results (5-6 x's) , leading to more biopsies)  3. BRCA 1 and 2 gene testing ( 2015 update: newer tests are available : example -  [employer]s Hereditary Breast and Gynecologic Cancers Screen ( which includes BRCA and additional screening tests  at a lesser price ( $ ~ 1,500)    (The 1st question is :\" do you wish to know the result\" because  if +: 60% risk breast cancer ; 30-40% ovarian cancer) ( this mutation is responsible for 10 % of breast cancers; conversely - 90% of patients with breast cancer are negative for the gene)   4. Medical suppression:             a. Tamoxifen (SE-endometrial hyperplasia)             b. Evista - would also address osteoporosis /  osteopenia   (SE-thrombosis)    5.Surgical options  6. Close follow-up with an experienced breast specialist    expense of testing is an issue;    some tests will be covered by insurance    however - insurance coverage does not determine the appropriateness of     medical decisions ; ie : some patients who wish to use screening tests    will chose to pay for them if not covered by insurance     2013 ACOG recommendations for high risk patients:  ·  Breast self-awareness (for high risk patients : monthly self breast exam)    ·  physician exam every 6 months with breast consultant / specialist   ·  mammogram every 1 year   ·  MRI breasts every 1 year ( alternates with mammogram every 6 months)     2015 Update   American Cancer Society guidlines were changed ; current ACOG (American College of OB / GYN) guidelines  are being reviewed; please keep in mind : these are guidelines; medical care remains personalized.      ------------------    Screening in general   /  or as related to  FHx of colon cancer:     Colonoscopy : screening at age 50, or 10 years younger than family member with diagnosis of colon cancer (or sooner if other members with colon polyps), follow-up colonoscopy as recommended by gastroenterologist based on findings;     yearly screen for occult gastrointestinal bleeding:  ·      is recommended on an annual basis by [facility] and  ACOG                                     (American College of OB / GYN)   ·      some gastroenterologists do not recommend the test in patients with negative     and current colonoscopic screening   ·      screening starts at 50 or as indicated by symptoms or family history      warning signs to be reported include : melena (black stools), unexplained rectal bleeding ( ie - not related to to hemorrhoidal irritation ) , change in bowel habits or caliber of stools, or pain; as well as  unexplained laboratory changes such as anemia).     --------------    heavy menstrual / uterine  bleeding ( previously known as menorrhagia)    right adnexal cyst  dyspareunia  30-40 %      ?? adenomyosis ; endometrioma     check :    complete blood count    TSH ( thyroid stimulating hormone - reflecting thyroid function)    pelvic ultrasound  + see me for disposition     Therapeutic choices discussed with patient :  4. simple observation  5. medical interventions (side effects reviewed):  · -analgesics (ibuprofen 200-800 mg every 6 hour / hours ; naproxen 220 mg every 12 hour / hours;   · narcotics : long term concern: addiction)  · using nonsteroidal pain medications will decrease the need for narcotic analgesics   · -birth control pills / rings (Nuvaring)  · -progesterone; continuous (oral or depo-provera), cyclic  · -mirena  · -lupron  6. surgical interventions:   · Myomectomy / ies  · Hysteroscopic endomyometrial resection and ablation with a resectoscope with or without laparoscopic biopsies  · 80% effective for bleeding  · 50 % effective for pain  risk of intrauterine adhesions resulting in the possibility of  masking early symptoms of uterine malignancy later in life explained    · Hysterectomy  (long-term concerns: pelvic relaxation ; urinary stress incontinence, vaginal dryness / prolapse) depending on age at time of procedure with or without bilateral salpino-oophorectomy                                                 4.   radiologic interventions:  · -embolization  · -MRI/CT focused ultrasound ablation    -------------    Prevention of Osteopenia - Osteoporosis:    1. A healthy lifestyle and good nutrition are helpful in counteracting the most important factor in deteriorating bone quality: advancing age.  Aerobic, resistance and weight bearing exercise (the best type has not been determined yet but walking encompasses all 3) is important..  At times  specific physical therapy may be included.    2. Calcium is needed for a bone metabolism; dietary sources are best (see separate sheet); if  supplement is used and exceeds 500 mg --> take in divided doses to allow absorption).     3. Vitamin D3 is needed for calcium absorption.  Although not routinely recommended by the ACOG, a blood level of vitamin D may be checked ( normal is ); if low - a much higher dose would be required; please check with insurance regarding coverage and let me know if testing is desired). Unless the level is deficient the upper limit of intake is 4,000 units, since both low and high levels appear to carry risks.          2014 - the range of \" normal\" levels and routine screening is debated by experts.    2012 [place] of Medicine recommendations for dietary allowances are  (please note - these amounts are for  patients with normal levels of vitamin D; if abnormal - recommended amount would be different) :    Age (years) Calcium (mg/day) Vitamin D3 (Units/day)   9-18 1,300 600   19-50 1,000 600   51-70 1,200 600   71 and older 1,200 800     4. Bone densitometry (DEXA) should be considered as a screening tool. All major guidelines recommended to start at age 65 or sooner if patient is postmenopausal and has other risk factors for fracture.   If not covered by insurance; seek \"free\" or discounted screens at health fairs.  The interval of follow-up testing varies from 2 to 5 to 15 years depending on results and risk for fracture.    5. Medical treatment should be considered in  • Women with a T score < -2.5  • Women with low-trauma fracture  • Women with a T score from -1.0 to -2.5  • FRAX score > or = 3% for risk of hip fracture (in 10 years)  • FRAX score >or = 20 for risk of major osteoporotic fracture (10 years)    If bisphosphonates are used (eg. Fosamax); consideration should be given to a drug-holiday after 5-6 years due to recent reports of atypical bone fractures.    6.  Helpful sites:   Hormone.org   Womentowomen.com

## 2020-03-09 NOTE — ASSESSMENT
[FreeTextEntry1] : Impression:\par \par ureteral obstruction due to endometrial cancer\par \par Plan:\par follow up with me PRN\par Needs every three month change of neph tubes.\par Will have her see me when needs.  Heme/Onc to write neph tube change orders.\par

## 2020-03-09 NOTE — PHYSICAL EXAM
[General Appearance - Well Developed] : well developed [General Appearance - Well Nourished] : well nourished [Normal Appearance] : normal appearance [Well Groomed] : well groomed [General Appearance - In No Acute Distress] : no acute distress [Skin Color & Pigmentation] : normal skin color and pigmentation [] : no respiratory distress [Respiration, Rhythm And Depth] : normal respiratory rhythm and effort [Exaggerated Use Of Accessory Muscles For Inspiration] : no accessory muscle use [Oriented To Time, Place, And Person] : oriented to person, place, and time [Affect] : the affect was normal [Mood] : the mood was normal [Not Anxious] : not anxious [FreeTextEntry1] : shuffling gate. [No Focal Deficits] : no focal deficits

## 2020-03-19 PROBLEM — F32.9 DEPRESSION, UNSPECIFIED DEPRESSION TYPE: Status: ACTIVE | Noted: 2020-01-01

## 2020-03-19 PROBLEM — R63.0 POOR APPETITE: Status: ACTIVE | Noted: 2020-01-01

## 2020-03-19 NOTE — ASSESSMENT
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  Developed local recurrence at vaginal apex which progressed through endocrine therapy. \par MRI pelvis 4/16/18 with increase in size of mass 6.2 cm, locally advanced.  Received 6 cycles of Carboplatin + Taxol completed on 9/7/18 with great response however MRI pelvis on 11/16/18 showed enlarging mass to which she received SBRT completed on 12/14/18.  4/10/19 PET with persistent avidity at vaginal cuff and activity suspicious for mets to lymph nodes. She began Keytruda on 4/2019, and switched to Doxil in 10/2019 for POD, then switched to Temsirolimus 2/27/20 for POD with worsening hydronephrosis / obstructive uropathy, s/p bilateral neph tube placement. \par \par S/p 2 doses of temsirolimus, last on 3/12/20. Low grade fever - 100.7 yesterday. WBC 7.8, ANC 5.1. No longer symptomatic of UTI. ++ Fatigue hgb 8.4 g/dL, down from 9.3 on 2/05. Eating/drinking poorly. HR tachycardic @ 125 bpm. Wgt loss of 5 kg since 2/18/20. She does note decreased pelvic pressure which may be r/t treated UTI vs response. Discussed  w/ Dr. Stanton, who joined visit. Recommendations below.\par \par Plan: \par - Hold Temsirolimus today for anemia. In light of Covid -19 virus concerns, change treatment plan to single agent Carbo w/ OnPro every 3 weeks. Begin next week. Risks/benefits discussed including but not limited to: hypersensitivity reaction, n/v, anemia, neutropenia, thrombocytopenia, and renal impairment. Consent signed.\par - Tachycardia: hydrate 1 L NS now.\par - Anemia due to chemotherapy: symptomatic w/ ++ fatigue. Iron panel, B12 and folate labs ordered. T&C match today for 2 UPRBCs in AM. \par - Poor appetite/wgt loss: Mirtazapine 7.5 mg X 1week at HS, if tolerated, increase to two tabs (15 mg), Next rx change to 15 mgs.\par - Constipation - cont. MiraLAX half capful every day. \par - RTO w/ Dr. Stanton on C2 Carbo/OnPro.

## 2020-03-19 NOTE — HISTORY OF PRESENT ILLNESS
[de-identified] : Ms. SERA JOVEL was diagnosed with endometrial cancer at age 65.\par  She was initially diagnosed with a grade 1 endometrial cancer in 2015 at [facility]. At that time she was seen by Dr. Givens at [place].  She evidently was determined not to be a candidate for an abdominal surgery and a vaginal hysterectomy was recommended. She was taken to the OR for a VH, however, the procedure was aborted due to the "uterus being glued to the colon". She was then treated with whole pelvic RT 5040 cGY completed 10/15/15.  It does not appear that she received any brachytherapy. \par \par In June 2017 she again presented with heavy vaginal bleeding. A mass was noted to be protruding from the vagina and was biopsied showing G1 endometrioid adenocarcinoma. Screening for Encarnacion syndrome with MSI showed loss of expression of MLH1 and PMS2, however MLH promotor methylation was present suggesting a sporadic tumor. She was started on an aromatase inhibitor and referred to Dr. Pascale London for surgical consultation.  She underwent robotic laparoscopic hysterectomy and bilateral salpingo-oophorectomy on 10/12/2017.\par \par Vaginal apex recurrence s/p biopsy on 1/2/18 was positive for endometrial adenocarcinoma, c/w a recurrent endometrioid type adenocarcinoma, diffusely ER positive (almost 100%), and VA positive (>90%).\par \par Treatment summary:\par 02/2018 - 3/2018 trial of Megace + tamoxifen alternating - developed increased vaginal spotting with Tamoxifen - stopped both meds. \par 05/11/18 - 9/7/18  6 cycles of carboplatin + taxol (q 3 week).\par 11/14/18 - completed vaginal cuff mass SBRT. \par 04/10/19 - PET with persistent avidity at vaginal cuff and activity suspicious for mets to lymph nodes.  \par 04/26/19 - 9/2019 Pembrolizumab (Keytruda)\par 10/28/19 - 1/21/20 Doxorubicin Liposomal [de-identified] : Started temsirolimus on 2/27, week 2, March 5th held for UTI. Completed antibx ~2 weeks ago. Her family randomly takes her temperature every few hours. For the past few days  T.max of 100.7 last night, no chills. No contact w/ anyone one dx'd w/ Covid-19 virus. No international travel in 14 days or contact w/ anyone who did. No SOB, no CP, appetite is poor for ~ 1 month. Manageable constipation w/ Mirilax, LBM yesterday. No diarrhea. No longer pain/burning w/ urination. No vaginal discharge. Also reports no longer has feeling of pelvic heaviness or pain. No vaginal discharge. States L nephrostomy tube is draining less, but is clear. R draining clear. No LE edema. Energy level is poor. The remainder of ROS is negative.\par

## 2020-03-19 NOTE — PHYSICAL EXAM
[Capable of only limited self care, confined to bed or chair more than 50% of waking hours] : Status 3- Capable of only limited self care, confined to bed or chair more than 50% of waking hours [Obese] : obese [Normal] : full range of motion and no deformities appreciated [de-identified] : Brought to room in wheel chair, well groomed, in NAD. [de-identified] : supple [de-identified] : clear [de-identified] : S1S2 tachy rate [de-identified] : negative pedal edema or calve tenderness [de-identified] : BS+, soft, nontender on palpation. [de-identified] : Bilateral nephrostomy tubes. Draining clear. Dsg D&I [de-identified] : without spinal or cva tenderness. [de-identified] : globally weak, able to raise self from standing position from chair, hold on to people/furniture w/ ambulation.

## 2020-04-28 PROBLEM — E83.42 HYPOMAGNESEMIA: Status: ACTIVE | Noted: 2020-01-01

## 2020-04-28 PROBLEM — E53.8 FOLATE DEFICIENCY: Status: ACTIVE | Noted: 2020-01-01

## 2020-04-28 PROBLEM — D50.9 IRON DEFICIENCY ANEMIA: Status: ACTIVE | Noted: 2020-01-01

## 2020-04-29 NOTE — ASSESSMENT
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  Developed local recurrence at vaginal apex which progressed through endocrine therapy. \par MRI pelvis 4/16/18 with increase in size of mass 6.2 cm, locally advanced.  Received 6 cycles of Carboplatin + Taxol completed on 9/7/18 with great response however MRI pelvis on 11/16/18 showed enlarging mass to which she received SBRT completed on 12/14/18.  4/10/19 PET with persistent avidity at vaginal cuff and activity suspicious for mets to lymph nodes. She began Keytruda on 4/2019, and switched to Doxil in 10/2019 for POD, then switched to Temsirolimus 2/27/20 for POD with worsening hydronephrosis / obstructive uropathy, s/p bilateral neph tube placement. S/p 2 doses of temsirolimus, last on 3/12/20. Secondary to Covid -19 virus concerns,  treatment plan changed to single agent Carbo w/ OnPro every 3 weeks. C1 Carbo (AUC 5) given on 3/27/20 w/ OnPro.\par \par Since C1 w/OnPro has increased c/o weakness/fatigue. Postponed C2 for 1 week, feels improved. Reviewed labs WBC 8.2, Hgb 8.7 g/dL, HCT 26.5%, MCV 85.2 fl and is normal. Iron studies done last visit show serum iron 17 ug/dL. Last Feraheme given in 2018. Folate is sl low at 4.6. Mag+ is 1.5.\par \par Plan: \par - Proceed today w/ C2 Carboplatin w/ a dose reduction to an AUC 4, for weakness/fatigue. \par - Anemia: hgb today is 8.7 g/dL. Repeat CBC on 5/01 ask to wait for results and decide if transfusion is needed.\par Advised oral iron 325 mg daily. Will discuss w/ Dr. Stanton if Feraheme or Procrit is advised.\par - Magnesium: hypomagnesium. Rx mag. oxide 400 mg daily. \par - Nephrostomy: per Dr. Roberts neph tubes need to be changed Q 3 months, last done on 2/12/20. L neph drainage is cloudy, is due for a bag change this week. Request for home RNs to obtain U/A and cx on left at time of bag change. Repeat CBC 1-2 days prior to nephrostomy tube change.\par - Poor appetite: did not tolerate Miratzapine, improved w/ short course of dexamethasone and now dronabinol.\par - TEB w/ Dr. Stanton for C3 Carbo/OnPro in 3 weeks.

## 2020-04-29 NOTE — HISTORY OF PRESENT ILLNESS
[de-identified] : Ms. SERA JOVEL was diagnosed with endometrial cancer at age 65.\par  She was initially diagnosed with a grade 1 endometrial cancer in 2015 at [facility]. At that time she was seen by Dr. Givens at [place].  She evidently was determined not to be a candidate for an abdominal surgery and a vaginal hysterectomy was recommended. She was taken to the OR for a VH, however, the procedure was aborted due to the "uterus being glued to the colon". She was then treated with whole pelvic RT 5040 cGY completed 10/15/15.  It does not appear that she received any brachytherapy. \par \par In June 2017 she again presented with heavy vaginal bleeding. A mass was noted to be protruding from the vagina and was biopsied showing G1 endometrioid adenocarcinoma. Screening for Encarnacion syndrome with MSI showed loss of expression of MLH1 and PMS2, however MLH promotor methylation was present suggesting a sporadic tumor. She was started on an aromatase inhibitor and referred to Dr. Pascale London for surgical consultation.  She underwent robotic laparoscopic hysterectomy and bilateral salpingo-oophorectomy on 10/12/2017.\par \par Vaginal apex recurrence s/p biopsy on 1/2/18 was positive for endometrial adenocarcinoma, c/w a recurrent endometrioid type adenocarcinoma, diffusely ER positive (almost 100%), and NJ positive (>90%).\par \par Treatment summary:\par 02/2018 - 3/2018 trial of Megace + tamoxifen alternating - developed increased vaginal spotting with Tamoxifen - stopped both meds. \par 05/11/18 - 9/7/18  6 cycles of carboplatin + taxol (q 3 week).\par 11/14/18 - completed vaginal cuff mass SBRT. \par 04/10/19 - PET with persistent avidity at vaginal cuff and activity suspicious for mets to lymph nodes.  \par 04/26/19 - 9/2019 Pembrolizumab (Keytruda)\par 10/28/19 - 1/21/20 Doxorubicin Liposomal [de-identified] : Ms. Akua Paige is here w/ her daughter Leonarda. She does not desire an , and her daughter will translate, if needed. Verbal consent given on 4/27/20 at 10:20 AM by Akua Paige for a TeleHealth visit. Patient was in exam room at [facility] and NP Bay was in her home office in [place], [place].\par Patient is aware that telehealth visit is necessary during COVID-19 pandemic. At the beginning of the live video portion of the office visit, the connection failed, and she is agreeable to transition to a telephone visit while in the exam room.\par \par Since last seen, has had low grade temps, ranging from 98.7- 100.5. She has SOB only w/ exertion (not new), no cough or CP. Appetite is improved, she is no longer on the short course of dexamethasone rx'd to increase appetite, and is on dronabinol twice daily, and is tolerating well. She has manageable constipation, w/ a loose stool 1X daily. She has no abdominal pain. The pelvic pressure she has reported in the past, is no longer present. Her nephrostomy tubes are draining, the R is draining clear and the L is cloudy. No LE edema. Energy level is poor, though better this week that chemo was pushed back one week. The remainder of ROS is negative.\par

## 2020-04-29 NOTE — PHYSICAL EXAM
[Capable of only limited self care, confined to bed or chair more than 50% of waking hours] : Status 3- Capable of only limited self care, confined to bed or chair more than 50% of waking hours [de-identified] : PE not performed. Complete ROS performed.

## 2020-05-01 PROBLEM — R82.90 CLOUDY URINE: Status: ACTIVE | Noted: 2020-01-01

## 2020-05-07 PROBLEM — G62.0 CHEMOTHERAPY-INDUCED PERIPHERAL NEUROPATHY: Status: ACTIVE | Noted: 2018-06-22

## 2020-05-07 NOTE — HISTORY OF PRESENT ILLNESS
[de-identified] : Ms. SERA JOVEL was diagnosed with endometrial cancer at age 65.\par  She was initially diagnosed with a grade 1 endometrial cancer in 2015 at [facility]. At that time she was seen by Dr. Givens at [place].  She evidently was determined not to be a candidate for an abdominal surgery and a vaginal hysterectomy was recommended. She was taken to the OR for a VH, however, the procedure was aborted due to the "uterus being glued to the colon". She was then treated with whole pelvic RT 5040 cGY completed 10/15/15.  It does not appear that she received any brachytherapy. \par \par In June 2017 she again presented with heavy vaginal bleeding. A mass was noted to be protruding from the vagina and was biopsied showing G1 endometrioid adenocarcinoma. Screening for Encarnacion syndrome with MSI showed loss of expression of MLH1 and PMS2, however MLH promotor methylation was present suggesting a sporadic tumor. She was started on an aromatase inhibitor and referred to Dr. Pascale London for surgical consultation.  She underwent robotic laparoscopic hysterectomy and bilateral salpingo-oophorectomy on 10/12/2017.\par \par Vaginal apex recurrence s/p biopsy on 1/2/18 was positive for endometrial adenocarcinoma, c/w a recurrent endometrioid type adenocarcinoma, diffusely ER positive (almost 100%), and WA positive (>90%).\par \par Treatment summary:\par 02/2018 - 3/2018 trial of Megace + tamoxifen alternating - developed increased vaginal spotting with Tamoxifen - stopped both meds. \par 05/11/18 - 9/7/18  6 cycles of carboplatin + taxol (q 3 week).\par 11/14/18 - completed vaginal cuff mass SBRT. \par 04/10/19 - PET with persistent avidity at vaginal cuff and activity suspicious for mets to lymph nodes.  \par 04/26/19 - 9/2019 Pembrolizumab (Keytruda)\par 10/28/19 - 1/21/20 Doxorubicin Liposomal [de-identified] : Verbal consent given by patient to conduct this telehealth visit via audio-video conferencing. \par \par Notes she tolerated last cycle of chemotherapy much better than the first. \par She is currently on Cefpodoxime for UTI\par No fevers x 4 days. \par No heaviness in the pelvis that she was having before. \par No nausea/vomiting. \par Her appetite is improved with Dranabinol\par Notes HIGGINS with stairs. \par No HIGGINS walking in her room. \par \par

## 2020-05-07 NOTE — PHYSICAL EXAM
[Capable of only limited self care, confined to bed or chair more than 50% of waking hours] : Status 3- Capable of only limited self care, confined to bed or chair more than 50% of waking hours [Normal] : well developed, well nourished, in no acute distress

## 2020-05-07 NOTE — ASSESSMENT
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  Developed local recurrence at vaginal apex which progressed through endocrine therapy. \par MRI pelvis 4/16/18 with increase in size of mass 6.2 cm, locally advanced.  Received 6 cycles of Carboplatin + Taxol completed on 9/7/18 with great response however MRI pelvis on 11/16/18 showed enlarging mass to which she received SBRT completed on 12/14/18.  4/10/19 PET with persistent avidity at vaginal cuff and activity suspicious for mets to lymph nodes. She began Keytruda on 4/2019, and switched to Doxil in 10/2019 for POD, then switched to Temsirolimus 2/27/20 for POD with worsening hydronephrosis / obstructive uropathy, s/p bilateral neph tube placement. S/p 2 doses of temsirolimus, last on 3/12/20. Secondary to Covid -19 virus concerns and concerns of clinical POD,  treatment plan changed to single agent Carbo w/ OnPro every 3 weeks. C1 Carbo (AUC 5) given on 3/27/20 w/ OnPro.  On 4/27/20, carbo changed to AUC 4 for better tolerability. \par \par Tolerated Carbo AUC 4 much better than prior  higher dose.\par Appetite is better.\par Had HIGGINS\par \par Plan: \par -continue carbo AUC 4 q 3 weeks, will plan to do restaging CT scans in mid June, after 4th carbo dose\par -HIGGINS is likely secondary to anemia due to chemotherapy - check CBC next visit, may need prbc transfusion\par -low mag - continue mag oxide\par -obstructive uropathy - s/p b/l neph tubes, due for change next week, need change q 3 months\par - Poor appetite: continue dronabinol\par -CIPN - continue gapapentin\par -Follow up in 3 weeks with Delisa / ZEHRA home\par

## 2020-05-28 NOTE — ASSESSMENT
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  Developed local recurrence at vaginal apex which progressed through endocrine therapy. \par MRI pelvis 4/16/18 with increase in size of mass 6.2 cm, locally advanced.  Received 6 cycles of Carboplatin + Taxol completed on 9/7/18 with great response however MRI pelvis on 11/16/18 showed enlarging mass to which she received SBRT completed on 12/14/18.  4/10/19 PET with persistent avidity at vaginal cuff and activity suspicious for mets to lymph nodes. She began Keytruda on 4/2019, and switched to Doxil in 10/2019 for POD, then switched to Temsirolimus 2/27/20 for POD with worsening hydronephrosis / obstructive uropathy, s/p bilateral neph tube placement. S/p 2 doses of temsirolimus, last on 3/12/20. Secondary to Covid -19 virus concerns and concerns of clinical POD,  treatment plan changed to single agent Carbo w/ OnPro every 3 weeks. C1 Carbo (AUC 5) given on 3/27/20 w/ OnPro. On 4/27/20, carbo changed to AUC 4 for better tolerability. Received Feraheme 1/2 on 5/18 for iron deficiency.\par \par S/p 2 UPRBCs on 3/19 and C3 Carbo (AUC 4) on 3/22/20, tolerated well. Feels improved post transfusion and iron.\par \par Plan: \par -Continue carbo AUC 4 q 3 weeks, restage w/ CT scans in mid June, after 4th carbo dose\par -Anemia: hgb 6.4 g/dL on 5/18, received 1 dose of Feraheme on 5/18 and 2 UPRBCs on 5/19. Second Feraheme w/ next carbo. \par -Low mag - continue mag oxide (renewed)\par -Obstructive uropathy - s/p b/l neph tubes change on 5/12 (needs change q 3 months ~ 8/12/20).\par -Appetite: slight improvement\par -CIPN - continue gapapentin, ALA, B6\par -F/u w/ Dr. Stanton post restaging CT scans.

## 2020-05-28 NOTE — HISTORY OF PRESENT ILLNESS
[de-identified] : Ms. SERA JOVEL was diagnosed with endometrial cancer at age 65.\par  She was initially diagnosed with a grade 1 endometrial cancer in 2015 at [facility]. At that time she was seen by Dr. Givens at [place].  She evidently was determined not to be a candidate for an abdominal surgery and a vaginal hysterectomy was recommended. She was taken to the OR for a VH, however, the procedure was aborted due to the "uterus being glued to the colon". She was then treated with whole pelvic RT 5040 cGY completed 10/15/15.  It does not appear that she received any brachytherapy. \par \par In June 2017 she again presented with heavy vaginal bleeding. A mass was noted to be protruding from the vagina and was biopsied showing G1 endometrioid adenocarcinoma. Screening for Encarnacion syndrome with MSI showed loss of expression of MLH1 and PMS2, however MLH promotor methylation was present suggesting a sporadic tumor. She was started on an aromatase inhibitor and referred to Dr. Pascale London for surgical consultation.  She underwent robotic laparoscopic hysterectomy and bilateral salpingo-oophorectomy on 10/12/2017.\par \par Vaginal apex recurrence s/p biopsy on 1/2/18 was positive for endometrial adenocarcinoma, c/w a recurrent endometrioid type adenocarcinoma, diffusely ER positive (almost 100%), and AL positive (>90%).\par \par Treatment summary:\par 02/2018 - 3/2018 trial of Megace + tamoxifen alternating - developed increased vaginal spotting with Tamoxifen - stopped both meds. \par 05/11/18 - 9/7/18  6 cycles of carboplatin + taxol (q 3 week).\par 11/14/18 - completed vaginal cuff mass SBRT. \par 04/10/19 - PET with persistent avidity at vaginal cuff and activity suspicious for mets to lymph nodes.  \par 04/26/19 - 9/2019 Pembrolizumab (Keytruda)\par 10/28/19 - 1/21/20 Doxorubicin Liposomal [de-identified] : Verbal consent given on 11/08 at 2:45 PM by Mrs. Paige's daughter, Leonarda, whose is the selected  for patient, for a TeleHealth phone visit. Patient was home and NP Bay was in exam room at [facility]. initially attempted video, but failed, transitioned to phone note.\par Mrs. Paige is aware that telehealth visit is necessary during COVID-19 pandemic.\par Leonarda states she is feeling improved. Denies fevers, SOB w/ exertion only, and less so, no CP, appetite is good, manageable constipation, but mentions w/ constipation does have pelvic pressure, relieved post movement. Intermittent diarrhea 1 week post chemo, drinking plenty of fluids, then resolves. No pain/burning w/ urination, nephrostomy tube change on 5/12, RN is flushing 3X's/week. No vaginal discharge or bleeding. No LE edema. Energy level is slightly improved. The remainder of ROS is negative.\par \par

## 2020-06-25 PROBLEM — R53.83 FATIGUE DUE TO TREATMENT: Status: ACTIVE | Noted: 2020-01-01

## 2020-06-25 PROBLEM — N13.9 OBSTRUCTIVE UROPATHY: Status: ACTIVE | Noted: 2020-01-01

## 2020-06-25 PROBLEM — D64.81 ANEMIA DUE TO ANTINEOPLASTIC CHEMOTHERAPY: Status: ACTIVE | Noted: 2018-07-13

## 2020-06-25 NOTE — ASSESSMENT
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  Developed local recurrence at vaginal apex which progressed through endocrine therapy. \par MRI pelvis 4/16/18 with increase in size of mass 6.2 cm, locally advanced.  Received 6 cycles of Carboplatin + Taxol completed on 9/7/18 with great response however MRI pelvis on 11/16/18 showed enlarging mass to which she received SBRT completed on 12/14/18.  4/10/19 PET with persistent avidity at vaginal cuff and activity suspicious for mets to lymph nodes. She began Keytruda on 4/2019, and switched to Doxil in 10/2019 for POD, then switched to Temsirolimus 2/27/20 for POD with worsening hydronephrosis / obstructive uropathy, s/p bilateral neph tube placement. S/p 2 doses of temsirolimus, last on 3/12/20. Secondary to Covid -19 virus concerns and concerns of clinical POD,  treatment plan changed to single agent Carbo w/ OnPro every 3 weeks. C1 Carbo (AUC 5) given on 3/27/20 w/ OnPro. On 4/27/20, carbo changed to AUC 4 for myelosuppression & better tolerability. Received Feraheme 1/2 on 5/18 for iron deficiency.\par \par S/p 4 cycles of Carboplatin.  Notes significant fatigue and constipation with this cycle, spiked fever to 102F on 6/21 but none since then. \par \par Plan: \par -Fatigue is secondary to treatment - and is expected to improve over the next week\par -await UA/UC done by PCP, continue cipro for now\par -due for restaging CT scans, will schedule for next week. 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 \par -Low mag - continue mag oxide (renewed)\par -Obstructive uropathy - s/p b/l neph tubes change on 5/12 (needs change q 3 months ~ 8/12/20).\par -CIPN - continue gapapentin, ALA, B6\par -Follow up in 2 weeks

## 2020-06-25 NOTE — HISTORY OF PRESENT ILLNESS
[de-identified] : Ms. SERA JOVEL was diagnosed with endometrial cancer at age 65.\par  She was initially diagnosed with a grade 1 endometrial cancer in 2015 at [facility]. At that time she was seen by Dr. Givens at [place].  She evidently was determined not to be a candidate for an abdominal surgery and a vaginal hysterectomy was recommended. She was taken to the OR for a VH, however, the procedure was aborted due to the "uterus being glued to the colon". She was then treated with whole pelvic RT 5040 cGY completed 10/15/15.  It does not appear that she received any brachytherapy. \par \par In June 2017 she again presented with heavy vaginal bleeding. A mass was noted to be protruding from the vagina and was biopsied showing G1 endometrioid adenocarcinoma. Screening for Encarnacion syndrome with MSI showed loss of expression of MLH1 and PMS2, however MLH promotor methylation was present suggesting a sporadic tumor. She was started on an aromatase inhibitor and referred to Dr. Pascale London for surgical consultation.  She underwent robotic laparoscopic hysterectomy and bilateral salpingo-oophorectomy on 10/12/2017.\par \par Vaginal apex recurrence s/p biopsy on 1/2/18 was positive for endometrial adenocarcinoma, c/w a recurrent endometrioid type adenocarcinoma, diffusely ER positive (almost 100%), and TX positive (>90%).\par \par Treatment summary:\par 02/2018 - 3/2018 trial of Megace + tamoxifen alternating - developed increased vaginal spotting with Tamoxifen - stopped both meds. \par 05/11/18 - 9/7/18  6 cycles of carboplatin + taxol (q 3 week).\par 11/14/18 - completed vaginal cuff mass SBRT. \par 04/10/19 - PET with persistent avidity at vaginal cuff and activity suspicious for mets to lymph nodes.  \par 04/26/19 - 9/2019 Pembrolizumab (Keytruda)\par 10/28/19 - 1/21/20 Doxorubicin Liposomal [de-identified] : Verbal consent given by patient to conduct this telehealth visit via audio-video conferencing on 6/\par \par Reports fever up to 102F about 4 days ago, and then low grade intermittent to 99.7 up to 100 F.\par She had bilateral nephrostomy tube change on 5/12/20. \par Constipation this cycle as well, but had bowel movement today.\par She is always having an urge for BM. \par She is eating well. \par She is not moving as much compared to 2 weeks ago.\par Seen by PCP, started on Cipro yesterday for WBC 16K, and h/o fever to 102.  UA/UC was done and is pending.\par \par \par \par

## 2020-07-02 NOTE — LETTER BODY
[Dear  ___] : Dear  [unfilled], [Courtesy Letter:] : I had the pleasure of seeing your patient, [unfilled], in my office today. [Sincerely,] : Sincerely, [Please see my note below.] : Please see my note below. [FreeTextEntry3] : Ed\par \par Shawn Roberts MD\par [facility]\par  of Urology\par [employer] [facility]\par

## 2020-07-02 NOTE — ASSESSMENT
[FreeTextEntry1] : Impression:\par ureteral obstruction.\par \par Plan:\par \par would not check a urinalysis from neph tubes. \par treat based on culture for prolonged fever, flank pain or cellulitis around neph tube site.

## 2020-07-02 NOTE — PHYSICAL EXAM
[General Appearance - Well Developed] : well developed [General Appearance - Well Nourished] : well nourished [Well Groomed] : well groomed [Normal Appearance] : normal appearance [General Appearance - In No Acute Distress] : no acute distress [Costovertebral Angle Tenderness] : no ~M costovertebral angle tenderness [FreeTextEntry1] : urine clear yellow without sediment.  [Skin Color & Pigmentation] : normal skin color and pigmentation [Affect] : the affect was normal [Mood] : the mood was normal [Oriented To Time, Place, And Person] : oriented to person, place, and time [Not Anxious] : not anxious [Normal Station and Gait] : the gait and station were normal for the patient's age [No Focal Deficits] : no focal deficits

## 2020-07-02 NOTE — HISTORY OF PRESENT ILLNESS
[FreeTextEntry1] : Patient presents in follow up of neph tubes.  no difficulty with urine draining. Patient is drinking lots of fluids.  The primary obtained urinalysis. was abnormal but no

## 2020-07-13 PROBLEM — C54.1 ENDOMETRIAL CANCER: Status: ACTIVE | Noted: 2017-09-05

## 2020-07-15 PROBLEM — R93.5 ABNORMAL CT SCAN, PELVIS: Status: ACTIVE | Noted: 2020-01-01

## 2020-07-15 PROBLEM — N82.0 VESICOVAGINAL FISTULA: Status: ACTIVE | Noted: 2019-01-01

## 2020-07-15 PROBLEM — K62.89 PROCTITIS: Status: ACTIVE | Noted: 2020-01-01

## 2020-07-15 NOTE — HISTORY OF PRESENT ILLNESS
[de-identified] : Ms. SERA JOVEL was diagnosed with endometrial cancer at age 65.\par  She was initially diagnosed with a grade 1 endometrial cancer in 2015 at [facility]. At that time she was seen by Dr. Givens at [place].  She evidently was determined not to be a candidate for an abdominal surgery and a vaginal hysterectomy was recommended. She was taken to the OR for a VH, however, the procedure was aborted due to the "uterus being glued to the colon". She was then treated with whole pelvic RT 5040 cGY completed 10/15/15.  It does not appear that she received any brachytherapy. \par \par In June 2017 she again presented with heavy vaginal bleeding. A mass was noted to be protruding from the vagina and was biopsied showing G1 endometrioid adenocarcinoma. Screening for Encarnacion syndrome with MSI showed loss of expression of MLH1 and PMS2, however MLH promotor methylation was present suggesting a sporadic tumor. She was started on an aromatase inhibitor and referred to Dr. Pascale London for surgical consultation.  She underwent robotic laparoscopic hysterectomy and bilateral salpingo-oophorectomy on 10/12/2017.\par \par Vaginal apex recurrence s/p biopsy on 1/2/18 was positive for endometrial adenocarcinoma, c/w a recurrent endometrioid type adenocarcinoma, diffusely ER positive (almost 100%), and OK positive (>90%).\par \par Treatment summary:\par 02/2018 - 3/2018 trial of Megace + tamoxifen alternating - developed increased vaginal spotting with Tamoxifen - stopped both meds. \par 05/11/18 - 9/7/18  6 cycles of carboplatin + taxol (q 3 week).\par 11/14/18 - completed vaginal cuff mass SBRT. \par 04/10/19 - PET with persistent avidity at vaginal cuff and activity suspicious for mets to lymph nodes.  \par 04/26/19 - 9/2019 Pembrolizumab (Keytruda)\par 10/28/19 - 1/21/20 Doxorubicin Liposomal [de-identified] : Verbal consent given by patient to conduct this telehealth visit via audio-video conferencing. \par \par She is having mucus in bowel movements - now having up to 4 bowel movements. \par Bowel movements are soft.\par Low grade fever intermittent up to 100 F\par Appetite is okay. \par She is resting more. \par She had bilateral nephrostomy tube change on 5/12/20. \par No HIGGINS. \par She is not taking pain medications. \par \par \par

## 2020-07-15 NOTE — ASSESSMENT
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  Developed local recurrence at vaginal apex which progressed through endocrine therapy. \par MRI pelvis 4/16/18 with increase in size of mass 6.2 cm, locally advanced.  Received 6 cycles of Carboplatin + Taxol completed on 9/7/18 with great response however MRI pelvis on 11/16/18 showed enlarging mass to which she received SBRT completed on 12/14/18.  4/10/19 PET with persistent avidity at vaginal cuff and activity suspicious for mets to lymph nodes. She began Keytruda on 4/2019, and switched to Doxil in 10/2019 for POD, then switched to Temsirolimus 2/27/20 for POD with worsening hydronephrosis / obstructive uropathy, s/p bilateral neph tube placement. S/p 2 doses of temsirolimus, last on 3/12/20. Secondary to Covid -19 virus concerns and concerns of clinical POD,  treatment plan changed to single agent Carbo w/ OnPro every 3 weeks. C1 Carbo (AUC 5) given on 3/27/20 w/ OnPro. On 4/27/20, carbo changed to AUC 4 for myelosuppression & better tolerability. Received Feraheme 1/2 on 5/18 for iron deficiency.\par \par S/p 5 cycles of carboplatin.  \par Reviewed 7/6/20 CT scans which show pelvic mass increased by 1 cm, and left obturator internus lymph  node also increased from 0.6 cm to 1.1 cm, and rectal wall thickening compatible with proctitis.\par \par Discussed CT pelvis cystogram / rectal contrast to evaluate for fistulas previously seen in 1/2020 studies.\par Given slow progression of disease, can continue carboplatin for a few more cycles, other options include going back to temsirolimus as she did not fail it, or avastin but increased risk of VTE, Gi perforation, fistula development.\par \par Plan: \par -Proctitis - ?infectious vs secondary to RT - will first treat with Augmentin x 1 week, if no improvement, will send to colorectal surgery\par -will get CT with cystogram and rectal contrast to r/o fistulas\par -Obstructive uropathy - s/p b/l neph tubes change on 5/12 (needs change q 3 months ~ 8/12/20).\par -CIPN - continue gapapentin, ALA, B6\par -Follow up in 2 weeks

## 2020-07-29 PROBLEM — Z00.00 ENCOUNTER FOR PREVENTIVE HEALTH EXAMINATION: Status: ACTIVE | Noted: 2017-08-24

## 2020-07-29 NOTE — ED PROVIDER NOTE - PROGRESS NOTE DETAILS
Pt consented for PRBC x 2.  Will treat for UTI with meropenem.  GYN[facility] consulted. GYN[facility] to admit pt.  Requesting 3rd unit of PRBC.

## 2020-07-29 NOTE — ED ADULT NURSE REASSESSMENT NOTE - NS ED NURSE REASSESS COMMENT FT1
Assumed pt care @ 1930. Pt sitting upright in stretcher in no apparent signs of distress with son bedside, pt requesting son translate for her. Pt placed on , MD Perez aware and bedside for sono assisted PIV placement for CTA. Pt A&Ox4 with no complaints of pain or discomfort at this time, refer to flowsheet and chart, pt ID band in place, pt safety maintained, pt hemodynamically stable, updated on plan of care. Awaiting PIV access and CTA. Call light provided, fall safety reinforced. Will continue to monitor

## 2020-07-29 NOTE — ED PROVIDER NOTE - CLINICAL SUMMARY MEDICAL DECISION MAKING FREE TEXT BOX
69y F w/ endometrial cancer, b/l nephrostomies, presenting for abdominal pain, melena and vaginal bleeding for the past 1-2 days.  Recently found to have fistula between L ureter and pelvic mass.  Pt tachycardic on arrival but hemodynamically stable, afebrile, in no acute distress.  Guaiac positive stool.  Will obtain labs, EKG, CXR, CTA abd/pelvis, cultures.  Treat with fluids and reassess.  Will consult GYN-ON[facility].

## 2020-07-29 NOTE — ED PROVIDER NOTE - PHYSICAL EXAMINATION
Constitutional: Awake, alert, in no acute distress  Eyes: no scleral icterus  HENT: normocephalic, atraumatic, moist oral mucosa  CV: RRR, no murmur  Pulm: non-labored respirations, CTAB  Abdomen: soft, non-tender, mildly distended, no CVAT  Rectal: +black stool in vault, no masses.  Exam chaperoned by RN Jenna Taylor.  Extremities: no edema, no deformity  Skin: no rash, no jaundice  Neuro: AAOx3, moving all extremities equally Constitutional: Awake, alert, in no acute distress  Eyes: no scleral icterus  HENT: normocephalic, atraumatic, moist oral mucosa  CV: RRR, no murmur  Pulm: non-labored respirations, CTAB  Abdomen: soft, non-tender, mildly distended, no CVAT, +b/l nephrostomy tubes in place  Rectal: +black stool in vault, no masses.  Exam chaperoned by RN Jenna Taylor.  Extremities: no edema, no deformity  Skin: no rash, no jaundice  Neuro: AAOx3, moving all extremities equally

## 2020-07-29 NOTE — ED ADULT NURSE NOTE - OBJECTIVE STATEMENT
Pt awake, alert and oriented x3 c/o lower abdominal pain with vaginal bleeding and increased frequency of bowel movements.  As per son, pt had recent cystoscopy.  B/l nephrostomy tubes in place.

## 2020-07-29 NOTE — ED PROVIDER NOTE - ATTENDING CONTRIBUTION TO CARE
The patient seen and examined    Anemia    I, Bridger Ramirez, performed the initial face to face bedside interview with this patient regarding history of present illness, review of symptoms and relevant past medical, social and family history.  I completed an independent physical examination.  I was the initial provider who evaluated this patient. I have signed out the follow up of any pending tests (i.e. labs, radiological studies) to the resident.  I have communicated the patient’s plan of care and disposition with the resident.

## 2020-07-29 NOTE — ED PROVIDER NOTE - NS ED ROS FT
Constitutional: no fever, no chills  Eyes: no vision changes  ENT: no nasal congestion, no sore throat  CV: no chest pain  Resp: no cough, no shortness of breath  GI: +abdominal pain, +melena, no vomiting, no diarrhea  : no dysuria, +vaginal bleed  MSK: no joint pain  Skin: no rash  Neuro: no headache, no weakness, no paresthesias

## 2020-07-29 NOTE — ED PROVIDER NOTE - CARE PLAN
Principal Discharge DX:	Anemia  Secondary Diagnosis:	UTI (urinary tract infection)  Secondary Diagnosis:	Rectal bleed  Secondary Diagnosis:	Endometrial cancer

## 2020-07-29 NOTE — ED PROVIDER NOTE - OBJECTIVE STATEMENT
69y F w/ endometrial cancer s/p TAHBSO, b/l nephrostomy, presenting for abdominal pain.  Pt had CT pelvis done 2 days to evaluate for fistula; findings consistent with fistula of left ureter to the vaginal cuff/pelvic tumor.  They were unable to evaluate for vesicovaginal fistula because they could not place Stokes, but there was no evidence of colorectal fistula.  Pt now complaining of abdominal pain since the scan, with black stools and vaginal bleeding today.  Denies fever, cough, chest pain, SOB, n/v/d.  Last underwent chemo 3 weeks ago.  Oncologist is Dr. Stanton.  Previously saw Dr. Pascale London of [facility], but not since a few years ago.

## 2020-07-29 NOTE — ED PROVIDER NOTE - PSH
H/O cataract extraction, left  2014  History of hip replacement  6/2013  History of total bilateral knee replacement  right in 2010, left in 2015  Nephrostomy status  bilateral

## 2020-07-29 NOTE — ED ADULT TRIAGE NOTE - CHIEF COMPLAINT QUOTE
Hx of Endometrial cancer, treated an [facility].  Had cystogram and rectal scan with contrast. Now with abdominal pressure.  Denies chest pain/shortness of breath.

## 2020-07-29 NOTE — ED ADULT NURSE NOTE - INTERVENTIONS DEFINITIONS
Call bell, personal items and telephone within reach/Instruct patient to call for assistance/Non-slip footwear when patient is off stretcher/Physically safe environment: no spills, clutter or unnecessary equipment/[place] to call system/Monitor gait and stability

## 2020-07-29 NOTE — ED ADULT NURSE NOTE - CHIEF COMPLAINT QUOTE
Hx of Endometrial cancer, treated an [facility].  Had cystogram and rectal scan with contrast. Now with abdominal pressure.  Denies chest pain/shortness of breath.

## 2020-07-30 NOTE — H&P ADULT - HISTORY OF PRESENT ILLNESS
SERA JOVEL is a 70yo F who was diagnosed with endometrial cancer at age 65.  She was initially diagnosed with a grade 1 endometrial cancer in 2015 at [facility]. At that time she was seen by Dr. Givens at [place]. She evidently was determined not to be a candidate for an abdominal surgery and a vaginal hysterectomy was recommended. She was taken to the OR for a VH, however, the procedure was aborted due to the "uterus being glued to the colon". She was then treated with whole pelvic RT 5040 cGY completed 10/15/15. It does not appear that she received any brachytherapy.     In June 2017 she again presented with heavy vaginal bleeding. A mass was noted to be protruding from the vagina and was biopsied showing G1 endometrioid adenocarcinoma. Screening for Encarnacion syndrome with MSI showed loss of expression of MLH1 and PMS2, however MLH promotor methylation was present suggesting a sporadic tumor. She was started on an aromatase inhibitor and referred to Dr. Pascale London for surgical consultation. She underwent robotic laparoscopic hysterectomy and bilateral salpingo-oophorectomy on 10/12/2017.    Vaginal apex recurrence s/p biopsy on 1/2/18 was positive for endometrial adenocarcinoma, c/w a recurrent endometrioid type adenocarcinoma, diffusely ER positive (almost 100%), and OH positive (>90%).    Treatment summary:  02/2018 - 3/2018 trial of Megace + tamoxifen alternating - developed increased vaginal spotting with Tamoxifen - stopped both meds.   05/11/18 - 9/7/18 6 cycles of carboplatin + taxol (q 3 week).  11/14/18 - completed vaginal cuff mass SBRT.   04/10/19 - PET with persistent avidity at vaginal cuff and activity suspicious for mets to lymph nodes.   04/26/19 - 9/2019 Pembrolizumab (Keytruda)  10/28/19 - 1/21/20 Doxorubicin Liposomal.     Pt had a CT scan on 7/27 with report of pelvic mass inseparable from the vaginal cuff and posterior aspect of the bladder and anterior wall of the rectum.  The CT cystogram was aborted due to inability to place Stokes catheter.  the CT from today inpatient:  < from: CT Angio Abdomen and Pelvis w/ IV Cont (07.29.20 @ 22:18) >  1. Oral contrast on the left-sided colon limits the ability to evaluate for active bleeding.  2. Large pelvic tumor, significantly increased in size. The tumor appears to invade and obstruct the rectosigmoid colon. At the site of invasion, there may be a small amount of active bleeding.  3. Air in the renal pelvises bilaterally, with dilated distal right ureter and both ureters indistinguishable from the tumor distally. These findings and the presence of air within the tumor may be compatible with fistula as previously questioned.  4. Extensive invasion of the bladder by tumor, not evident previously.    < end of copied text >    Pt has bilateral nephrostomies due to ureters bilaterally involved with CA.  Pt has a vesicovaginal fistula with vaginal bleeding.  Pt is currently complaining of diarrhea and needs to use the bathroom every 20-30 min, the stool is black in color and positive for occult blood    Pt is also symptomatic, with lightheadedness, weakness.

## 2020-07-30 NOTE — PROGRESS NOTE ADULT - SUBJECTIVE AND OBJECTIVE BOX
GYNECOLOGIC ONCOLOGY PROGRESS NOTE    HD#2    PROBLEMS:  Anemia  Endometrial cancer  Suspected deep vein thrombosis (DVT)  Vesicovaginal fistula  Bilateral Nephrostomies   Melena     Pt seen and examined at bedside with Dr. Gonzales Peterson and Dr. Cannon    SUBJECTIVE:    Per nursing, pt receiving 3rd unit of PRBC at this time.   Daughter at bedside helping with interpretation. Reports her mother seems sleepier than usual after getting morphine for pain. She reports that her mother has been complaining of significant rectal/pelvic pressure. She does not take any standing pain medications at home. Uses Percocet PRN rarely. Pt still with vaginal bleeding.   Flatus: Yes   Denies Nausea, Vomiting or Diarrhea.  Denies shortness of breath, chest pain or dyspnea on exertion.  Tolerating diet.   Bilateral nephrostomy tubes functional. Last semi-solid BM yesterday.     OBJECTIVE:     VITALS:  T(F): 99.7 (20 @ 07:45), Max: 99.7 (20 @ 07:45)  HR: 120 (20 @ 07:45) (99 - 135)  BP: 111/75 (20 @ 07:45) (107/70 - 129/72)  RR: 18 (20 @ 07:45) (18 - 20)  SpO2: 97% (20 @ 07:45) (97% - 100%)      I&O's Summary    2020 07:01  -  2020 07:00  --------------------------------------------------------  IN: 600 mL / OUT: 950 mL / NET: -350 mL        MEDICATIONS  (STANDING):  cyanocobalamin 1000 MICROGram(s) Oral daily  dronabinol 2.5 milliGRAM(s) Oral two times a day  gabapentin Oral Tab/Cap - Peds 300 milliGRAM(s) Oral two times a day  pyridoxine 100 milliGRAM(s) Oral daily    MEDICATIONS  (PRN):  acetaminophen   Tablet .. 650 milliGRAM(s) Oral every 6 hours PRN Mild Pain (1 - 3)      Physical Exam:  Constitutional: NAD, Sleepy   Pulmonary: clear to auscultation bilaterally   Cardiovascular: tachycardiac, Regular rate and rhythm   Abdomen: obese, soft, non-tender, normal bowel signs, dull, abdominal fullness noted    Extremities: LT>RT calve tenderness, no edema.  Pelvic: moderately soiled pad      LABS:                        9.5    12.84 )-----------( 303      ( 2020 07:36 )             28.6     07-30    136  |  97<L>  |  12.0  ----------------------------<  91  4.4   |  24.0  |  0.81    Ca    8.5<L>      2020 07:36    TPro  7.0  /  Alb  2.8<L>  /  TBili  0.3<L>  /  DBili  x   /  AST  17  /  ALT  10  /  AlkPhos  84  07-29    PT/INR - ( 2020 18:45 )   PT: 15.4 sec;   INR: 1.35 ratio         PTT - ( 2020 18:45 )  PTT:31.0 sec  Urinalysis Basic - ( 2020 18:54 )    Color: Yellow / Appearance: Slightly Turbid / S.010 / pH: x  Gluc: x / Ketone: Negative  / Bili: Negative / Urobili: Negative   Blood: x / Protein: 100 / Nitrite: Positive   Leuk Esterase: Moderate / RBC: 0-2 /HPF / WBC 11-25   Sq Epi: x / Non Sq Epi: Occasional / Bacteria: Moderate      RADIOLOGY & ADDITIONAL TESTS:    < from: CT Angio Abdomen and Pelvis w/ IV Cont (20 @ 22:18) >   EXAM:  CT ANGIO ABD PELV (W)AW IC                          PROCEDURE DATE:  2020          INTERPRETATION:  CLINICAL INFORMATION:  abd pain, lower GI bleeding, vaginal bleeding, history of endometrial cancer with fistula from the left ureter to the vaginal cuff  COMPARISON: 2020  PROCEDURE:  CT of the Abdomen and Pelvis was performed with and without intravenous contrast.  Precontrast, Arterial and Delayed phases were performed.  Intravenous contrast: 90 ml Omnipaque 350. 10 ml discarded.  Oral contrast: None.  Sagittal and coronal reformats were performed.    FINDINGS:  LIMITATIONS: Beam hardening artifact from the presence of the patient's arms within the field of the beam. There is also oral contrast in the left side of the colon, limiting ability to evaluate for active bleeding.    LOWER CHEST: Within normal limits.  ABDOMINAL WALL: Within normal limits.  VESSELS: Within normal limits.  BOWEL: No evidence of obstruction. Normal distal esophagus, stomach and duodenum. Thereis invasion of the rectum by the pelvic tumor with wall thickening of the rectum. Immediately afferent to the tumor, there is mildly dilated colon which contains oral contrast. The contrast does not pass the tumor. Appendix appears to have been removed. There may be a small focus of bleeding at the tumor site on the arterial phase sequence (series 10, image 117) that is not seen on the noncontrast study.    LIVER: Within normal limits.  BILE DUCTS: Normal caliber  GALLBLADDER: Distended  SPLEEN: Within normal limits.  PANCREAS: Within normal limits.  ADRENALS: Left adrenal nodule measuring 1.9 cm, not clearly benign.  KIDNEYS/URETERS: Bilateral percutaneous nephrostomies with the tips coiled in the renal pelvises bilaterally. Small amount of air in the renal pelvises bilaterally. No hydronephrosis. The distal right ureter is dilated and indistinguishable from the pelvic mass. The distal left ureter is indistinguishable from the pelvic mass.    BLADDER: There is extensive bladder invasion by tumor.  REPRODUCTIVE ORGANS: Absent uterus. In the uterine bed, there is a lobulated, necrotic mass with areas of air extensive adjacent inflammatory changes measuring approximately 10.0 x 8.9 x 13.8 cm. Mass previously measured 9.5 x 6.8 x 5.7 cm.    PERITONEUM: No ascites.  RETROPERITONEUM/LYMPH NODES: Mild lymphadenopathy, extending along the left iliac chain.    BONES: No acute osseous pathology. Right hip replacement.    IMPRESSION:  1. Oral contrast on the left-sided colon limits the ability to evaluate for active bleeding.  2. Large pelvic tumor, significantly increased in size. The tumor appears to invade and obstruct the rectosigmoid colon. At the site of invasion, there may be a small amount of active bleeding.  3. Air in the renal pelvises bilaterally, with dilated distal right ureter and both ureters indistinguishable from the tumor distally. These findings and the presence of air within the tumor may be compatible with fistula as previously questioned.  4. Extensive invasion of the bladder by tumor, not evident previously.    ELIDA THOMAS   This document has been electronically signed. 2020 11:02PM    < end of copied text >

## 2020-07-30 NOTE — H&P ADULT - ASSESSMENT
a 68yo F with recurrent endometrial canter now with disease involving avaginal cuff, bladder, ureters, rectosigmoid colon with vaginal bleeding, melena admitted to the GYN ONC service for blood transfusion for symptomatic anemia, treatment of recurrent UTI(meropenem started in ED), management of diarrhea  -transfuse 3 units and maintain hemoglobin above 9  -consult ID if pt does not respond to antibiotic therapy for UTI  -consult Heme Onc, Dr. Andrade for recommendations regarding further chemotherapy  -consult palliative care in the morning

## 2020-07-30 NOTE — CONSULT NOTE ADULT - SUBJECTIVE AND OBJECTIVE BOX
SURGICAL PA NOTE: Urology consult note    CTSP for obstructed distal ureters/bladder invasion from metastatic endometrial ca    STATUS POST:      POST OPERATIVE DAY #:     Vital Signs Last 24 Hrs  T(C): 37.3 (2020 16:25), Max: 37.6 (2020 07:45)  T(F): 99.2 (2020 16:25), Max: 99.7 (2020 07:45)  HR: 113 (2020 16:25) (105 - 123)  BP: 132/85 (2020 16:25) (107/70 - 132/85)  BP(mean): --  RR: 20 (2020 16:25) (18 - 20)  SpO2: 97% (2020 16:25) (97% - 100%)    HPI:  SERA JOVEL is a 70yo F who was diagnosed with endometrial cancer at age 65.  She was initially diagnosed with a grade 1 endometrial cancer in  at [facility]. At that time she was seen by Dr. Givens at [place]. She evidently was determined not to be a candidate for an abdominal surgery and a vaginal hysterectomy was recommended. She was taken to the OR for a VH, however, the procedure was aborted due to the "uterus being glued to the colon". She was then treated with whole pelvic RT 5040 cGY completed 10/15/15. It does not appear that she received any brachytherapy.     In 2017 she again presented with heavy vaginal bleeding. A mass was noted to be protruding from the vagina and was biopsied showing G1 endometrioid adenocarcinoma. Screening for Encarnacion syndrome with MSI showed loss of expression of MLH1 and PMS2, however MLH promotor methylation was present suggesting a sporadic tumor. She was started on an aromatase inhibitor and referred to Dr. Pascale London for surgical consultation. She underwent robotic laparoscopic hysterectomy and bilateral salpingo-oophorectomy on 10/12/2017.    Vaginal apex recurrence s/p biopsy on 18 was positive for endometrial adenocarcinoma, c/w a recurrent endometrioid type adenocarcinoma, diffusely ER positive (almost 100%), and ID positive (>90%).    Treatment summary:  2018 - 3/2018 trial of Megace + tamoxifen alternating - developed increased vaginal spotting with Tamoxifen - stopped both meds.   18 - 18 6 cycles of carboplatin + taxol (q 3 week).  18 - completed vaginal cuff mass SBRT.   04/10/19 - PET with persistent avidity at vaginal cuff and activity suspicious for mets to lymph nodes.   19 - 2019 Pembrolizumab (Keytruda)  10/28/19 - 20 Doxorubicin Liposomal.     Pt had a CT scan on  with report of pelvic mass inseparable from the vaginal cuff and posterior aspect of the bladder and anterior wall of the rectum.  The CT cystogram was aborted due to inability to place Stokes catheter.  the CT from today inpatient:  < from: CT Angio Abdomen and Pelvis w/ IV Cont (20 @ 22:18) >  1. Oral contrast on the left-sided colon limits the ability to evaluate for active bleeding.  2. Large pelvic tumor, significantly increased in size. The tumor appears to invade and obstruct the rectosigmoid colon. At the site of invasion, there may be a small amount of active bleeding.  3. Air in the renal pelvises bilaterally, with dilated distal right ureter and both ureters indistinguishable from the tumor distally. These findings and the presence of air within the tumor may be compatible with fistula as previously questioned.  4. Extensive invasion of the bladder by tumor, not evident previously.    Pt has bilateral nephrostomies due to ureters bilaterally involved with CA.  Pt has a vesicovaginal fistula with vaginal bleeding.  Pt is currently complaining of diarrhea and needs to use the bathroom every 20-30 min, the stool is black in color and positive for occult blood    Pt is also symptomatic, with lightheadedness, weakness. (2020 00:16)      Anemia  Family history of diabetes mellitus in father  Handoff  MEWS Score  Cataract  Personal history of radiation therapy  Malignant neoplasm of endometrium  Anemia  Anemia  Endometrial cancer  Suspected deep vein thrombosis (DVT)  Nephrostomy status  H/O cataract extraction, left  History of hip replacement  History of total bilateral knee replacement  ABDOMINAL PAIN  90+  Endometrial cancer  Rectal bleed  UTI (urinary tract infection)  SysAdmin_VisitLink      SUBJECTIVE: Pt seen     Diet:    Activity:     Fevers: [ ]Yes [ ]NO  Chills: [ ] Yes [ ] NO  SOB:  [ ] YES [ ] NO  Dyspnea: [ ]YES [ ]NO  Chest Discomfort: [ ] YES [ ] NO    Nausea: [ ] YES [ ] NO           Vomiting: [ ] YES [ ] NO  Flatus: [ ] YES [ ] NO             Bowel Movement: [ ] YES [ ] NO  Diarrhea: [ ] YES [ ] NO         Void: [ ]YES [ ]No  Constipation: [ ] YES [ ] NO       Pain (0-10):              Pain Control Adequate: [ ] YES [ ] NO    Divya:    EUGENIAT:      I&O's Detail    2020 07:  -  2020 07:00  --------------------------------------------------------  IN:    Packed Red Blood Cells: 600 mL  Total IN: 600 mL    OUT:    Nephrostomy Tube: 600 mL    Nephrostomy Tube: 350 mL  Total OUT: 950 mL    Total NET: -350 mL      2020 07:  -  2020 18:07  --------------------------------------------------------  IN:  Total IN: 0 mL    OUT:    Nephrostomy Tube: 140 mL    Nephrostomy Tube: 400 mL  Total OUT: 540 mL    Total NET: -540 mL        I&O's Summary    :  -  2020 07:00  --------------------------------------------------------  IN: 600 mL / OUT: 950 mL / NET: -350 mL    :  -  2020 18:07  --------------------------------------------------------  IN: 0 mL / OUT: 540 mL / NET: -540 mL          MEDICATIONS  (STANDING):  cyanocobalamin 1000 MICROGram(s) Oral daily  dronabinol 2.5 milliGRAM(s) Oral two times a day  gabapentin Oral Tab/Cap - Peds 300 milliGRAM(s) Oral two times a day  pyridoxine 100 milliGRAM(s) Oral daily    MEDICATIONS  (PRN):  acetaminophen   Tablet .. 650 milliGRAM(s) Oral every 6 hours PRN Mild Pain (1 - 3)  morphine  - Injectable 2 milliGRAM(s) IV Push every 4 hours PRN Moderate Pain (4 - 6)  morphine  - Injectable 4 milliGRAM(s) IV Push every 4 hours PRN Severe Pain (7 - 10)      LABS:                        10.4   12.14 )-----------( 281      ( 2020 16:21 )             31.6     07-30    136  |  97<L>  |  12.0  ----------------------------<  91  4.4   |  24.0  |  0.81    Ca    8.5<L>      2020 07:36    TPro  7.0  /  Alb  2.8<L>  /  TBili  0.3<L>  /  DBili  x   /  AST  17  /  ALT  10  /  AlkPhos  84  07-    PT/INR - ( 2020 18:45 )   PT: 15.4 sec;   INR: 1.35 ratio         PTT - ( 2020 18:45 )  PTT:31.0 sec  Urinalysis Basic - ( 2020 18:54 )    Color: Yellow / Appearance: Slightly Turbid / S.010 / pH: x  Gluc: x / Ketone: Negative  / Bili: Negative / Urobili: Negative   Blood: x / Protein: 100 / Nitrite: Positive   Leuk Esterase: Moderate / RBC: 0-2 /HPF / WBC 11-25   Sq Epi: x / Non Sq Epi: Occasional / Bacteria: Moderate          RADIOLOGY & ADDITIONAL STUDIES:       EXAM:  CT ANGIO ABD PELV (W)AW IC                          PROCEDURE DATE:  2020          INTERPRETATION:  CLINICAL INFORMATION:  abd pain, lower GI bleeding, vaginal bleeding, history of endometrial cancer with fistula from the left ureter to the vaginal cuff  COMPARISON: 2020  PROCEDURE:  CT of the Abdomen and Pelvis was performed with and without intravenous contrast.  Precontrast, Arterial and Delayed phases were performed.  Intravenous contrast: 90 ml Omnipaque 350. 10 ml discarded.  Oral contrast: None.  Sagittal and coronal reformats were performed.    FINDINGS:  LIMITATIONS: Beam hardening artifact from the presence of the patient's arms within the field of the beam. There is also oral contrast in the left side of the colon, limiting ability to evaluate for active bleeding.    LOWER CHEST: Within normal limits.  ABDOMINAL WALL: Within normal limits.  VESSELS: Within normal limits.  BOWEL: No evidence of obstruction. Normal distal esophagus, stomach and duodenum. Thereis invasion of the rectum by the pelvic tumor with wall thickening of the rectum. Immediately afferent to the tumor, there is mildly dilated colon which contains oral contrast. The contrast does not pass the tumor. Appendix appears to have been removed. There may be a small focus of bleeding at the tumor site on the arterial phase sequence (series 10, image 117) that is not seen on the noncontrast study.    LIVER: Within normal limits.  BILE DUCTS: Normal caliber  GALLBLADDER: Distended  SPLEEN: Within normal limits.  PANCREAS: Within normal limits.  ADRENALS: Left adrenal nodule measuring 1.9 cm, not clearly benign.  KIDNEYS/URETERS: Bilateral percutaneous nephrostomies with the tips coiled in the renal pelvises bilaterally. Small amount of air in the renal pelvises bilaterally. No hydronephrosis. The distal right ureter is dilated and indistinguishable from the pelvic mass. The distal left ureter is indistinguishable from the pelvic mass.    BLADDER: There is extensive bladder invasion by tumor.  REPRODUCTIVE ORGANS: Absent uterus. In the uterine bed, there is a lobulated, necrotic mass with areas of air extensive adjacent inflammatory changes measuring approximately 10.0 x 8.9 x 13.8 cm. Mass previously measured 9.5 x 6.8 x 5.7 cm.    PERITONEUM: No ascites.  RETROPERITONEUM/LYMPH NODES: Mild lymphadenopathy, extending along the left iliac chain.    BONES: No acute osseous pathology. Right hip replacement.    IMPRESSION:  1. Oral contrast on the left-sided colon limits the ability to evaluate for active bleeding.  2. Large pelvic tumor, significantly increased in size. The tumor appears to invade and obstruct the rectosigmoid colon. At the site of invasion, there may be a small amount of active bleeding.  3. Air in the renal pelvises bilaterally, with dilated distal right ureter and both ureters indistinguishable from the tumor distally. These findings and the presence of air within the tumor may be compatible with fistula as previously questioned.  4. Extensive invasion of the bladder by tumor, not evident previously.              ELIDA THOMAS   This document has been electronically signed. 2020 11:02PM SURGICAL PA NOTE: Urology consult note    CTSP for obstructed distal ureters/bladder invasion from metastatic endometrial ca    STATUS POST:      POST OPERATIVE DAY #:     Vital Signs Last 24 Hrs  T(C): 37.3 (2020 16:25), Max: 37.6 (2020 07:45)  T(F): 99.2 (2020 16:25), Max: 99.7 (2020 07:45)  HR: 113 (2020 16:25) (105 - 123)  BP: 132/85 (2020 16:25) (107/70 - 132/85)  BP(mean): --  RR: 20 (2020 16:25) (18 - 20)  SpO2: 97% (2020 16:25) (97% - 100%)    HPI:  SERA JOVEL is a 68yo F who was diagnosed with endometrial cancer at age 65.  She was initially diagnosed with a grade 1 endometrial cancer in  at [facility]. At that time she was seen by Dr. Givens at [place]. She evidently was determined not to be a candidate for an abdominal surgery and a vaginal hysterectomy was recommended. She was taken to the OR for a VH, however, the procedure was aborted due to the "uterus being glued to the colon". She was then treated with whole pelvic RT 5040 cGY completed 10/15/15. It does not appear that she received any brachytherapy.     In 2017 she again presented with heavy vaginal bleeding. A mass was noted to be protruding from the vagina and was biopsied showing G1 endometrioid adenocarcinoma. Screening for Encarnacion syndrome with MSI showed loss of expression of MLH1 and PMS2, however MLH promotor methylation was present suggesting a sporadic tumor. She was started on an aromatase inhibitor and referred to Dr. Pascale London for surgical consultation. She underwent robotic laparoscopic hysterectomy and bilateral salpingo-oophorectomy on 10/12/2017.    Vaginal apex recurrence s/p biopsy on 18 was positive for endometrial adenocarcinoma, c/w a recurrent endometrioid type adenocarcinoma, diffusely ER positive (almost 100%), and MN positive (>90%).    Treatment summary:  2018 - 3/2018 trial of Megace + tamoxifen alternating - developed increased vaginal spotting with Tamoxifen - stopped both meds.   18 - 18 6 cycles of carboplatin + taxol (q 3 week).  18 - completed vaginal cuff mass SBRT.   04/10/19 - PET with persistent avidity at vaginal cuff and activity suspicious for mets to lymph nodes.   19 - 2019 Pembrolizumab (Keytruda)  10/28/19 - 20 Doxorubicin Liposomal.     Pt had a CT scan on  with report of pelvic mass inseparable from the vaginal cuff and posterior aspect of the bladder and anterior wall of the rectum.  The CT cystogram was aborted due to inability to place Stokes catheter.  the CT from today inpatient:  < from: CT Angio Abdomen and Pelvis w/ IV Cont (20 @ 22:18) >  1. Oral contrast on the left-sided colon limits the ability to evaluate for active bleeding.  2. Large pelvic tumor, significantly increased in size. The tumor appears to invade and obstruct the rectosigmoid colon. At the site of invasion, there may be a small amount of active bleeding.  3. Air in the renal pelvises bilaterally, with dilated distal right ureter and both ureters indistinguishable from the tumor distally. These findings and the presence of air within the tumor may be compatible with fistula as previously questioned.  4. Extensive invasion of the bladder by tumor, not evident previously.    Pt has bilateral nephrostomies due to ureters bilaterally involved with CA.  Pt has a vesicovaginal fistula with vaginal bleeding.  Pt is currently complaining of diarrhea and needs to use the bathroom every 20-30 min, the stool is black in color and positive for occult blood    Pt is also symptomatic, with lightheadedness, weakness. (2020 00:16)      Anemia  Family history of diabetes mellitus in father  Handoff  MEWS Score  Cataract  Personal history of radiation therapy  Malignant neoplasm of endometrium  Anemia  Anemia  Endometrial cancer  Suspected deep vein thrombosis (DVT)  Nephrostomy status  H/O cataract extraction, left  History of hip replacement  History of total bilateral knee replacement  ABDOMINAL PAIN  90+  Endometrial cancer  Rectal bleed  UTI (urinary tract infection)  SysAdmin_VisitLink      SUBJECTIVE: Pt seen at bedside with Dr Roberts, son of patient at bedside for translation    Diet:    Activity:     Fevers: [ ]Yes [ ]NO  Chills: [ ] Yes [ ] NO  SOB:  [ ] YES [ ] NO  Dyspnea: [ ]YES [ ]NO  Chest Discomfort: [ ] YES [ ] NO    Nausea: [ ] YES [ ] NO           Vomiting: [ ] YES [ ] NO  Flatus: [ ] YES [ ] NO             Bowel Movement: [ ] YES [ ] NO  Diarrhea: [ ] YES [ ] NO         Void: [ ]YES [ ]No  Constipation: [ ] YES [ ] NO       Pain (0-10):              Pain Control Adequate: [ ] YES [ ] NO    Divya:    EUGENIAT:      I&O's Detail    2020 07:  -  2020 07:00  --------------------------------------------------------  IN:    Packed Red Blood Cells: 600 mL  Total IN: 600 mL    OUT:    Nephrostomy Tube: 600 mL    Nephrostomy Tube: 350 mL  Total OUT: 950 mL    Total NET: -350 mL        -  2020 18:07  --------------------------------------------------------  IN:  Total IN: 0 mL    OUT:    Nephrostomy Tube: 140 mL    Nephrostomy Tube: 400 mL  Total OUT: 540 mL    Total NET: -540 mL        I&O's Summary    :  -  2020 07:00  --------------------------------------------------------  IN: 600 mL / OUT: 950 mL / NET: -350 mL      -  2020 18:07  --------------------------------------------------------  IN: 0 mL / OUT: 540 mL / NET: -540 mL          MEDICATIONS  (STANDING):  cyanocobalamin 1000 MICROGram(s) Oral daily  dronabinol 2.5 milliGRAM(s) Oral two times a day  gabapentin Oral Tab/Cap - Peds 300 milliGRAM(s) Oral two times a day  pyridoxine 100 milliGRAM(s) Oral daily    MEDICATIONS  (PRN):  acetaminophen   Tablet .. 650 milliGRAM(s) Oral every 6 hours PRN Mild Pain (1 - 3)  morphine  - Injectable 2 milliGRAM(s) IV Push every 4 hours PRN Moderate Pain (4 - 6)  morphine  - Injectable 4 milliGRAM(s) IV Push every 4 hours PRN Severe Pain (7 - 10)      LABS:                        10.4   12.14 )-----------( 281      ( 2020 16:21 )             31.6     07-30    136  |  97<L>  |  12.0  ----------------------------<  91  4.4   |  24.0  |  0.81    Ca    8.5<L>      2020 07:36    TPro  7.0  /  Alb  2.8<L>  /  TBili  0.3<L>  /  DBili  x   /  AST  17  /  ALT  10  /  AlkPhos  84  07-29    PT/INR - ( 2020 18:45 )   PT: 15.4 sec;   INR: 1.35 ratio         PTT - ( 2020 18:45 )  PTT:31.0 sec  Urinalysis Basic - ( 2020 18:54 )    Color: Yellow / Appearance: Slightly Turbid / S.010 / pH: x  Gluc: x / Ketone: Negative  / Bili: Negative / Urobili: Negative   Blood: x / Protein: 100 / Nitrite: Positive   Leuk Esterase: Moderate / RBC: 0-2 /HPF / WBC 11-25   Sq Epi: x / Non Sq Epi: Occasional / Bacteria: Moderate          RADIOLOGY & ADDITIONAL STUDIES:       EXAM:  CT ANGIO ABD PELV (W)AW IC                          PROCEDURE DATE:  2020          INTERPRETATION:  CLINICAL INFORMATION:  abd pain, lower GI bleeding, vaginal bleeding, history of endometrial cancer with fistula from the left ureter to the vaginal cuff  COMPARISON: 2020  PROCEDURE:  CT of the Abdomen and Pelvis was performed with and without intravenous contrast.  Precontrast, Arterial and Delayed phases were performed.  Intravenous contrast: 90 ml Omnipaque 350. 10 ml discarded.  Oral contrast: None.  Sagittal and coronal reformats were performed.    FINDINGS:  LIMITATIONS: Beam hardening artifact from the presence of the patient's arms within the field of the beam. There is also oral contrast in the left side of the colon, limiting ability to evaluate for active bleeding.    LOWER CHEST: Within normal limits.  ABDOMINAL WALL: Within normal limits.  VESSELS: Within normal limits.  BOWEL: No evidence of obstruction. Normal distal esophagus, stomach and duodenum. Thereis invasion of the rectum by the pelvic tumor with wall thickening of the rectum. Immediately afferent to the tumor, there is mildly dilated colon which contains oral contrast. The contrast does not pass the tumor. Appendix appears to have been removed. There may be a small focus of bleeding at the tumor site on the arterial phase sequence (series 10, image 117) that is not seen on the noncontrast study.    LIVER: Within normal limits.  BILE DUCTS: Normal caliber  GALLBLADDER: Distended  SPLEEN: Within normal limits.  PANCREAS: Within normal limits.  ADRENALS: Left adrenal nodule measuring 1.9 cm, not clearly benign.  KIDNEYS/URETERS: Bilateral percutaneous nephrostomies with the tips coiled in the renal pelvises bilaterally. Small amount of air in the renal pelvises bilaterally. No hydronephrosis. The distal right ureter is dilated and indistinguishable from the pelvic mass. The distal left ureter is indistinguishable from the pelvic mass.    BLADDER: There is extensive bladder invasion by tumor.  REPRODUCTIVE ORGANS: Absent uterus. In the uterine bed, there is a lobulated, necrotic mass with areas of air extensive adjacent inflammatory changes measuring approximately 10.0 x 8.9 x 13.8 cm. Mass previously measured 9.5 x 6.8 x 5.7 cm.    PERITONEUM: No ascites.  RETROPERITONEUM/LYMPH NODES: Mild lymphadenopathy, extending along the left iliac chain.    BONES: No acute osseous pathology. Right hip replacement.    IMPRESSION:  1. Oral contrast on the left-sided colon limits the ability to evaluate for active bleeding.  2. Large pelvic tumor, significantly increased in size. The tumor appears to invade and obstruct the rectosigmoid colon. At the site of invasion, there may be a small amount of active bleeding.  3. Air in the renal pelvises bilaterally, with dilated distal right ureter and both ureters indistinguishable from the tumor distally. These findings and the presence of air within the tumor may be compatible with fistula as previously questioned.  4. Extensive invasion of the bladder by tumor, not evident previously.              ELIDA THOMAS   This document has been electronically signed. 2020 11:02PM

## 2020-07-30 NOTE — CONSULT NOTE ADULT - ATTENDING COMMENTS
COUNSELING:    Face to face meeting to discuss Advanced Care Planning - Time Spent _15_____ Minutes.  See goals of care note.    More than 50% time spent in counseling and coordinating care. ___40___ Minutes.     Thank you for the opportunity to assist with the care of this patient.   [facility] Consult [facility] [phone].
Impression:  patient well known to me.  I doubt this is a resectable mass and would likely not allow adequate ureteral length for reconstruction into a loop.  Would recommend neph tubes and change them every three months.    Could flush with normal saline 10 cc daily.

## 2020-07-30 NOTE — CHART NOTE - NSCHARTNOTEFT_GEN_A_CORE
Patient seen and examined at bedside with Dr. Peterson, Dr. Ohara, Ken Rocha PAC-C, and Tasha Anderson MS4. Patient is sitting up, awake and alert. Patient's daughter at bedside. She reports that she still feels some pelvic pressure but it has been better since she received the morphine. They state that the colorectal team came by this AM to see her, but she was being wheeled to radiology for her dopplers at that time. Dopplers without evidence of DVT. The surgery team will return this afternoon. Palliative care consult pending. Post-transfusion CBC pending. Will continue to monitor. Patient seen and examined at bedside with Dr. Peterson, Dr. Ohara, Ken Rocha [facility], and Atsha Antonio MS4. Patient is sitting up, awake and alert. Patient's daughter at bedside. She reports that she still feels some pelvic pressure but it has been better since she received the morphine. They state that the colorectal team came by this AM to see her, but she was being wheeled to radiology for her dopplers at that time. Dopplers without evidence of DVT. The surgery team will return this afternoon. Palliative care consult pending. Post-transfusion CBC pending. Will continue to monitor.      -----  I was physically present for the key portions of the evaluation and management (E/M) services provided.  I agree with the above history, physical, and plan which I have reviewed and edited where appropriate    Gonzales Peterson M.D.

## 2020-07-30 NOTE — CONSULT NOTE ADULT - PROBLEM SELECTOR RECOMMENDATION 2
Hb 6.8 gm/dL  CT:  Large pelvic tumor, significantly increased in size. The tumor appears to invade and obstruct the rectosigmoid colon. At the site of invasion, there may be a small amount of active bleeding.  -colorectal team/gyn team for consideration pall surgery

## 2020-07-30 NOTE — CONSULT NOTE ADULT - SUBJECTIVE AND OBJECTIVE BOX
This is a 68 yo F PMH of Malignant Neoplasm of Endometrial diagnosed in  and treated with Radiation Therapy   HPI:  SERA JOVEL is a 70yo F who was diagnosed with endometrial cancer at age 65.  She was initially diagnosed with a grade 1 endometrial cancer in  at [facility]. At that time she was seen by Dr. Givens at [place]. She evidently was determined not to be a candidate for an abdominal surgery and a vaginal hysterectomy was recommended. She was taken to the OR for a VH, however, the procedure was aborted due to the "uterus being glued to the colon". She was then treated with whole pelvic RT 5040 cGY completed 10/15/15. It does not appear that she received any brachytherapy.     In 2017 she again presented with heavy vaginal bleeding. A mass was noted to be protruding from the vagina and was biopsied showing G1 endometrioid adenocarcinoma. Screening for Encarnacion syndrome with MSI showed loss of expression of MLH1 and PMS2, however MLH promotor methylation was present suggesting a sporadic tumor. She was started on an aromatase inhibitor and referred to Dr. Pascale London for surgical consultation. She underwent robotic laparoscopic hysterectomy and bilateral salpingo-oophorectomy on 10/12/2017.    Vaginal apex recurrence s/p biopsy on 18 was positive for endometrial adenocarcinoma, c/w a recurrent endometrioid type adenocarcinoma, diffusely ER positive (almost 100%), and KS positive (>90%).    Treatment summary:  2018 - 3/2018 trial of Megace + tamoxifen alternating - developed increased vaginal spotting with Tamoxifen - stopped both meds.   18 - 18 6 cycles of carboplatin + taxol (q 3 week).  18 - completed vaginal cuff mass SBRT.   04/10/19 - PET with persistent avidity at vaginal cuff and activity suspicious for mets to lymph nodes.   19 - 2019 Pembrolizumab (Keytruda)  10/28/19 - 20 Doxorubicin Liposomal.     Pt had a CT scan on  with report of pelvic mass inseparable from the vaginal cuff and posterior aspect of the bladder and anterior wall of the rectum.  The CT cystogram was aborted due to inability to place Wheat catheter.  the CT from today inpatient:  < from: CT Angio Abdomen and Pelvis w/ IV Cont (07.29.20 @ 22:18) >  1. Oral contrast on the left-sided colon limits the ability to evaluate for active bleeding.  2. Large pelvic tumor, significantly increased in size. The tumor appears to invade and obstruct the rectosigmoid colon. At the site of invasion, there may be a small amount of active bleeding.  3. Air in the renal pelvises bilaterally, with dilated distal right ureter and both ureters indistinguishable from the tumor distally. These findings and the presence of air within the tumor may be compatible with fistula as previously questioned.  4. Extensive invasion of the bladder by tumor, not evident previously.    < end of copied text >    Pt has bilateral nephrostomies due to ureters bilaterally involved with CA.  Pt has a vesicovaginal fistula with vaginal bleeding.  Pt is currently complaining of diarrhea and needs to use the bathroom every 20-30 min, the stool is black in color and positive for occult blood    Pt is also symptomatic, with lightheadedness, weakness. (2020 00:16)      PERTINENT PMH REVIEWED: Yes No    PAST MEDICAL & SURGICAL HISTORY:  Cataract  Personal history of radiation therapy  Malignant neoplasm of endometrium  Nephrostomy status: bilateral  H/O cataract extraction, left:   History of hip replacement: 2013  History of total bilateral knee replacement: right in , left in       SOCIAL HISTORY:  EtOH Yes   No                                    Drugs  Yes  No                                    smoker  nonsmoker                                    Admitted from: home  SNF _________ [facility] ________Surrogate/HCP/Guardian: Phone#:    FAMILY HISTORY:  Family history of diabetes mellitus in father      Allergies    amoxicillin (Rash)    Intolerances        Baseline ADLs (prior to admission):  Independent/ Dependent      Present Symptoms:     Dyspnea: 0 1 2 3   Nausea/Vomiting: Yes No  Anxiety:  Yes No  Depression: Yes No  Fatigue: Yes No  Loss of appetite: Yes No    Pain:             Character-            Duration-            Effect-            Factors-            Frequency-            Location-            Severity-    Review of Systems: Reviewed                     Negative:                     Positive:  Unable to obtain due to poor mentation   All others negative    MEDICATIONS  (STANDING):  cyanocobalamin 1000 MICROGram(s) Oral daily  dronabinol 2.5 milliGRAM(s) Oral two times a day  gabapentin Oral Tab/Cap - Peds 300 milliGRAM(s) Oral two times a day  pyridoxine 100 milliGRAM(s) Oral daily    MEDICATIONS  (PRN):  acetaminophen   Tablet .. 650 milliGRAM(s) Oral every 6 hours PRN Mild Pain (1 - 3)  morphine  - Injectable 2 milliGRAM(s) IV Push every 4 hours PRN Moderate Pain (4 - 6)  morphine  - Injectable 4 milliGRAM(s) IV Push every 4 hours PRN Severe Pain (7 - 10)      PHYSICAL EXAM:    Vital Signs Last 24 Hrs  T(C): 37.3 (2020 15:12), Max: 37.6 (2020 07:45)  T(F): 99.1 (2020 15:12), Max: 99.7 (2020 07:45)  HR: 105 (2020 15:12) (99 - 135)  BP: 115/79 (2020 15:12) (107/70 - 130/82)  BP(mean): --  RR: 18 (2020 07:45) (18 - 20)  SpO2: 98% (2020 15:12) (97% - 100%)    General: alert  oriented x ____ lethargic agitated                  cachexia  nonverbal  coma    Karnofsky:  %    HEENT: normal  dry mouth  ET tube/trach    Lungs: comfortable tachypnea/labored breathing  excessive secretions    CV: normal  tachycardia    GI: normal  distended  tender  no BS               PEG/NG/OG tube  constipation  last BM:     : normal  incontinent  oliguria/anuria  wheat    MSK: normal  weakness  edema             ambulatory  bedbound/wheelchair bound    Skin: normal  pressure ulcers- Stage_____  no rash    LABS:                        9.5    12.84 )-----------( 303      ( 2020 07:36 )             28.6     07-30    136  |  97<L>  |  12.0  ----------------------------<  91  4.4   |  24.0  |  0.81    Ca    8.5<L>      2020 07:36    TPro  7.0  /  Alb  2.8<L>  /  TBili  0.3<L>  /  DBili  x   /  AST  17  /  ALT  10  /  AlkPhos  84  07-29    PT/INR - ( 2020 18:45 )   PT: 15.4 sec;   INR: 1.35 ratio         PTT - ( 2020 18:45 )  PTT:31.0 sec  Urinalysis Basic - ( 2020 18:54 )    Color: Yellow / Appearance: Slightly Turbid / S.010 / pH: x  Gluc: x / Ketone: Negative  / Bili: Negative / Urobili: Negative   Blood: x / Protein: 100 / Nitrite: Positive   Leuk Esterase: Moderate / RBC: 0-2 /HPF / WBC 11-25   Sq Epi: x / Non Sq Epi: Occasional / Bacteria: Moderate      I&O's Summary    2020 07:  -  2020 07:00  --------------------------------------------------------  IN: 600 mL / OUT: 950 mL / NET: -350 mL    2020 07:  -  2020 15:45  --------------------------------------------------------  IN: 0 mL / OUT: 540 mL / NET: -540 mL        RADIOLOGY & ADDITIONAL STUDIES:    ADVANCE DIRECTIVES:   DNR YES NO  Completed on:                     MOLST  YES NO   Completed on:  Living Will  YES NO   Completed on:      COUNSELING:    Face to face meeting to discuss Advanced Care Planning - Time Spent ______ Minutes.  See goals of care note.    More than 50% time spent in counseling and coordinating care. ______ Minutes.     Thank you for the opportunity to assist with the care of this patient.   [facility] [phone]. This is a 70 yo F PMH of Malignant Neoplasm of Endometrial diagnosed in  and treated with Radiation Therapy and most recently last chemotherapy 2020.  She has experienced occasional Nausea, which she denies at present time. Appetite poor - Marinol 2.5mg 2x/d   She presented to ED very weak and SOB, passing frequent  black stool diarrhea, severely anemic from acute blood loss- S/P Transfused 3 units blood. She had been C/O   severe constant lower pelvic pressure and pain; given Morphine Sulfate IVP, which effectively relieved her pain. Patient has +UTI treating with Meropenum-B/L Nephrostomy tubes  in situ with drainage bags.. PMH of Chronic Neuropathy Pain managed with Gabapentin    HPI:  SERA PAIGE is a 68yo F who was diagnosed with endometrial cancer at age 65.  She was initially diagnosed with a grade 1 endometrial cancer in  at [facility]. At that time she was seen by Dr. Givens at [place]. She evidently was determined not to be a candidate for an abdominal surgery and a vaginal hysterectomy was recommended. She was taken to the OR for a VH, however, the procedure was aborted due to the "uterus being glued to the colon". She was then treated with whole pelvic RT 5040 cGY completed 10/15/15. It does not appear that she received any brachytherapy.     In 2017 she again presented with heavy vaginal bleeding. A mass was noted to be protruding from the vagina and was biopsied showing G1 endometrioid adenocarcinoma. Screening for Encarnacion syndrome with MSI showed loss of expression of MLH1 and PMS2, however MLH promotor methylation was present suggesting a sporadic tumor. She was started on an aromatase inhibitor and referred to Dr. Pascale London for surgical consultation. She underwent robotic laparoscopic hysterectomy and bilateral salpingo-oophorectomy on 10/12/2017.    Vaginal apex recurrence s/p biopsy on 18 was positive for endometrial adenocarcinoma, c/w a recurrent endometrioid type adenocarcinoma, diffusely ER positive (almost 100%), and MD positive (>90%).    Treatment summary:  2018 - 3/2018 trial of Megace + tamoxifen alternating - developed increased vaginal spotting with Tamoxifen - stopped both meds.   18 - 18 6 cycles of carboplatin + taxol (q 3 week).  18 - completed vaginal cuff mass SBRT.   04/10/19 - PET with persistent avidity at vaginal cuff and activity suspicious for mets to lymph nodes.   19 - 2019 Pembrolizumab (Keytruda)  10/28/19 - 20 Doxorubicin Liposomal.     Pt had a CT scan on  with report of pelvic mass inseparable from the vaginal cuff and posterior aspect of the bladder and anterior wall of the rectum.  The CT cystogram was aborted due to inability to place Wheat catheter.  the CT from today inpatient:  < from: CT Angio Abdomen and Pelvis w/ IV Cont (20 @ 22:18) >  1. Oral contrast on the left-sided colon limits the ability to evaluate for active bleeding.  2. Large pelvic tumor, significantly increased in size. The tumor appears to invade and obstruct the rectosigmoid colon. At the site of invasion, there may be a small amount of active bleeding.  3. Air in the renal pelvises bilaterally, with dilated distal right ureter and both ureters indistinguishable from the tumor distally. These findings and the presence of air within the tumor may be compatible with fistula as previously questioned.  4. Extensive invasion of the bladder by tumor, not evident previously.    < end of copied text >    Pt has bilateral nephrostomies due to ureters bilaterally involved with CA.  Pt has a vesicovaginal fistula with vaginal bleeding.  Pt is currently complaining of diarrhea and needs to use the bathroom every 20-30 min, the stool is black in color and positive for occult blood    Pt is also symptomatic, with lightheadedness, weakness. (2020 00:16)      PERTINENT PMH REVIEWED: Yes     PAST MEDICAL & SURGICAL HISTORY:  Cataract  Personal history of radiation therapy  Malignant neoplasm of endometrium  Nephrostomy status: bilateral  H/O cataract extraction, left:   History of hip replacement: 2013  History of total bilateral knee replacement: right in , left in       SOCIAL HISTORY:  EtOH No                                    Drugs    No                                   nonsmoker                                    Admitted from: home  HCP daughter Leonarda Paige [phone]  FAMILY HISTORY:  Family history of diabetes mellitus in father    Allergies  amoxicillin (Rash)    Baseline ADLs (prior to admission):   Dependent      Present Symptoms:   Dyspnea:  2    Nausea/Vomiting: No  Anxiety:  Yes   Depression: Yes   Fatigue: Yes   Loss of appetite: Yes     Pain: Lower pelvic pain and pressure moderate to severe            Character-            Duration-            Effect-            Factors-            Frequency-            Location-BLLE Neuropathy from chemotherapy            Severity-moderate neuropathy    Review of Systems: Reviewed                       All others negative    MEDICATIONS  (STANDING):  cyanocobalamin 1000 MICROGram(s) Oral daily  dronabinol 2.5 milliGRAM(s) Oral two times a day  gabapentin Oral Tab/Cap - Peds 300 milliGRAM(s) Oral two times a day  pyridoxine 100 milliGRAM(s) Oral daily    MEDICATIONS  (PRN):  acetaminophen   Tablet .. 650 milliGRAM(s) Oral every 6 hours PRN Mild Pain (1 - 3)  morphine  - Injectable 2 milliGRAM(s) IV Push every 4 hours PRN Moderate Pain (4 - 6)  morphine  - Injectable 4 milliGRAM(s) IV Push every 4 hours PRN Severe Pain (7 - 10)    PHYSICAL EXAM:    Vital Signs Last 24 Hrs  T(C): 37.3 (2020 15:12), Max: 37.6 (2020 07:45)  T(F): 99.1 (2020 15:12), Max: 99.7 (2020 07:45)  HR: 105 (2020 15:12) (99 - 135)  BP: 115/79 (2020 15:12) (107/70 - 130/82)  BP(mean): --  RR: 18 (2020 07:45) (18 - 20)  SpO2: 98% (2020 15:12) (97% - 100%)    General: alert  oriented x _3___ weak and sleepy   agreed to answer simple questions Patient wants to go home for [place] to be with her family    HEENT:  dry mouth     Lungs: comfortable     CV:   tachycardia    GI:  distended               constipation  last BM: ?    : normal  incontinent  oliguria/anuria  wheat    MSK:  weakness               ambulatory  bedbound/wheelchair bound    Skin: normal  pressure ulcers- Stage_____  no rash    LABS:                        9.5    12.84 )-----------( 303      ( 2020 07:36 )             28.6     07-30    136  |  97<L>  |  12.0  ----------------------------<  91  4.4   |  24.0  |  0.81    Ca    8.5<L>      2020 07:36    TPro  7.0  /  Alb  2.8<L>  /  TBili  0.3<L>  /  DBili  x   /  AST  17  /  ALT  10  /  AlkPhos  84  07-29    PT/INR - ( 2020 18:45 )   PT: 15.4 sec;   INR: 1.35 ratio         PTT - ( 2020 18:45 )  PTT:31.0 sec  Urinalysis Basic - ( 2020 18:54 )    Color: Yellow / Appearance: Slightly Turbid / S.010 / pH: x  Gluc: x / Ketone: Negative  / Bili: Negative / Urobili: Negative   Blood: x / Protein: 100 / Nitrite: Positive   Leuk Esterase: Moderate / RBC: 0-2 /HPF / WBC 11-25   Sq Epi: x / Non Sq Epi: Occasional / Bacteria: Moderate    I&O's Summary    2020 07:  -  2020 07:00  --------------------------------------------------------  IN: 600 mL / OUT: 950 mL / NET: -350 mL    2020 07:  -  2020 15:45  --------------------------------------------------------  IN: 0 mL / OUT: 540 mL / NET: -540 mL    RADIOLOGY & ADDITIONAL STUDIES:    ADVANCE DIRECTIVES:   DNR  NO  Completed on:                     BLADIMIR   NO   Completed on:  Living Will   NO   Completed on:

## 2020-07-30 NOTE — CONSULT NOTE ADULT - SUBJECTIVE AND OBJECTIVE BOX
HPI:  SERA JOVEL is a 68yo F who was diagnosed with endometrial cancer at age 65.  She was initially diagnosed with a grade 1 endometrial cancer in  at [facility]. At that time she was seen by Dr. Givens at [place]. She evidently was determined not to be a candidate for an abdominal surgery and a vaginal hysterectomy was recommended. She was taken to the OR for a VH, however, the procedure was aborted due to the "uterus being glued to the colon". She was then treated with whole pelvic RT 5040 cGY completed 10/15/15. It does not appear that she received any brachytherapy.     In 2017 she again presented with heavy vaginal bleeding. A mass was noted to be protruding from the vagina and was biopsied showing G1 endometrioid adenocarcinoma. Screening for Encarnacion syndrome with MSI showed loss of expression of MLH1 and PMS2, however MLH promotor methylation was present suggesting a sporadic tumor. She was started on an aromatase inhibitor and referred to Dr. Pascale London for surgical consultation. She underwent robotic laparoscopic hysterectomy and bilateral salpingo-oophorectomy on 10/12/2017.    Vaginal apex recurrence s/p biopsy on 18 was positive for endometrial adenocarcinoma, c/w a recurrent endometrioid type adenocarcinoma, diffusely ER positive (almost 100%), and AL positive (>90%).    Treatment summary:  2018 - 3/2018 trial of Megace + tamoxifen alternating - developed increased vaginal spotting with Tamoxifen - stopped both meds.   18 - 18 6 cycles of carboplatin + taxol (q 3 week).  18 - completed vaginal cuff mass SBRT.   04/10/19 - PET with persistent avidity at vaginal cuff and activity suspicious for mets to lymph nodes.   19 - 2019 Pembrolizumab (Keytruda)  10/28/19 - 20 Doxorubicin Liposomal.     Pt had a CT scan on  with report of pelvic mass inseparable from the vaginal cuff and posterior aspect of the bladder and anterior wall of the rectum.  The CT cystogram was aborted due to inability to place Stokes catheter.  the CT from today inpatient:  < from: CT Angio Abdomen and Pelvis w/ IV Cont (20 @ 22:18) >  1. Oral contrast on the left-sided colon limits the ability to evaluate for active bleeding.  2. Large pelvic tumor, significantly increased in size. The tumor appears to invade and obstruct the rectosigmoid colon. At the site of invasion, there may be a small amount of active bleeding.  3. Air in the renal pelvises bilaterally, with dilated distal right ureter and both ureters indistinguishable from the tumor distally. These findings and the presence of air within the tumor may be compatible with fistula as previously questioned.  4. Extensive invasion of the bladder by tumor, not evident previously.    < end of copied text >    Pt has bilateral nephrostomies due to ureters bilaterally involved with CA.  Pt has a vesicovaginal fistula with vaginal bleeding.  Pt is currently complaining of diarrhea and needs to use the bathroom every 20-30 min, the stool is black in color and positive for occult blood    Pt is also symptomatic, with lightheadedness, weakness. (2020 00:16)      PAST MEDICAL & SURGICAL HISTORY:  Cataract  Personal history of radiation therapy  Malignant neoplasm of endometrium  Nephrostomy status: bilateral  H/O cataract extraction, left:   History of hip replacement: 2013  History of total bilateral knee replacement: right in , left in       MEDICATIONS  (STANDING):  cyanocobalamin 1000 MICROGram(s) Oral daily  dronabinol 2.5 milliGRAM(s) Oral two times a day  gabapentin Oral Tab/Cap - Peds 300 milliGRAM(s) Oral two times a day  pyridoxine 100 milliGRAM(s) Oral daily    MEDICATIONS  (PRN):  morphine  - Injectable 2 milliGRAM(s) IV Push every 4 hours PRN Moderate Pain (4 - 6)  morphine  - Injectable 4 milliGRAM(s) IV Push every 4 hours PRN Severe Pain (7 - 10)      Allergies    amoxicillin (Rash)    Intolerances        FAMILY HISTORY:  Family history of diabetes mellitus in father      Review of Systems    Constitutional, Eyes, ENT, Cardiovascular, Respiratory, Gastrointestinal, Genitourinary, Musculoskeletal, Integumentary, Neurological, Psychiatric, Endocrine, Heme/Lymph and Allergic/Immunologic review of systems are otherwise negative except as noted in HPI.     Vital Signs Last 24 Hrs  T(C): 36.8 (2020 23:05), Max: 37.2 (2020 17:28)  T(F): 98.3 (2020 23:05), Max: 99 (2020 17:28)  HR: 120 (2020 23:05) (99 - 135)  BP: 125/66 (2020 23:05) (119/84 - 129/72)  BP(mean): --  RR: 18 (2020 23:05) (18 - 20)  SpO2: 100% (2020 23:05) (99% - 100%)    Physical Exam  Constitutional: well developed, well nourished, in no acute distress and thin.   Eyes: PERRL, EOMI, no conjunctival infection, anicteric.   ENT: pharynx is unremarkable, moist mucus membrane, no oral lesions.   Neck: supple without JVD, no thyromegaly or masses appreciated.   Pulmonary: clear to auscultation bilaterally, no dullness, no wheezing.   Cardiac: RRR, normal S1S2, no murmurs, rubs, gallops.   Vascular: no JVD, no calf tenderness, venous stasis changes, varices.   Abdomen: normoactive bowel sounds, soft and nontender, no hepatosplenomegaly or masses appreciated.   Lymphatic: no peripheral adenopathy appreciated.   Musculoskeletal: full range of motion and no deformities appreciated.   Skin: normal appearance, no rash, nodules, vesicles, ulcers, erythema.   Neurology: grossly intact.   Psychiatric: affect appropriate.       LABS:  CBC Full  -  ( 2020 18:45 )  WBC Count : 11.51 K/uL  RBC Count : 2.15 M/uL  Hemoglobin : 6.8 g/dL  Hematocrit : 21.5 %  Platelet Count - Automated : 336 K/uL  Mean Cell Volume : 100.0 fl  Mean Cell Hemoglobin : 31.6 pg  Mean Cell Hemoglobin Concentration : 31.6 gm/dL  Auto Neutrophil # : 7.54 K/uL  Auto Lymphocyte # : 2.65 K/uL  Auto Monocyte # : 0.92 K/uL  Auto Eosinophil # : 0.30 K/uL  Auto Basophil # : 0.10 K/uL  Auto Neutrophil % : 65.5 %  Auto Lymphocyte % : 23.0 %  Auto Monocyte % : 8.0 %  Auto Eosinophil % : 2.6 %  Auto Basophil % : 0.9 %        129<L>  |  93<L>  |  14.0  ----------------------------<  100<H>  4.0   |  23.0  |  1.01    Ca    8.3<L>      2020 18:45    TPro  7.0  /  Alb  2.8<L>  /  TBili  0.3<L>  /  DBili  x   /  AST  17  /  ALT  10  /  AlkPhos  84  07-29    PT/INR - ( 2020 18:45 )   PT: 15.4 sec;   INR: 1.35 ratio         PTT - ( 2020 18:45 )  PTT:31.0 sec  Urinalysis Basic - ( 2020 18:54 )    Color: Yellow / Appearance: Slightly Turbid / S.010 / pH: x  Gluc: x / Ketone: Negative  / Bili: Negative / Urobili: Negative   Blood: x / Protein: 100 / Nitrite: Positive   Leuk Esterase: Moderate / RBC: 0-2 /HPF / WBC 11-25   Sq Epi: x / Non Sq Epi: Occasional / Bacteria: Moderate        RADIOLOGY & ADDITIONAL STUDIES:

## 2020-07-30 NOTE — CHART NOTE - NSCHARTNOTEFT_GEN_A_CORE
Pt was seen at bedside, resting comfortably in the bed, receiving her second unit of blood. received 2 doses of IV push Morphine for pain control. The son is at bedside concerned about her prognosis and wellbeing. Asking questions regarding the prognosis and extent of tumor has been recently told about the extent of the mass into bladder, very concerned and asking if the mass can be safely removed entirely.

## 2020-07-30 NOTE — CONSULT NOTE ADULT - CONSULT REASON
Anemia
Endometrial CA invading rectosigmoid
Recurrent Endometrial Cancer
obstructed distal ureters/bladder invasion from metastatic endometrial ca

## 2020-07-30 NOTE — H&P ADULT - NSHPPHYSICALEXAM_GEN_ALL_CORE
Pt is in mild distress with pelvic pain, A&Ox4.  Overall ECOG 2 recently ECOG 3 due to increased pelvic pain and pressure, requiring more pain medication  Pt tachycardic on admission, likely secondary to pain, also possible from UTI.  Afebrile  Heart: synus tachycardia, no murmurs identified,  Lungs: CTA BL  Abd: pain on palpation in pelvic region, hard in that region. Otherwise soft with positive bowel sounds.  LE: no signs of DVT bilaterally  Back: nephrostomy tubes bilaterally draining clear urine.  VE: external genitalia normal appearing with serosanguinous discharge identified. Pt in a diaper

## 2020-07-30 NOTE — CONSULT NOTE ADULT - ASSESSMENT
Patient is a 70yo F with endometrial CA since 2015 and recurrence in 2017 and 2018, with ureteral involvement requiring bilateral nephrostomy tube, on chemotherapy since 2018, presenting with vaginal bleeding and black BM on admission symptomatic anemia of Hg of 6.8 now 9.5 after 3U of PRBCs and CT findings of mass invading rectosigmoid colon. Spoke with family about possible surgery and possibility of colostomy and started having GOC discussion      -Due to the extended on the endometrial mass and ureteral involvement requiring nephrostomy tube, complete resection would be very unlike thus from a colorectal stand point a diverting colostomy as palliation can be offered if patient agreeable.
Per Abd CT:     IMPRESSION:  1. Oral contrast on the left-sided colon limits the ability to evaluate for active bleeding.  2. Large pelvic tumor, significantly increased in size. The tumor appears to invade and obstruct the rectosigmoid colon. At the site of invasion, there may be a small amount of active bleeding.  3. Air in the renal pelvises bilaterally, with dilated distal right ureter and both ureters indistinguishable from the tumor distally. These findings and the presence of air within the tumor may be compatible with fistula as previously questioned.  4. Extensive invasion of the bladder by tumor, not evident previously.    Pt with chronic indwelling bilateral Nephrostomy tubes
70 yo F PMH of Recurrent Endometrial Cancer with Acute blood loss Anemia from large Pelvic Tumor    Anemia  Thought to be Rectal Bleed and from Vesico Vaginal Fistula  H/H 6.8/21.5 daily labs--- Post transfusion: 9.5/28.6  Transfused 3 Units of Blood  Vaginal Pads to be weighed- to track blood loss  Nasal O2 at 2L for SOB due to Anemia    Endometrial Cancer  Large Pelvic Mass causing Vesico Vaginal  Pressure of Pelvic Tumor causing rectal pain and increased bleeding  Diarrhea has subsided today-do not allow Patient to become constipated  Colorectal consulted-rec bowel resection and colostomy  B/L Nephrostomy tubes to drainage bags insitu  (-) DVT  Stimulate appetite with Marinol 2x/d    Abdominal lower Pelvic Pain  Lower pelvic pressure, very uncomfortable  Began bleeding before  Morphine 2-4mg IV Q4 prn moderate to severe pain  Would recommend Oxycodone 5mg Q4 prn mild to moderate pain as long as she is tolerating oral meds  Avoid constipation dulcolax fleets otherwise    Recurrent UTI  Meropenum IV   Hydrate-encourage frequent toileting    GOC  As of present time, plan is to continue chemotherapy next appt Aug 4th-If medically stable for treatment  Spoke with daughter in ED, she is most anxious to speak with someone from Oncology group to discuss, results and  surgery being offered   I spoke with Dr. Thompson this afternoon about this case, she advised me to send Email to both herself and Dr Stanton requesting a call back to daughter ASAP  Will meet with family once all information is shared and understood.  Code status was not discussed-  FULL CODE at this time

## 2020-07-30 NOTE — CONSULT NOTE ADULT - SUBJECTIVE AND OBJECTIVE BOX
COLORECTAL SURGERY CONSULT    HPI: Patient is a 68yo F with significant hx of Endometrial CA since  and recurred 2017 and 2018, presenting with chief complaints of vaginal bleeding, patient's daughter at bedside. Patient mentions she has been experiencing pelvic pressure for the last couple of weeks and yesterday started having vaginal bleeding. Per daughter patient hasn't had vaginal bleeding since 2018 when she started chemotherapy. Patient has also been having black bowel movements for 2-3 week. Daughter mentions patient has been active, able to do ADLs but for the last week was less active and more tired. Patient has had radiotherapy and currently on chemotherapy, has robotic hysterectomy with BSO, recurrence complicated with ureteral involvement requiring bilateral nephrostomies and vesicovaginal fistula.       PAST MEDICAL HISTORY:  Cataract  Personal history of radiation therapy  Malignant neoplasm of endometrium      PAST SURGICAL HISTORY:  Nephrostomy status  H/O cataract extraction, left  History of hip replacement  History of total bilateral knee replacement      ALLERGIES:  amoxicillin (Rash)        MEDICATIONS  (STANDING):  cyanocobalamin 1000 MICROGram(s) Oral daily  dronabinol 2.5 milliGRAM(s) Oral two times a day  gabapentin Oral Tab/Cap - Peds 300 milliGRAM(s) Oral two times a day  pyridoxine 100 milliGRAM(s) Oral daily    MEDICATIONS  (PRN):  acetaminophen   Tablet .. 650 milliGRAM(s) Oral every 6 hours PRN Mild Pain (1 - 3)  morphine  - Injectable 2 milliGRAM(s) IV Push every 4 hours PRN Moderate Pain (4 - 6)  morphine  - Injectable 4 milliGRAM(s) IV Push every 4 hours PRN Severe Pain (7 - 10)      VITALS & I/Os:  Vital Signs Last 24 Hrs  T(C): 37.3 (2020 16:25), Max: 37.6 (2020 07:45)  T(F): 99.2 (2020 16:25), Max: 99.7 (2020 07:45)  HR: 113 (2020 16:25) (105 - 123)  BP: 132/85 (2020 16:25) (107/70 - 132/85)  BP(mean): --  RR: 20 (2020 16:25) (18 - 20)  SpO2: 97% (2020 16:25) (97% - 100%)  CAPILLARY BLOOD GLUCOSE          I&O's Summary    2020 07:01  -  2020 07:00  --------------------------------------------------------  IN: 600 mL / OUT: 950 mL / NET: -350 mL    2020 07:  -  2020 19:38  --------------------------------------------------------  IN: 0 mL / OUT: 540 mL / NET: -540 mL        GENERAL: Alert, morbid obese, in mild distress  MENTAL STATUS: AAOx3. Appropriate affect.  HEENT: PERRLA. EOMI. MMM.  Trachea midline. No lymph node swelling or tenderness.  RESPIRATORY: CTAB. No wheezing, rales or rhonchi.  CARDIOVASCULAR: RRR. No audible murmurs, rubs or gallops.   GASTROINTESTINAL: Abdomen non-distended, soft and depressible, non-tender to palpation, pelvis non-tender but fixed  MUSCULOSKELETAL: No cyanosis or clubbing. No gross deformities.     LABS:                        10.4   12.14 )-----------( 281      ( 2020 16:21 )             31.6         136  |  97<L>  |  12.0  ----------------------------<  91  4.4   |  24.0  |  0.81    Ca    8.5<L>      2020 07:36    TPro  7.0  /  Alb  2.8<L>  /  TBili  0.3<L>  /  DBili  x   /  AST  17  /  ALT  10  /  AlkPhos  84      Lactate: acetaminophen   Tablet .. 650 milliGRAM(s) Oral every 6 hours PRN  cyanocobalamin 1000 MICROGram(s) Oral daily  dronabinol 2.5 milliGRAM(s) Oral two times a day  gabapentin Oral Tab/Cap - Peds 300 milliGRAM(s) Oral two times a day  morphine  - Injectable 2 milliGRAM(s) IV Push every 4 hours PRN  morphine  - Injectable 4 milliGRAM(s) IV Push every 4 hours PRN  pyridoxine 100 milliGRAM(s) Oral daily     PT/INR - ( 2020 18:45 )   PT: 15.4 sec;   INR: 1.35 ratio         PTT - ( 2020 18:45 )  PTT:31.0 sec          Urinalysis Basic - ( 2020 18:54 )    Color: Yellow / Appearance: Slightly Turbid / S.010 / pH: x  Gluc: x / Ketone: Negative  / Bili: Negative / Urobili: Negative   Blood: x / Protein: 100 / Nitrite: Positive   Leuk Esterase: Moderate / RBC: 0-2 /HPF / WBC 11-25   Sq Epi: x / Non Sq Epi: Occasional / Bacteria: Moderate        IMAGING:    EXAM:  CT ANGIO ABD PELV (W)AW IC                          PROCEDURE DATE:  2020      BOWEL: No evidence of obstruction. Normal distal esophagus, stomach and duodenum. Thereis invasion of the rectum by the pelvic tumor with wall thickening of the rectum. Immediately afferent to the tumor, there is mildly dilated colon which contains oral contrast. The contrast does not pass the tumor. Appendix appears to have been removed. There may be a small focus of bleeding at the tumor site on the arterial phase sequence (series 10, image 117) that is not seen on the noncontrast study.    ADRENALS: Left adrenal nodule measuring 1.9 cm, not clearly benign.  KIDNEYS/URETERS: Bilateral percutaneous nephrostomies with the tips coiled in the renal pelvises bilaterally. Small amount of air in the renal pelvises bilaterally. No hydronephrosis. The distal right ureter is dilated and indistinguishable from the pelvic mass. The distal left ureter is indistinguishable from the pelvic mass.    BLADDER: There is extensive bladder invasion by tumor.  REPRODUCTIVE ORGANS: Absent uterus. In the uterine bed, there is a lobulated, necrotic mass with areas of air extensive adjacent inflammatory changes measuring approximately 10.0 x 8.9 x 13.8 cm. Mass previously measured 9.5 x 6.8 x 5.7 cm.    PERITONEUM: No ascites.  RETROPERITONEUM/LYMPH NODES: Mild lymphadenopathy, extending along the left iliac chain.    BONES: No acute osseous pathology. Right hip replacement.    IMPRESSION:  1. Oral contrast on the left-sided colon limits the ability to evaluate for active bleeding.  2. Large pelvic tumor, significantly increased in size. The tumor appears to invade and obstruct the rectosigmoid colon. At the site of invasion, there may be a small amount of active bleeding.  3. Air in the renal pelvises bilaterally, with dilated distal right ureter and both ureters indistinguishable from the tumor distally. These findings and the presence of air within the tumor may be compatible with fistula as previously questioned.  4. Extensive invasion of the bladder by tumor, not evident previously.

## 2020-07-30 NOTE — CONSULT NOTE ADULT - PROBLEM SELECTOR RECOMMENDATION 9
-s/p robotic laparoscopic hysterectomy and bilateral salpingo-oophorectomy on 10/12/2017.  Vaginal apex recurrence s/p biopsy on 1/2/18 was positive for endometrial adenocarcinoma, c/w a recurrent endometrioid type adenocarcinoma, diffusely ER positive (almost 100%), and OH positive (>90%).  -on doxil  -scan on 7/27 with report of pelvic mass inseparable from the vaginal cuff and posterior aspect of the bladder and anterior wall of the rectum.  -further tx as outpt continued with Dr. Stanton when medically optimized.
plan per Gyn Onc and Oncology

## 2020-07-30 NOTE — CONSULT NOTE ADULT - PROBLEM SELECTOR RECOMMENDATION 2
chart and CT scan reviewed with Dr Roberts: no urological intervention for probable fistula or bladder invasion/distal bilateral ureteral obstruction due to invasive metastatic endometrial ca, continue bilateral nephrostomy tube drainage, recommend changing bilateral nephrostomy tubes by IR chart and CT scan reviewed with Dr Roberts: no urological intervention for probable fistula or bladder invasion/distal bilateral ureteral obstruction due to invasive metastatic endometrial ca, continue bilateral nephrostomy tube drainage, recommend changing bilateral nephrostomy tubes by IR every 3 months

## 2020-07-31 NOTE — GOALS OF CARE CONVERSATION - ADVANCED CARE PLANNING - TREATMENT GUIDELINE COMMENT
Family would still want her treated, considering [place]-rectal surgery but is determined to take Mom home until it is decided she will have surgery, and when would that be?

## 2020-07-31 NOTE — PROGRESS NOTE ADULT - SUBJECTIVE AND OBJECTIVE BOX
INTERVAL HPI/OVERNIGHT EVENTS: 70 yo Female Patient PMH of Endometrial Cancer admitted with Diarrhea and Acute blood loss anemia.  CT confirms that a large pelvic tumor has eroded colon, fistula formation causing moderate to severe pain and pressure.  [facility] was consulted, they are proposing a Palliative Diverting Colostomy.  F/U Note  CC:" I want to go home with my daughter"  Patient very anxious  Being alone in a strange hospital, language barrier  Limited visiting hours for families during COVID precautions  Poor appetite asking for alternate small snack rather than large sandwich on roll  She denies N/V/D CP palpitations dizziness, SOB very little abdominal pain at present while in bed.    69y old  Female who presents with a chief complaint of diarrhea, anemia (2020 12:15)    Present Symptoms:     Dyspnea: 0    Nausea/Vomiting:  No  Anxiety:  Yes   Depression: Yes   Fatigue: Yes   Loss of appetite: Yes     Pain: denies            Character-            Duration-            Effect-            Factors-            Frequency-            Location-            Severity-    Review of Systems: Reviewed                     All others negative    MEDICATIONS  (STANDING):  bisacodyl 5 milliGRAM(s) Oral every 12 hours  cyanocobalamin 1000 MICROGram(s) Oral daily  dronabinol 2.5 milliGRAM(s) Oral two times a day  gabapentin Oral Tab/Cap - Peds 300 milliGRAM(s) Oral two times a day  pyridoxine 100 milliGRAM(s) Oral daily    MEDICATIONS  (PRN):  acetaminophen   Tablet .. 650 milliGRAM(s) Oral every 6 hours PRN Mild Pain (1 - 3)  oxyCODONE    IR 5 milliGRAM(s) Oral every 4 hours PRN Moderate Pain (4 - 6)    LABS:                        10.6   8.70  )-----------( 261      ( 2020 06:04 )             32.5     07-31    135  |  96<L>  |  12.0  ----------------------------<  86  4.1   |  26.0  |  0.94    Ca    8.6      2020 06:04  Phos  3.6     07-31  Mg     1.4     07-31    TPro  7.0  /  Alb  2.8<L>  /  TBili  0.3<L>  /  DBili  x   /  AST  17  /  ALT  10  /  AlkPhos  84  07-    PT/INR - ( 2020 18:45 )   PT: 15.4 sec;   INR: 1.35 ratio       PTT - ( 2020 18:45 )  PTT:31.0 sec  Urinalysis Basic - ( 2020 18:54 )    Color: Yellow / Appearance: Slightly Turbid / S.010 / pH: x  Gluc: x / Ketone: Negative  / Bili: Negative / Urobili: Negative   Blood: x / Protein: 100 / Nitrite: Positive   Leuk Esterase: Moderate / RBC: 0-2 /HPF / WBC 11-25   Sq Epi: x / Non Sq Epi: Occasional / Bacteria: Moderate    I&O's Summary    2020 07:  -  2020 07:00  --------------------------------------------------------  IN: 0 mL / OUT: 2290 mL / NET: -2290 mL    2020 07:  -  2020 15:59  --------------------------------------------------------  IN: 0 mL / OUT: 500 mL / NET: -500 mL    RADIOLOGY & ADDITIONAL STUDIES:    ADVANCE DIRECTIVES:   DNR  NO  Completed on:                     MOLST   NO   Completed on:  Living Will  NO   Completed on:

## 2020-07-31 NOTE — PROGRESS NOTE ADULT - SUBJECTIVE AND OBJECTIVE BOX
HPI/OVERNIGHT EVENTS: Patient seen and examined at bedside this AM. Patient persistently tachycardic.   Vital Signs Last 24 Hrs  T(C): 36.9 (2020 05:08), Max: 37.6 (2020 07:45)  T(F): 98.5 (2020 05:08), Max: 99.7 (2020 07:45)  HR: 112 (2020 05:08) (105 - 120)  BP: 125/80 (2020 05:08) (111/75 - 141/71)  BP(mean): --  RR: 18 (2020 05:08) (18 - 20)  SpO2: 93% (2020 05:08) (93% - 98%)    I&O's Detail    2020 07:01  -  2020 07:00  --------------------------------------------------------  IN:  Total IN: 0 mL    OUT:    Nephrostomy Tube: 1400 mL    Nephrostomy Tube: 890 mL  Total OUT: 2290 mL    Total NET: -2290 mL              Constitutional: patient resting comfortably in bed, in no acute distress  Respiratory: respirations are unlabored, no conversational dyspnea  Cardiovascular: regular rate & rhythm   Gastrointestinal: Abdomen soft, non-tender, non-distended, no rebound tenderness / guarding        LABS:                        10.6   8.70  )-----------( 261      ( 2020 06:04 )             32.5     07-31    135  |  96<L>  |  12.0  ----------------------------<  86  4.1   |  26.0  |  0.94    Ca    8.6      2020 06:04  Phos  3.6     07-31  Mg     1.4     07-31    TPro  7.0  /  Alb  2.8<L>  /  TBili  0.3<L>  /  DBili  x   /  AST  17  /  ALT  10  /  AlkPhos  84  07-29    PT/INR - ( 2020 18:45 )   PT: 15.4 sec;   INR: 1.35 ratio         PTT - ( 2020 18:45 )  PTT:31.0 sec  Urinalysis Basic - ( 2020 18:54 )    Color: Yellow / Appearance: Slightly Turbid / S.010 / pH: x  Gluc: x / Ketone: Negative  / Bili: Negative / Urobili: Negative   Blood: x / Protein: 100 / Nitrite: Positive   Leuk Esterase: Moderate / RBC: 0-2 /HPF / WBC 11-25   Sq Epi: x / Non Sq Epi: Occasional / Bacteria: Moderate        MEDICATIONS  (STANDING):  bisacodyl 5 milliGRAM(s) Oral every 12 hours  cyanocobalamin 1000 MICROGram(s) Oral daily  dronabinol 2.5 milliGRAM(s) Oral two times a day  gabapentin Oral Tab/Cap - Peds 300 milliGRAM(s) Oral two times a day  magnesium sulfate  IVPB 1 Gram(s) IV Intermittent once  pyridoxine 100 milliGRAM(s) Oral daily    MEDICATIONS  (PRN):  acetaminophen   Tablet .. 650 milliGRAM(s) Oral every 6 hours PRN Mild Pain (1 - 3)  oxyCODONE    IR 5 milliGRAM(s) Oral every 4 hours PRN Moderate Pain (4 - 6) HPI/OVERNIGHT EVENTS: Patient seen and examined at bedside this AM. Patient persistently tachycardic.     Vital Signs Last 24 Hrs  T(C): 36.9 (2020 05:08), Max: 37.6 (2020 07:45)  T(F): 98.5 (2020 05:08), Max: 99.7 (2020 07:45)  HR: 112 (2020 05:08) (105 - 120)  BP: 125/80 (2020 05:08) (111/75 - 141/71)  RR: 18 (2020 05:08) (18 - 20)  SpO2: 93% (2020 05:08) (93% - 98%)    I&O's Detail    2020 07:01  -  2020 07:00  --------------------------------------------------------  IN:  Total IN: 0 mL    OUT:    Nephrostomy Tube: 1400 mL    Nephrostomy Tube: 890 mL  Total OUT: 2290 mL    Total NET: -2290 mL    Constitutional: patient resting comfortably in bed, in no acute distress  Respiratory: respirations are unlabored, no conversational dyspnea  Cardiovascular: regular rate & rhythm   Gastrointestinal: Abdomen soft, non-tender, non-distended, no rebound tenderness / guarding    LABS:                        10.6   8.70  )-----------( 261      ( 2020 06:04 )             32.5     07-31    135  |  96<L>  |  12.0  ----------------------------<  86  4.1   |  26.0  |  0.94    Ca    8.6      2020 06:04  Phos  3.6     07-31  Mg     1.4     07-31    TPro  7.0  /  Alb  2.8<L>  /  TBili  0.3<L>  /  DBili  x   /  AST  17  /  ALT  10  /  AlkPhos  84  07-29    PT/INR - ( 2020 18:45 )   PT: 15.4 sec;   INR: 1.35 ratio         PTT - ( 2020 18:45 )  PTT:31.0 sec  Urinalysis Basic - ( 2020 18:54 )    Color: Yellow / Appearance: Slightly Turbid / S.010 / pH: x  Gluc: x / Ketone: Negative  / Bili: Negative / Urobili: Negative   Blood: x / Protein: 100 / Nitrite: Positive   Leuk Esterase: Moderate / RBC: 0-2 /HPF / WBC 11-25   Sq Epi: x / Non Sq Epi: Occasional / Bacteria: Moderate        MEDICATIONS  (STANDING):  bisacodyl 5 milliGRAM(s) Oral every 12 hours  cyanocobalamin 1000 MICROGram(s) Oral daily  dronabinol 2.5 milliGRAM(s) Oral two times a day  gabapentin Oral Tab/Cap - Peds 300 milliGRAM(s) Oral two times a day  magnesium sulfate  IVPB 1 Gram(s) IV Intermittent once  pyridoxine 100 milliGRAM(s) Oral daily    MEDICATIONS  (PRN):  acetaminophen   Tablet .. 650 milliGRAM(s) Oral every 6 hours PRN Mild Pain (1 - 3)  oxyCODONE    IR 5 milliGRAM(s) Oral every 4 hours PRN Moderate Pain (4 - 6)

## 2020-07-31 NOTE — GOALS OF CARE CONVERSATION - ADVANCED CARE PLANNING - CONVERSATION DETAILS
Daughter Leonarda Paige called me this morning, wanting to discuss her mother's distress at being alone without family at her bedside.  Mother called her at 4:30 am very anxious began crying; saying that she doesn't want to stay in hospital if they  are not doing any special procedures.    She was asking if there was a way to take her mother home, get some help with her care and pursue the question of  going ahead with Colorectal Palliative Colostomy as an Out patient same day admit    I spoke with Dr. Winters GYN Physician, about Patient's daughter's request to consider discharging her ASAP,  work towards her surgery while she's home with her family    Daughter asking to intercede to get permission to have a family member at her bedside, which will calm Patient and provide   for [place] language barrier.    I did discuss the matter of allowing family to stay at her bedside with [facility] Manager Alondra. At this time, unless it is for   accomodating families to visit their loved one at end of life, [facility] will not allow visitors because of Patient's  anxiety.    I had a second conversation with Leonarda about Goals and possible discharge planning

## 2020-07-31 NOTE — GOALS OF CARE CONVERSATION - ADVANCED CARE PLANNING - WHAT MATTERS MOST
Very concerned about their Mother's inability to cope with being seperated from her family, and further isolated because of language barrier  She asked me to check her CBC- if there has been a drop or stableization of her anemia since yesterdays transfusions

## 2020-07-31 NOTE — CHART NOTE - NSCHARTNOTEFT_GEN_A_CORE
Pt is continuously tachycardic, denies SOB, chest pain  EKG ordered, sinus tachy  Vital Signs Last 24 Hrs  T(C): 37.3 (30 Jul 2020 23:37), Max: 37.6 (30 Jul 2020 07:45)  T(F): 99.1 (30 Jul 2020 23:37), Max: 99.7 (30 Jul 2020 07:45)  HR: 113 (30 Jul 2020 23:37) (105 - 120)  BP: 141/71 (30 Jul 2020 23:37) (107/70 - 141/71)  RR: 20 (30 Jul 2020 23:37) (18 - 20)  SpO2: 94% (30 Jul 2020 23:37) (94% - 98%)    Pt had elevated lactate on admission with no follow up drawn, likely normal now, given no fever, will add to the morning labs to make sure

## 2020-07-31 NOTE — PROGRESS NOTE ADULT - SUBJECTIVE AND OBJECTIVE BOX
GYNECOLOGIC ONCOLOGY PROGRESS NOTE    HD#3    PROBLEMS:  Anemia  Endometrial cancer  Suspected deep vein thrombosis (DVT)  Vesicovaginal fistula  Bilateral Nephrostomies   Melena     Pt seen and examined at bedside with Dr. Gonzales Peterson and Tasha Alvarez MS4    SUBJECTIVE:    Patient reports she feels better this morning. She describes some rectal pressure overnight, but she does not have it now  She reports vaginal bleeding, improved from yesterday  She reports bowel movement this AM  Flatus: Yes   Denies Nausea, Vomiting or Diarrhea.  Denies shortness of breath, palpitations, chest pain or dyspnea on exertion.  Tolerating diet.   Bilateral nephrostomy tubes functional.    OBJECTIVE:   Vital Signs Last 24 Hrs  T(C): 36.9 (31 Jul 2020 05:08), Max: 37.6 (30 Jul 2020 07:45)  T(F): 98.5 (31 Jul 2020 05:08), Max: 99.7 (30 Jul 2020 07:45)  HR: 112 (31 Jul 2020 05:08) (105 - 120)  BP: 125/80 (31 Jul 2020 05:08) (111/75 - 141/71)  RR: 18 (31 Jul 2020 05:08) (18 - 20)  SpO2: 93% (31 Jul 2020 05:08) (93% - 98%)      I&O's Detail    30 Jul 2020 07:01  -  31 Jul 2020 07:00  --------------------------------------------------------  IN:  Total IN: 0 mL    OUT:    Nephrostomy Tube: 1400 mL    Nephrostomy Tube: 890 mL  Total OUT: 2290 mL    Total NET: -2290 mL      MEDICATIONS  (STANDING):  bisacodyl 5 milliGRAM(s) Oral every 12 hours  cyanocobalamin 1000 MICROGram(s) Oral daily  dronabinol 2.5 milliGRAM(s) Oral two times a day  gabapentin Oral Tab/Cap - Peds 300 milliGRAM(s) Oral two times a day  magnesium sulfate  IVPB 1 Gram(s) IV Intermittent once  pyridoxine 100 milliGRAM(s) Oral daily    MEDICATIONS  (PRN):  acetaminophen   Tablet .. 650 milliGRAM(s) Oral every 6 hours PRN Mild Pain (1 - 3)  oxyCODONE    IR 5 milliGRAM(s) Oral every 4 hours PRN Moderate Pain (4 - 6)        Physical Exam:  Constitutional: NAD, AOx3  Pulmonary: clear to auscultation bilaterally   Cardiovascular: tachycardiac, Regular rate and rhythm   Abdomen: obese, nondistended, mostly soft, however more firm in L. abdomen, nontender, normal bowel sounds, dull, abdominal fullness noted    Extremities: no edema.  Pelvic: mildly soiled pad      LABS:                        10.6   8.70  )-----------( 261      ( 31 Jul 2020 06:04 )             32.5                           9.5    12.84 )-----------( 303      ( 30 Jul 2020 07:36 )             28.6     07-31    135  |  96<L>  |  12.0  ----------------------------<  86  4.1   |  26.0  |  0.94    Ca    8.6      31 Jul 2020 06:04  Phos  3.6     07-31  Mg     1.4     07-31    TPro  7.0  /  Alb  2.8<L>  /  TBili  0.3<L>  /  DBili  x   /  AST  17  /  ALT  10  /  AlkPhos  84  07-29    Lactate, Blood (07.31.20 @ 05:40)    Lactate, Blood: 0.8 mmol/L        RADIOLOGY & ADDITIONAL TESTS:    < from: CT Angio Abdomen and Pelvis w/ IV Cont (07.29.20 @ 22:18) >   EXAM:  CT ANGIO ABD PELV (W)AW IC                          PROCEDURE DATE:  07/29/2020          INTERPRETATION:  CLINICAL INFORMATION:  abd pain, lower GI bleeding, vaginal bleeding, history of endometrial cancer with fistula from the left ureter to the vaginal cuff  COMPARISON: 7/6/2020  PROCEDURE:  CT of the Abdomen and Pelvis was performed with and without intravenous contrast.  Precontrast, Arterial and Delayed phases were performed.  Intravenous contrast: 90 ml Omnipaque 350. 10 ml discarded.  Oral contrast: None.  Sagittal and coronal reformats were performed.    IMPRESSION:  1. Oral contrast on the left-sided colon limits the ability to evaluate for active bleeding.  2. Large pelvic tumor, significantly increased in size. The tumor appears to invade and obstruct the rectosigmoid colon. At the site of invasion, there may be a small amount of active bleeding.  3. Air in the renal pelvises bilaterally, with dilated distal right ureter and both ureters indistinguishable from the tumor distally. These findings and the presence of air within the tumor may be compatible with fistula as previously questioned.  4. Extensive invasion of the bladder by tumor, not evident previously.    ELIDA THOMAS   This document has been electronically signed. Jul 29 2020 11:02PM    < end of copied text >    < from: US Duplex Venous Lower Ext Complete, Bilateral (07.30.20 @ 12:03) >     EXAM:  US DPLX LWR EXT VEINS COMPL BI                          PROCEDURE DATE:  07/30/2020          INTERPRETATION:  CLINICAL INFORMATION: Recurrent endometrial carcinoma with bilateral lower extremity pain.    COMPARISON: None available.    TECHNIQUE: Duplex sonography of the BILATERAL LOWER extremity veins with color and spectral Doppler, with and without compression.    FINDINGS:    There is normal compressibility of the bilateral common femoral, femoral and popliteal veins.  Doppler examination shows normal spontaneous and phasic flow.    No calf vein thrombosis is detected.    IMPRESSION:  No evidence of deep venous thrombosis in either lower extremity.    < end of copied text >      EKG: sinus tachycardia

## 2020-07-31 NOTE — PROGRESS NOTE ADULT - SUBJECTIVE AND OBJECTIVE BOX
Patient seen for afternoon rounds. She reports tolerating a small portion of her lunch and denies abdominal pain or pressure. Palliative recommendations appreciated, will discuss with Dr. Cartagena. Will arrange interdisciplinary meeting between patients care teams to discuss goals of care. Palliative discussed briefly possible hospice referral with the patients daughter. Will plan to arrange meeting ASAP. Actively working with nursing staff to allow extended visitation with patient as she does not speak English and had episode of confusion this AM, calling her family at 4:30 AM crying/depressed and confused about her current hospital course and situation. Patient seen for afternoon rounds. She reports tolerating a small portion of her lunch and denies abdominal pain or pressure. Palliative recommendations appreciated, will discuss with Dr. Cartagena. Will arrange multidisciplinary meeting between patients care teams to discuss goals of care. Palliative discussed briefly possible hospice referral with the patients daughter. Will plan to arrange meeting ASAP. Actively working with nursing staff to allow extended visitation with patient as she does not speak English and had episode of confusion this AM, calling her family at 4:30 AM crying/depressed and confused about her current hospital course and situation.

## 2020-08-01 NOTE — PROGRESS NOTE ADULT - ATTENDING COMMENTS
COUNSELING:    Telephone meeting to discuss Advanced Care Planning -Multidisciplinary Time Spent __20+____ Minutes.  See goals of care note.    More than 50% time spent in counseling and coordinating care. ___30___ Minutes.     Thank you for the opportunity to assist with the care of this patient.   [facility] [phone].
I was physically present for the key portions of the evaluation and management (E/M) services provided.  I agree with the above history, physical, and plan which I have reviewed and edited where appropriate    Gonzales Peterson M.D.
Patient seen and examined this morning. Denies any abdominal pain at rest and has been having bowel movements. Abdomen is soft, obese, fulness in the lower abdomen. I again discussed CT findings of tumor invading into the rectosigmoid colon. She has a large burden of stool proximally but is not obstructed and still passing stool. I discussed that with her advanced disease, only options colorectal surgery can offer her is proximal colostomy to divert her stool. This would help relieve obstruction if it occurs but would not prevent GI bleed if she should experience this from erosion of the tumor into the colon. She would like to wait to have discussion with daughter. There is no urgency in colostomy as again she is having bowel function. Recommend increasing bowel regimen. Will follow.
I was physically present for the key portions of the evaluation and management (E/M) services provided.  I agree with the above history, physical, and plan which I have reviewed and edited where appropriate    Gonzales Peterson M.D.
I was physically present for the key portions of the evaluation and management (E/M) services provided.  I agree with the above history, physical, and plan which I have reviewed and edited where appropriate    Gonzales Peterson M.D.

## 2020-08-01 NOTE — PROGRESS NOTE ADULT - SUBJECTIVE AND OBJECTIVE BOX
GYNECOLOGIC ONCOLOGY PROGRESS NOTE    HD#4    PROBLEMS:  Anemia  Endometrial cancer  Suspected deep vein thrombosis (DVT)  Vesicovaginal fistula  Bilateral Nephrostomies   Melena     Pt seen and examined at bedside with Dr. Blevins    SUBJECTIVE:    Patient reports she feels well this morning. She had no problems overnight.  She denies vaginal bleeding  She reports bowel movement this AM. Nurse aid in room with bed pan.  Flatus: Yes   Denies Nausea, Vomiting or Diarrhea.  Denies shortness of breath, palpitations, chest pain or dyspnea on exertion.  Tolerating diet.   Bilateral nephrostomy tubes functional.    OBJECTIVE:   Vital Signs Last 24 Hrs  T(C): 36.7 (01 Aug 2020 07:52), Max: 37.5 (31 Jul 2020 20:00)  T(F): 98 (01 Aug 2020 07:52), Max: 99.5 (31 Jul 2020 20:00)  HR: 110 (01 Aug 2020 08:00) (108 - 114)  BP: 108/67 (01 Aug 2020 07:52) (108/67 - 142/83)  RR: 20 (01 Aug 2020 07:52) (18 - 20)  SpO2: 96% (01 Aug 2020 07:52) (92% - 96%)    I&O's Detail    31 Jul 2020 07:01  -  01 Aug 2020 07:00  --------------------------------------------------------  IN:  Total IN: 0 mL    OUT:    Nephrostomy Tube: 975 mL    Nephrostomy Tube: 500 mL  Total OUT: 1475 mL    Total NET: -1475 mL      MEDICATIONS  (STANDING):  bisacodyl 5 milliGRAM(s) Oral every 12 hours  cyanocobalamin 1000 MICROGram(s) Oral daily  dronabinol 2.5 milliGRAM(s) Oral two times a day  gabapentin Oral Tab/Cap - Peds 300 milliGRAM(s) Oral two times a day  magnesium sulfate  IVPB 1 Gram(s) IV Intermittent once  pyridoxine 100 milliGRAM(s) Oral daily    MEDICATIONS  (PRN):  acetaminophen   Tablet .. 650 milliGRAM(s) Oral every 6 hours PRN Mild Pain (1 - 3)  oxyCODONE    IR 5 milliGRAM(s) Oral every 4 hours PRN Moderate Pain (4 - 6)      Physical Exam:  Constitutional: NAD, AOx3  Pulmonary: clear to auscultation bilaterally   Cardiovascular: tachycardiac, Regular rate and rhythm   Abdomen: obese, nondistended, mostly soft, however more firm in L. abdomen, nontender, normal bowel sounds.  Extremities: no edema.  Pelvic: Mid bowel movement, undergoing change of diaper      LABS:                        10.6   8.70  )-----------( 261      ( 31 Jul 2020 06:04 )             32.5                           9.5    12.84 )-----------( 303      ( 30 Jul 2020 07:36 )             28.6     07-31    135  |  96<L>  |  12.0  ----------------------------<  86  4.1   |  26.0  |  0.94    Ca    8.6      31 Jul 2020 06:04  Phos  3.6     07-31  Mg     1.4     07-31    TPro  7.0  /  Alb  2.8<L>  /  TBili  0.3<L>  /  DBili  x   /  AST  17  /  ALT  10  /  AlkPhos  84  07-29    Lactate, Blood (07.31.20 @ 05:40)    Lactate, Blood: 0.8 mmol/L        RADIOLOGY & ADDITIONAL TESTS:    < from: CT Angio Abdomen and Pelvis w/ IV Cont (07.29.20 @ 22:18) >   EXAM:  CT ANGIO ABD PELV (W)AW IC                          PROCEDURE DATE:  07/29/2020          INTERPRETATION:  CLINICAL INFORMATION:  abd pain, lower GI bleeding, vaginal bleeding, history of endometrial cancer with fistula from the left ureter to the vaginal cuff  COMPARISON: 7/6/2020  PROCEDURE:  CT of the Abdomen and Pelvis was performed with and without intravenous contrast.  Precontrast, Arterial and Delayed phases were performed.  Intravenous contrast: 90 ml Omnipaque 350. 10 ml discarded.  Oral contrast: None.  Sagittal and coronal reformats were performed.    IMPRESSION:  1. Oral contrast on the left-sided colon limits the ability to evaluate for active bleeding.  2. Large pelvic tumor, significantly increased in size. The tumor appears to invade and obstruct the rectosigmoid colon. At the site of invasion, there may be a small amount of active bleeding.  3. Air in the renal pelvises bilaterally, with dilated distal right ureter and both ureters indistinguishable from the tumor distally. These findings and the presence of air within the tumor may be compatible with fistula as previously questioned.  4. Extensive invasion of the bladder by tumor, not evident previously.    ELIDA THOMAS   This document has been electronically signed. Jul 29 2020 11:02PM    < end of copied text >    < from: US Duplex Venous Lower Ext Complete, Bilateral (07.30.20 @ 12:03) >     EXAM:  US DPLX LWR EXT VEINS COMPL BI                          PROCEDURE DATE:  07/30/2020          INTERPRETATION:  CLINICAL INFORMATION: Recurrent endometrial carcinoma with bilateral lower extremity pain.    COMPARISON: None available.    TECHNIQUE: Duplex sonography of the BILATERAL LOWER extremity veins with color and spectral Doppler, with and without compression.    FINDINGS:    There is normal compressibility of the bilateral common femoral, femoral and popliteal veins.  Doppler examination shows normal spontaneous and phasic flow.    No calf vein thrombosis is detected.    IMPRESSION:  No evidence of deep venous thrombosis in either lower extremity.    < end of copied text >      EKG: sinus tachycardia

## 2020-08-01 NOTE — CHART NOTE - NSCHARTNOTEFT_GEN_A_CORE
Spoke to patient with Dr. Joya, patient adamant about refusing diverting ostomy. Colorectal surgery will sign off at this time.    Thank you

## 2020-08-01 NOTE — PROGRESS NOTE ADULT - SUBJECTIVE AND OBJECTIVE BOX
INTERVAL HPI/OVERNIGHT EVENTS:    No acute overnight events. Multidisciplinary discussion with patient, GYN, palliative, colorectal surgery and patient's family members was undertaken yesterday. Options were discussed, family wanting to finalize decision.       MEDICATIONS  (STANDING):  bisacodyl 5 milliGRAM(s) Oral every 12 hours  cyanocobalamin 1000 MICROGram(s) Oral daily  dronabinol 2.5 milliGRAM(s) Oral two times a day  gabapentin Oral Tab/Cap - Peds 300 milliGRAM(s) Oral two times a day  pyridoxine 100 milliGRAM(s) Oral daily    MEDICATIONS  (PRN):  acetaminophen   Tablet .. 650 milliGRAM(s) Oral every 6 hours PRN Mild Pain (1 - 3)  oxyCODONE    IR 5 milliGRAM(s) Oral every 4 hours PRN Moderate Pain (4 - 6)      Vital Signs Last 24 Hrs  T(C): 36.8 (31 Jul 2020 23:05), Max: 37.5 (31 Jul 2020 20:00)  T(F): 98.3 (31 Jul 2020 23:05), Max: 99.5 (31 Jul 2020 20:00)  HR: 112 (31 Jul 2020 23:05) (106 - 114)  BP: 130/87 (31 Jul 2020 23:05) (117/75 - 149/78)  BP(mean): --  RR: 18 (31 Jul 2020 23:05) (18 - 18)  SpO2: 92% (31 Jul 2020 23:05) (92% - 96%)    PE  Gen: No acute distress  Pulm: Nonlabored breathing, no conversational dyspnea   CV: RRR, S1, S2  Abd: nontender, nondistended, no guarding  Ext: No pitting edema B/L  Vasc: 2+ radial and PT pulses B/L  Neuro: AAOX3        I&O's Detail    30 Jul 2020 07:01  -  31 Jul 2020 07:00  --------------------------------------------------------  IN:  Total IN: 0 mL    OUT:    Nephrostomy Tube: 1400 mL    Nephrostomy Tube: 890 mL  Total OUT: 2290 mL    Total NET: -2290 mL      31 Jul 2020 07:01  -  01 Aug 2020 05:08  --------------------------------------------------------  IN:  Total IN: 0 mL    OUT:    Nephrostomy Tube: 975 mL    Nephrostomy Tube: 500 mL  Total OUT: 1475 mL    Total NET: -1475 mL          LABS:                        10.6   8.70  )-----------( 261      ( 31 Jul 2020 06:04 )             32.5     07-31    135  |  96<L>  |  12.0  ----------------------------<  86  4.1   |  26.0  |  0.94    Ca    8.6      31 Jul 2020 06:04  Phos  3.6     07-31  Mg     1.4     07-31            RADIOLOGY & ADDITIONAL STUDIES:

## 2020-08-02 NOTE — DISCHARGE NOTE NURSING/CASE MANAGEMENT/SOCIAL WORK - PATIENT PORTAL LINK FT
You can access the FollowMyHealth Patient Portal offered by [facility] by registering at the following website: [email or web address]. By joining [facility]’s FollowMyHealth portal, you will also be able to view your health information using other applications (apps) compatible with our system.

## 2020-08-02 NOTE — DISCHARGE NOTE PROVIDER - NSDCMRMEDTOKEN_GEN_ALL_CORE_FT
dronabinol 2.5 mg oral capsule: 1 cap(s) orally 2 times a day  gabapentin 300 mg oral capsule: 1  orally 2 times a day  oxyCODONE 5 mg oral tablet: 1 tab(s) orally every 4 hours, As needed, Moderate Pain (4 - 6) MDD:6  Vitamin B-12 1000 mcg oral tablet: 1 tab(s) orally once a day  Vitamin B6 100 mg oral tablet: 1 tab(s) orally once a day

## 2020-08-02 NOTE — DISCHARGE NOTE PROVIDER - NSDCCPCAREPLAN_GEN_ALL_CORE_FT
PRINCIPAL DISCHARGE DIAGNOSIS  Diagnosis: Anemia  Assessment and Plan of Treatment:       SECONDARY DISCHARGE DIAGNOSES  Diagnosis: Endometrial cancer  Assessment and Plan of Treatment:     Diagnosis: Rectal bleed  Assessment and Plan of Treatment:     Diagnosis: UTI (urinary tract infection)  Assessment and Plan of Treatment:

## 2020-08-02 NOTE — DISCHARGE NOTE PROVIDER - CARE PROVIDER_API CALL
Curtis Cartagena  GYNECOLOGIC ONCOLOGY  [facility] [address] [place] [address]  Phone: [phone]  Fax: [phone]  Follow Up Time:

## 2020-08-02 NOTE — PROGRESS NOTE ADULT - ASSESSMENT
70yo  LMP  s/p RA TLH BSO (2017) and CRT (2018) with recurrent endometrial cancer on 2nd cycle of Carboplatin with known vesicovaginal fistula, bilateral nephrostomies and disease involving vaginal cuff, ureters, rectosigmoid colon and now bladder per CT A/P () admitted with UTI, symptomatic anemia for vaginal bleeding requiring blood transfusion and melena.     Cancer type: Recurrent endometrial cancer. H/o Carbo/Taxol (), Vag cuff SBRT (2018), Last PET/CT (2019) suspicious for metastasis to iliac lymph nodes and persistent disease in vag cuff, s/p Pembrolizumab () and Doxorubicin Liposomal (10/2019-2020), now on 2nd cycle Carboplatin. Palliative Team consulted.    Cardiac: H/o Sinus tachycardia. Continues tachycardic. No other cardiac issues, BP well controlled.   Pulmonary: no active disease. No oxygen requirements. CXR () negative.    Neuro: Pain well controlled with current regimen. Tylenol PO standing and Morphine IVP PRN. C/w home Gabapentin for h/o peripheral neuropathy  Endo: No active disease    GI: Tolerating PO. No peritoneal signs. CT A/P () suspicious of partial obstruction/invasion of mass at level of rectosigmoid colon with possible active bleed. Last BM yesterday semi-solid form. Colorectal surgery consult.     : Bilateral nephrostomy tubes functional. UA suggestive of UTI s/p single dose of Meropenum in ED. Urine Cx pending. Dr. Roberts Urology consulted. CT A/P () concerning for mass invasion to bladder. Pad count to assess for vaginal bleeding ordered.   ID: Afebrile. Known h/o sinus tachycardia. Sepsis protocol followed in ED. Lactate 2.2 pending repeat. WBC 12.8 mildly elevated from previous 11.5 s/p single dose of Meropenum in ED. Blood and Urine Cx pending. Urology consulted.   Heme: Hgb 6.8 () now s/p 3rd uPRBC for symptomatic anemia in setting of active vaginal and GI bleed. Post-transfusion CBC with Hgb 9.5 after 2 units. Goal Hgb >10. Oral DVT ppx on hold. SCDs while in bedrest. Pending LE Dopplers to r/o DVT given LT LE calf pain on examination.   Skin: no active disease   Psych: H/o depression. Continue with home medications.   FEN: No fluids. Saline Lock. Electrolytes wnl. Will continue AM labs.   Code Status: Full code
68yo  LMP 2004 s/p RA TLH BSO (2017) and CRT (2018) with recurrent endometrial cancer on 2nd cycle of Carboplatin with known vesicovaginal fistula, bilateral nephrostomies and disease involving vaginal cuff, ureters, rectosigmoid colon and now bladder per CT A/P () admitted with UTI, symptomatic anemia for vaginal bleeding requiring blood transfusion and melena.     Cancer type: Recurrent endometrial cancer. H/o Carbo/Taxol (), Vag cuff SBRT (2018), Last PET/CT (2019) suspicious for metastasis to iliac lymph nodes and persistent disease in vag cuff, s/p Pembrolizumab () and Doxorubicin Liposomal (10/2019-2020), now on 2nd cycle Carboplatin. Palliative Team consulted.    Cardiac: H/o Sinus tachycardia. Continues tachycardic. No other cardiac issues, BP well controlled. EKG showing sinus tachycardia  Pulmonary: no active disease. No oxygen requirements. CXR () negative.    Neuro: Pain well controlled with current regimen. Tylenol PO standing and Morphine IVP PRN, will transition to PO oxycodone and tylenol. C/w home Gabapentin for h/o peripheral neuropathy  Endo: No active disease    GI: Tolerating PO. No peritoneal signs. CT A/P () suspicious of partial obstruction/invasion of mass at level of rectosigmoid colon with possible active bleed. Last BM this AM semi-solid form. Colorectal surgery recommends palliative ostomy if patient and family desire surgical intervention    : Bilateral nephrostomy tubes functional. UA suggestive of UTI s/p single dose of Meropenum in ED. Urine Cx pending. Dr. Roberts Urology consulted, recommend changing nephrostomy tubes. CT A/P () concerning for mass invasion to bladder. Pad count to assess for vaginal bleeding ordered.   ID: Afebrile. Known h/o sinus tachycardia. Sepsis protocol followed in ED. Lactate 2.2 repeat this AM 0.8. Mild leukocytosis resolved s/p single dose of Meropenum in ED. Urine culture showing e. coli from left nephrostomy tube. Sensitivities pending. Will consider starting ceftriaxone. Blood culture pending.  Heme: Hgb 6.8 () now 10.6 s/p 3rd uPRBC for symptomatic anemia in setting of active vaginal and GI bleed. Goal Hgb >10. Oral DVT ppx on hold. SCDs while in bedrest. LE Dopplers without evidence of DVT bilaterally   Skin: no active disease   Psych: H/o depression. Continue with home medications.   FEN: No fluids. Saline Lock. Electrolytes wnl. Will continue AM labs.   Code Status: Full code  Dispo: recommend multidisciplinary meeting to discuss goals of care and surgical planning
68yo  LMP 2004 s/p RA TLH BSO (2017) and CRT (2018) with recurrent endometrial cancer on 2nd cycle of Carboplatin with known vesicovaginal fistula, bilateral nephrostomies and disease involving vaginal cuff, ureters, rectosigmoid colon and now bladder per CT A/P () admitted with UTI, symptomatic anemia for vaginal bleeding requiring blood transfusion and melena. HD#4.    Cancer type: Recurrent endometrial cancer. H/o Carbo/Taxol (), Vag cuff SBRT (2018), Last PET/CT (2019) suspicious for metastasis to iliac lymph nodes and persistent disease in vag cuff, s/p Pembrolizumab () and Doxorubicin Liposomal (10/2019-2020), now on 2nd cycle Carboplatin. Palliative Team consulted.      Cardiac: H/o Sinus tachycardia. Continues tachycardic. No other cardiac issues, BP well controlled. EKG showing sinus tachycardia    Pulmonary: no active disease. No oxygen requirements. CXR () negative.      Neuro: Pain well controlled with current regimen. Tylenol PO standing and Morphine IVP PRN, will transition to PO oxycodone and tylenol. C/w home Gabapentin for h/o peripheral neuropathy    Endo: No active disease      GI: Tolerating PO. No peritoneal signs. CT A/P () suspicious of partial obstruction/invasion of mass at level of rectosigmoid colon with possible active bleed. Last BM this AM semi-solid form. Colorectal surgery recommends palliative ostomy if patient and family desire surgical intervention, family considering options.      : Bilateral nephrostomy tubes functional. UA suggestive of UTI s/p single dose of Meropenum in ED. Urine Cx pending. Dr. Roberts Urology consulted, recommend changing nephrostomy tubes. CT A/P () concerning for mass invasion to bladder. Pad count to assess for vaginal bleeding ordered.     ID: Afebrile. Known h/o sinus tachycardia. Sepsis protocol followed in ED. Lactate 2.2 repeat yesterday 0.8. Mild leukocytosis resolved s/p single dose of Meropenum in ED. Urine culture showing e. coli from left nephrostomy tube. Sensitivities pending. Blood culture pending.    Heme: Hgb 6.8 () now 10.6 s/p 3rd uPRBC for symptomatic anemia in setting of active vaginal and GI bleed. Goal Hgb >10. Oral DVT ppx on hold. SCDs while in bedrest.   LE Dopplers without evidence of DVT bilaterally     Skin: no active disease     Psych: H/o depression. Continue with home medications.     FEN: No fluids. Saline Lock. Electrolytes wnl. Will continue AM labs.     Code Status: Full code    Dispo: Continue inpatient care. Family desires Hospice care, will arrange.
Assessment       Symptomatic Anemia  Improved  Vaginal and rectal Bleed  H/H stable today  10.6/32.5  Melena   Transfused yesterday 3 Units Blood    Recurrent Endometrial cancer  Large Pelvic Tumor  Bladder and ureters involved  Bilateral Nephrostomies-functional  **Dr. Roberts Urology consulted, recommend changing nephrostomy tubes..  Urine culture from Left Nephrostomy tube + ECOLI (chronic)   CT A/P (7/29) concerning for mass invasion to bladder       Vesicovaginal fistula-        Invasion of mass at level of rectosigmoid colon with possible active bleed.        Colorectal Surgery posing Palliative Colostomy    Abdominal Pain   Pain well controlled with current regimen.  Tylenol PO standing  Recommend increasing Gabapentin to 300mg Q8h  Morphine 2-4mg IV Q4 prn moderate to severe pain  Gabapentin 300 BID Peripheral Neuropathy  Oxycodone 5-10 mg PO upon discharge9 may have supply at home)  *Consider Steroids Decadron 4mg PO Q12 may reduce tumor pressure and pain     GOALS of Care  Code Status: Full code  Oncology Dr. Stanton called daughter yesterday afternoon and discussed options.   Chemotherapy is not curative, and will not improve her mother's quality of life- Becoming weaker-poor functional status    Multidisciplinary meeting to discuss goals of care and surgical planning Today at 1700 hrs  Palliative NP to be part of meeting-will call in be available by speaker phone
Patient is a 70yo F with endometrial CA since 2015 and recurrence in 2017 and 2018, with ureteral involvement requiring bilateral nephrostomy tube, on chemotherapy since 2018, presenting with vaginal bleeding and black BM on admission symptomatic anemia of Hg of 6.8 and CT findings of mass invading rectosigmoid colon.     -plan pending family and patient decision regarding palliative surgical intervention  -Will follow along  -rest of care per primary team
Patient is a 70yo F with endometrial CA since 2015 and recurrence in 2017 and 2018, with ureteral involvement requiring bilateral nephrostomy tube, on chemotherapy since 2018, presenting with vaginal bleeding and black BM on admission symptomatic anemia of Hg of 6.8 now 9.5 after 3U of PRBCs and CT findings of mass invading rectosigmoid colon.     -Due to the extended on the endometrial mass and ureteral involvement requiring nephrostomy tube, complete resection would be very unlike thus from a colorectal stand point a diverting colostomy as palliation can be offered if patient agreeable.  -Will reengage with patient and family regarding surgical option and GOC
68yo  LMP 2004 s/p RA TLH BSO (2017) and CRT (2018) with recurrent endometrial cancer on 2nd cycle of Carboplatin with known vesicovaginal fistula, bilateral nephrostomies and disease involving vaginal cuff, ureters, rectosigmoid colon and now bladder per CT A/P () admitted with UTI, symptomatic anemia for vaginal bleeding requiring blood transfusion and melena. HD#5.    Cancer type: Recurrent endometrial cancer. H/o Carbo/Taxol (), Vag cuff SBRT (2018), Last PET/CT (2019) suspicious for metastasis to iliac lymph nodes and persistent disease in vag cuff, s/p Pembrolizumab () and Doxorubicin Liposomal (10/2019-2020), now on 2nd cycle Carboplatin. Palliative Team consulted.      Cardiac: H/o Sinus tachycardia. Continues tachycardic. No other cardiac issues, BP well controlled. Prior EKG showing sinus tachycardia    Pulmonary: no active disease. No oxygen requirements. CXR () negative.      Neuro: Pain well controlled with current regimen. Tylenol PO standing and Morphine IVP PRN, will transition to PO oxycodone and tylenol. C/w home Gabapentin for h/o peripheral neuropathy    Endo: No active disease      GI: Tolerating PO. No peritoneal signs. CT A/P () suspicious of partial obstruction/invasion of mass at level of rectosigmoid colon with possible active bleed. Last BM this AM was semi-solid diarrhea. Colorectal surgery recommends palliative ostomy, family declined ostomy.       : Bilateral nephrostomy tubes functional. UA suggestive of UTI s/p single dose of Meropenum in ED and currently on macrobid. Dr. Roberts Urology consulted, recommend changing nephrostomy tubes. CT A/P () concerning for mass invasion to bladder. Pad count to assess for vaginal bleeding ordered.     ID: Afebrile. Known h/o sinus tachycardia. Sepsis protocol followed in ED. Lactate 2.2 repeat yesterday 0.8. Mild leukocytosis 11.6 today s/p single dose of Meropenum in ED. Urine culture showing e. coli from left nephrostomy tube that shows sensitivities to macrobid. Macrobid started yesterday.  Blood culture pending.    Heme: Hgb 6.8 () now 10.8 s/p 3rd uPRBC for symptomatic anemia in setting of active vaginal and GI bleed. Goal Hgb >10. Oral DVT ppx on hold. SCDs while in bedrest.   LE Dopplers without evidence of DVT bilaterally     Skin: no active disease     Psych: H/o depression. Continue with home medications.     FEN: No fluids. Saline Lock. Hypokalemia of 3.0 and hyponatremia. Will continue AM labs.     Code Status: Full code    Dispo: Continue inpatient care. Family desires Hospice care, will arrange.      discussed with Dr. Blevins

## 2020-08-02 NOTE — PROGRESS NOTE ADULT - SUBJECTIVE AND OBJECTIVE BOX
GYNECOLOGIC ONCOLOGY PROGRESS NOTE    HD #5    PROBLEMS:  UTI (urinary tract infection)  Rectal bleed  Nephrostomy status  Malignant neoplasm of endometrium  Anemia  Endometrial cancer  Suspected deep vein thrombosis (DVT)      Pt seen and examined at bedside.     SUBJECTIVE:    She reports feeling lower abdominal fullness.   Pain well-controlled. She reports having better controlled pain than yesterday   Flatus: yes and   Denies Nausea, Vomiting. + Loose stool this morning   Denies shortness of breath, chest pain or dyspnea on exertion.  Tolerating diet.    OBJECTIVE:     VITALS:  T(F): 97.9 (08-02-20 @ 04:39), Max: 99.4 (08-01-20 @ 23:46)  HR: 111 (08-02-20 @ 04:39) (108 - 113)  BP: 135/82 (08-02-20 @ 04:39) (108/67 - 135/82)  RR: 18 (08-02-20 @ 04:39) (18 - 20)  SpO2: 97% (08-02-20 @ 04:39) (95% - 97%)      I&O's Summary    31 Jul 2020 07:01  -  01 Aug 2020 07:00  --------------------------------------------------------  IN: 0 mL / OUT: 1475 mL / NET: -1475 mL    01 Aug 2020 07:01  -  02 Aug 2020 06:10  --------------------------------------------------------  IN: 0 mL / OUT: 1050 mL / NET: -1050 mL          Physical Exam:  Constitutional: NAD  Pulmonary: clear to auscultation bilaterally   Cardiovascular: Regular rate and rhythm   Abdomen: soft, diffusely tender with greatest tenderness over the  lower abdomen, non-distended, normal bowel sounds, bilateral nephrostomy tubes in place without signs of erythema and producing urine  Extremities: no lower extremity edema or calve tenderness, Agnes's sign negative.        LABS:                        10.8   11.64 )-----------( 280      ( 02 Aug 2020 05:02 )             33.1     08-02    133<L>  |  95<L>  |  9.0  ----------------------------<  121<H>  3.0<L>   |  22.0  |  0.80    Ca    8.3<L>      02 Aug 2020 05:02  Phos  3.6     08-02  Mg     1.9     08-02              acetaminophen   Tablet .. 650 milliGRAM(s) Oral every 6 hours PRN  bisacodyl 5 milliGRAM(s) Oral every 12 hours  cyanocobalamin 1000 MICROGram(s) Oral daily  dronabinol 2.5 milliGRAM(s) Oral two times a day  gabapentin Oral Tab/Cap - Peds 300 milliGRAM(s) Oral two times a day  nitrofurantoin monohydrate/macrocrystals (MACROBID) 100 milliGRAM(s) Oral two times a day with meals  oxyCODONE    IR 5 milliGRAM(s) Oral every 4 hours PRN  pyridoxine 100 milliGRAM(s) Oral daily

## 2020-08-02 NOTE — DISCHARGE NOTE PROVIDER - HOSPITAL COURSE
68 yo with history of recurrent endometrial cancer admitted with symptomatic anemia and diarrhea. She was found to have significant pelvic pressure. She was ruled out for DVT and was transfused a total of 3 units of prbc. She was offered a palliative colostomy by colorectal surgery for her diarrhea which she declined. She and her family decided to opt for home hospice. 70 yo with history of recurrent endometrial cancer admitted with symptomatic anemia and diarrhea. She was found to have significant pelvic pressure. She was ruled out for DVT and was transfused a total of 3 units of prbc. She was offered a palliative colostomy by colorectal surgery for her diarrhea which she declined. She and her family decided to opt for home hospice.  Family wanted to take her home so plan is for her to go home and have hospice follow up at home tomorrow, Monday.             Plan discussed with Dr. Blevins

## 2020-08-21 ENCOUNTER — TRANSCRIPTION ENCOUNTER (OUTPATIENT)
Age: 70
End: 2020-08-21

## 2020-12-23 PROBLEM — Z87.440 HISTORY OF URINARY TRACT INFECTION: Status: RESOLVED | Noted: 2020-01-01 | Resolved: 2020-12-23

## 2021-02-25 NOTE — PATIENT PROFILE ADULT. - PRESSURE ULCER(S)
Airway   Date/Time: 2/25/2021 7:58 AM   Patient location during procedure: OR  Staff -   Performed By: resident    Consent for Airway   Urgency: elective    Indications and Patient Condition  Indications for airway management: nadeem-procedural  Mask difficulty assessment: 1 - vent by mask    Final Airway Details  Final airway type: endotracheal airway  Successful airway:ETT - single  Endotracheal Airway Details   ETT size (mm): 7.0  Cuffed: yes  Successful intubation technique: direct laryngoscopy  Grade View of Cords: 1  Adjucts: stylet  Measured from: gums/teeth  Secured at (cm): 21  Secured with: pink tape  Bite block used: None    Post intubation assessment   Placement verified by: capnometry, equal breath sounds and chest rise   Number of attempts at approach: 1  Secured with:pink tape  Ease of procedure: easy         no

## 2022-11-16 NOTE — PROGRESS NOTE ADULT - REASON FOR ADMISSION
diarrhea, anemia
Detail Level: Zone

## 2023-03-29 NOTE — PATIENT PROFILE ADULT. - NS MD HP PRESSURE ULCER DRAIN
no
Information: This plan will allow you to select a body location and will not render the procedure on the note output. It will note the location you select in the morphology.
Detail Level: Simple

## 2023-04-10 NOTE — HISTORY OF PRESENT ILLNESS
yes
[FreeTextEntry1] : 67 year old female with vaginal cuff recurrence of endometrial adenocarcinoma.  Initial diagnosis in 2015, with initially underwent primary pelvic external beam radiation to 50.4 Gy completing 10/2015, with recurrence in 6/2017 for which she underwent RaTLH/BSO in 10/2017, and developed recurrence in the vaginal cuff in 1/2018.  Initial trial of Tamoxifen discontinued due to poor tolerance, carboplatin/taxol 5/2018-9/2018 with good radiographic response of near complete resolution.  However, developed recurrent vaginal bleeding within 2 months of completing chemotherapy.\par \par Most recently completed SBRT to the vaginal tumor mass, 24 Gy / 3 fx completing 12/14/18.  Reports interval resolution of vaginal discharge.  Denies vaginal itching or bleeding.  Did have a couple episodes of urinary stress incontinence, which is improving.  Denies blood per rectum or hemorrhoid irritation.  Occasional loose bowel movements, but no diarrhea.

## 2023-10-01 PROBLEM — Z92.3 HISTORY OF RADIATION THERAPY: Status: ACTIVE | Noted: 2017-09-07

## 2024-02-23 NOTE — ED ADULT TRIAGE NOTE - MEANS OF ARRIVAL
Marlyn Soliz RN             ambulatory

## 2024-12-31 NOTE — PATIENT PROFILE ADULT - BRADEN SENSORY
82 year old female with a pmh of HTN, CAD, HLD, CKD stage 3, Erosive osteoarthritis, asthma and anxiety who is known to our orthopedic surgery with recent admission and spinal surgery in Nov. 2024, now returns to [facility] with severe back pain and is pending OR on Monday for removal of hardware, exploration of fusion and extension of fusion to C7 (C7 to T3).    # back pain  - s/p WINIFRED, Exploration of fusion, extension of fusion to T1 w/ Tethers to C7 by Dr. TONO Salguero on 12-23  - pain regimen per primary team  - ensure bowel regimen  - can offer lidocaine patch   - can offer melatonin at night    #Fecal impaction   -NG placed on OR to LIWS  -Empty rectal vault on fecal disimpaction    -NGT removed 12/25 12/27: abd distended but having BMs and passing flatus. Xray abd ordered, informed no SBO on prelim read.   Diet advanced   -Call gen surg to evaluate if vomiting/worsening distention or abd pain develops.  -ptn having BMs  -If diarrhea, r/o GI infection with GI pcr before imodium.     #CAD  - no history of MI or stents  - EKG nonischemic  - continue statin     # LA enlargement  Seen on echo in last admission.  - TTE showed left atrial dilation but normal EF and no significant valvular disease  - appears euvolemic  - continue with outpatient follow up with cardiology  - monitor for orthostatic symptoms    #HTN  - on home Valsartan 160 BID: resumed daily with hold parameters, will resume to BID if continues to be hypertensive   - on diltiazem 120 mg at home - continue     #Asthma  - clear lungs, no acute exacerbation - albuterol inhaler PRN    #Erosive OA  - resume home dose of plaquenil     #CKD stage 3  - Cr stable  - avoid nephrotoxins  - monitor renal function and electrolytes    # Acute blood loss anemia  # chronic iron deficiency anemia  - s/p 1 prbc; Hb stable today ; will continue to monitor   - trend CBC  - maintain active T&S, two large bore IVs  - transfuse for Hgb <7     SCD for DVT ppx as per ortho    (3) slightly limited